# Patient Record
Sex: FEMALE | Race: BLACK OR AFRICAN AMERICAN | Employment: FULL TIME | URBAN - METROPOLITAN AREA
[De-identification: names, ages, dates, MRNs, and addresses within clinical notes are randomized per-mention and may not be internally consistent; named-entity substitution may affect disease eponyms.]

---

## 2017-04-07 ENCOUNTER — OFFICE VISIT (OUTPATIENT)
Dept: INTERNAL MEDICINE CLINIC | Age: 22
End: 2017-04-07

## 2017-04-07 VITALS
DIASTOLIC BLOOD PRESSURE: 82 MMHG | WEIGHT: 170 LBS | HEART RATE: 60 BPM | BODY MASS INDEX: 27.32 KG/M2 | TEMPERATURE: 97.4 F | HEIGHT: 66 IN | SYSTOLIC BLOOD PRESSURE: 116 MMHG | OXYGEN SATURATION: 96 % | RESPIRATION RATE: 14 BRPM

## 2017-04-07 DIAGNOSIS — J34.89 NASAL DRYNESS: Primary | ICD-10-CM

## 2017-04-07 NOTE — PROGRESS NOTES
Subjective:      Junaid Mckeon is a 20 y.o. female who presents today with irritation in her nose. Feels like there is something in her left nare. Right nare also uncomfortable. 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 30 Tab 3    OTHER For hair growth - has Biotin and Vit C + some other things      norethindrone ac-eth estradiol (LOESTRIN 1.5/30, 21,) 1.5-30 mg-mcg Tab Take as directed. 1 Package 6        Review of Systems    Pertinent items are noted in HPI. Objective:     Visit Vitals    /82 (BP 1 Location: Left arm, BP Patient Position: Sitting)    Pulse 60    Temp 97.4 °F (36.3 °C) (Oral)    Resp 14    Ht 5' 6.25\" (1.683 m)    Wt 170 lb (77.1 kg)    LMP 03/31/2017 (Exact Date)    SpO2 96%    BMI 27.23 kg/m2     General appearance: alert, cooperative, no distress, appears stated age  Head: Normocephalic, without obvious abnormality, atraumatic  Nose: Nares normal. Septum midline. Mucosa dry. No drainage or sinus tenderness. Assessment/Plan:     1. Nasal dryness  -recommended use of Ayr nasal spray with glycerine- use as needed throughout the day. Also recommended applying ayr nasal gel to also help maintain moisture      Follow-up Disposition:       Return if symptoms worsen or fail to improve. Advised patient to call back or return to office if symptoms worsen/change/persist.     Discussed expected course/resolution/complications of diagnosis in detail with patient. Medication risks/benefits/costs/interactions/alternatives discussed with patient. Patient was given an after visit summary which includes diagnoses, current medications, & vitals.    Patient expressed understanding with the diagnosis and plan.

## 2017-04-07 NOTE — MR AVS SNAPSHOT
Visit Information Date & Time Provider Department Dept. Phone Encounter #  
 4/7/2017 11:00 AM Sammy Andino MD [place] [address] [facility] [phone] [id number] Follow-up Instructions Return if symptoms worsen or fail to improve. Upcoming Health Maintenance Date Due  
 HPV AGE 9Y-34Y (1 of 3 - Female 3 Dose Series) 2/23/2005 Pneumococcal 19-64 Medium Risk (1 of 1 - PPSV23) 2/23/2013 DTaP/Tdap/Td series (1 - Tdap) 2/23/2015 INFLUENZA AGE 9 TO ADULT 8/1/2016 PAP AKA CERVICAL CYTOLOGY 11/15/2018 Allergies as of 4/7/2017  Review Complete On: 4/7/2017 By: Sammy Andino MD  
 No Known Allergies Current Immunizations  Never Reviewed No immunizations on file. Not reviewed this visit Vitals BP Pulse Temp Resp Height(growth percentile) Weight(growth percentile) 116/82 (BP 1 Location: Left arm, BP Patient Position: Sitting) 60 97.4 °F (36.3 °C) (Oral) 14 5' 6.25\" (1.683 m) 170 lb (77.1 kg) LMP SpO2 BMI OB Status Smoking Status 03/31/2017 (Exact Date) 96% 27.23 kg/m2 Having regular periods Never Smoker Vitals History BMI and BSA Data Body Mass Index Body Surface Area  
 27.23 kg/m 2 1.9 m 2 Preferred Pharmacy Pharmacy Name Phone [facility] [phone] Your Updated Medication List  
  
   
This list is accurate as of: 4/7/17 11:28 AM.  Always use your most recent med list.  
  
  
  
  
 LORazepam 0.5 mg tablet Commonly known as:  ATIVAN  
1/2 to 1 whole tab daily as needed. norethindrone ac-eth estradiol 1.5-30 mg-mcg Tab Commonly known as:  Navya Rouleau 1.5/30 (21) Take as directed. OTHER For hair growth - has Biotin and Vit C + some other things Follow-up Instructions Return if symptoms worsen or fail to improve. Introducing [facility]! Dear Aaron Europe: Thank you for requesting a Kaybus account. Our records indicate that you already have an active Kaybus account. You can access your account anytime at [email or web address] Did you know that you can access your hospital and ER discharge instructions at any time in Kaybus? You can also review all of your test results from your hospital stay or ER visit. Additional Information If you have questions, please visit the Frequently Asked Questions section of the Kaybus website at [email or web address] Remember, Kaybus is NOT to be used for urgent needs. For medical emergencies, dial 911. Now available from your iPhone and Android! Please provide this summary of care documentation to your next provider. Your primary care clinician is listed as Jasmin Ge. If you have any questions after today's visit, please call [phone].

## 2017-04-07 NOTE — PROGRESS NOTES
Chief Complaint   Patient presents with    Nasal Pain     Pt c/o \"polyp\" in right nasal passage and states that her nostril seem to be closing. She also notes a sore in her left nostril.

## 2017-12-06 RX ORDER — PROPRANOLOL HYDROCHLORIDE 20 MG/1
20 TABLET ORAL
Qty: 30 TAB | Refills: 3 | Status: SHIPPED | OUTPATIENT
Start: 2017-12-06 | End: 2018-04-20 | Stop reason: ALTCHOICE

## 2017-12-06 NOTE — TELEPHONE ENCOUNTER
Patient has trembling hands when nervous or anxious. 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     Plan: trial of use of propranolol 20mg daily on days in which she anticipates procedures that may cause her tremors./anxiety

## 2018-01-31 ENCOUNTER — OFFICE VISIT (OUTPATIENT)
Dept: INTERNAL MEDICINE CLINIC | Age: 23
End: 2018-01-31

## 2018-01-31 VITALS
WEIGHT: 176 LBS | TEMPERATURE: 98.1 F | SYSTOLIC BLOOD PRESSURE: 114 MMHG | OXYGEN SATURATION: 99 % | BODY MASS INDEX: 28.28 KG/M2 | HEIGHT: 66 IN | DIASTOLIC BLOOD PRESSURE: 70 MMHG | HEART RATE: 76 BPM

## 2018-01-31 DIAGNOSIS — H61.23 EXCESSIVE CERUMEN IN BOTH EAR CANALS: Primary | ICD-10-CM

## 2018-01-31 NOTE — MR AVS SNAPSHOT
[address], [address] [place] [address] 
[phone] Patient: Casey Spencer MRN: [id number] :1995 Visit Information Date & Time Provider Department Dept. Phone Encounter #  
 2018  9:20 AM VENANCIO Otto  [facility] [phone] [id number] Upcoming Health Maintenance Date Due  
 HPV AGE 9Y-34Y (1 of 3 - Female 3 Dose Series) 2005 Pneumococcal 19-64 Medium Risk (1 of 1 - PPSV23) 2013 DTaP/Tdap/Td series (1 - Tdap) 2015 Influenza Age 5 to Adult 2017 PAP AKA CERVICAL CYTOLOGY 11/15/2018 Allergies as of 2018  Review Complete On: 2018 By: Robbie Bush NP No Known Allergies Current Immunizations  Never Reviewed Name Date Influenza Vaccine (Quad) PF 2017 Not reviewed this visit You Were Diagnosed With   
  
 Codes Comments Excessive cerumen in both ear canals    -  Primary ICD-10-CM: J15.79 
ICD-9-CM: 380.4 Vitals BP Pulse Temp Height(growth percentile) Weight(growth percentile) LMP  
 114/70 (BP 1 Location: Left arm, BP Patient Position: Sitting) 76 98.1 °F (36.7 °C) (Oral) 5' 6.25\" (1.683 m) 176 lb (79.8 kg) 01/10/2018 SpO2 BMI OB Status Smoking Status 99% 28.19 kg/m2 Having regular periods Never Smoker Vitals History BMI and BSA Data Body Mass Index Body Surface Area  
 28.19 kg/m 2 1.93 m 2 Preferred Pharmacy Pharmacy Name Phone [facility] [phone] Your Updated Medication List  
  
   
This list is accurate as of: 18  9:45 AM.  Always use your most recent med list.  
  
  
  
  
 LORazepam 0.5 mg tablet Commonly known as:  ATIVAN  
1/2 to 1 whole tab daily as needed. norethindrone ac-eth estradiol 1.5-30 mg-mcg Tab Commonly known as:  Mario Bur 1.530 (21) Take as directed. propranolol 20 mg tablet Commonly known as:  INDERAL Take 1 Tab by mouth daily as needed. We Performed the Following REMOVE IMPACTED EAR WAX [55266 CPT(R)] Introducing [facility]! Dear Lui Mendoza: Thank you for requesting a VirtueBuild account. Our records indicate that you already have an active VirtueBuild account. You can access your account anytime at [email or web address] Did you know that you can access your hospital and ER discharge instructions at any time in VirtueBuild? You can also review all of your test results from your hospital stay or ER visit. Additional Information If you have questions, please visit the Frequently Asked Questions section of the VirtueBuild website at [email or web address] Remember, VirtueBuild is NOT to be used for urgent needs. For medical emergencies, dial 911. Now available from your iPhone and Android! Please provide this summary of care documentation to your next provider. Your primary care clinician is listed as Jasmin Ge. If you have any questions after today's visit, please call [phone].

## 2018-01-31 NOTE — PROGRESS NOTES
19 yo female here for ear cleaning. Was told by NP at school that she has excess wax in L ear. She self cleans with a kit from pharmacy about every 6 mos, but most recently not successfully. PE: WNWD BF   BP - 114/70   Ears - moderate cerumen in each ext canal    Imp: Excess Cerumen Bilat    Plan: Successful removal cerumen both ears   Asked her to bring in immunization records for update of chart   She plans to see Dr. Karyn Dawson for physical in upcoming year  ____________________________  Expected course of current diagnosed problem(s) as well as expected progression and possible complications, and desired follow up with provider are discussed with patient. Patient is encouraged to be back in touch with any questions or concerns. Patient expresses understanding of plan of care. Patient is given AVS which includes diagnoses, current medications, vitals.

## 2018-01-31 NOTE — PROGRESS NOTES
Reviewed record in preparation for visit and have obtained necessary documentation. Identified pt with two pt identifiers(name and ). Health Maintenance Due   Topic    HPV AGE 9Y-34Y (1 of 3 - Female 3 Dose Series)    Pneumococcal 19-64 Medium Risk (1 of 1 - PPSV23)    DTaP/Tdap/Td series (1 - Tdap)    Influenza Age 5 to Adult          Chief Complaint   Patient presents with    Wax in Ear     pt states that she needs an ear cleaning. NP at school told her that her L ear is occulded. Wt Readings from Last 3 Encounters:   18 176 lb (79.8 kg)   17 170 lb (77.1 kg)   16 185 lb (83.9 kg)     Temp Readings from Last 3 Encounters:   18 98.1 °F (36.7 °C) (Oral)   17 97.4 °F (36.3 °C) (Oral)   16 97.8 °F (36.6 °C) (Oral)     BP Readings from Last 3 Encounters:   18 114/70   17 116/82   16 124/60     Pulse Readings from Last 3 Encounters:   18 76   17 60   16 64           Learning Assessment:  :     Learning Assessment 2015   PRIMARY LEARNER Patient   HIGHEST LEVEL OF EDUCATION - PRIMARY LEARNER  2 YEARS OF COLLEGE   BARRIERS PRIMARY LEARNER NONE   CO-LEARNER CAREGIVER No   PRIMARY LANGUAGE ENGLISH   LEARNER PREFERENCE PRIMARY DEMONSTRATION   ANSWERED BY Patient   RELATIONSHIP SELF       Depression Screening:  :     PHQ over the last two weeks 2018   Little interest or pleasure in doing things Not at all   Feeling down, depressed or hopeless Not at all   Total Score PHQ 2 0       Fall Risk Assessment:  :     No flowsheet data found. Abuse Screening:  :     Abuse Screening Questionnaire 2018   Do you ever feel afraid of your partner? N   Are you in a relationship with someone who physically or mentally threatens you? N   Is it safe for you to go home?  Y       Coordination of Care Questionnaire:  :     1) Have you been to an emergency room, urgent care clinic since your last visit? no   Hospitalized since your last visit? no             2) Have you seen or consulted any other health care providers outside of [address] [place] [address] since your last visit? no  (Include any pap smears or colon screenings in this section.)    3) Do you have an Advance Directive on file? no    4) Are you interested in receiving information on Advance Directives?  NO

## 2018-04-20 ENCOUNTER — OFFICE VISIT (OUTPATIENT)
Dept: INTERNAL MEDICINE CLINIC | Age: 23
End: 2018-04-20

## 2018-04-20 VITALS
SYSTOLIC BLOOD PRESSURE: 116 MMHG | HEART RATE: 79 BPM | TEMPERATURE: 98.5 F | RESPIRATION RATE: 14 BRPM | OXYGEN SATURATION: 98 % | DIASTOLIC BLOOD PRESSURE: 68 MMHG | HEIGHT: 66 IN | BODY MASS INDEX: 27.8 KG/M2 | WEIGHT: 173 LBS

## 2018-04-20 DIAGNOSIS — Z00.00 ANNUAL PHYSICAL EXAM: Primary | ICD-10-CM

## 2018-04-20 DIAGNOSIS — Z12.4 PAP SMEAR FOR CERVICAL CANCER SCREENING: Chronic | ICD-10-CM

## 2018-04-20 RX ORDER — BISMUTH SUBSALICYLATE 262 MG
1 TABLET,CHEWABLE ORAL DAILY
COMMUNITY
End: 2019-04-17

## 2018-04-20 NOTE — MR AVS SNAPSHOT
[address] [place] [address], [address] [place] [address] 
[phone] Patient: Mike Ricketts MRN: [id number] :1995 Visit Information Date & Time Provider Department Dept. Phone Encounter #  
 2018 10:00 AM [place] [address] [facility] [phone] [id number] Follow-up Instructions Return in about 1 year (around 2019) for annual exam. Upcoming Health Maintenance Date Due  
 HPV Age 9Y-34Y (3 of 1 - Female 3 Dose Series) 2018* Pneumococcal 19-64 Medium Risk (1 of 1 - PPSV23) 2019* PAP AKA CERVICAL CYTOLOGY 11/15/2018 DTaP/Tdap/Td series (2 - Td) 2024 *Topic was postponed. The date shown is not the original due date. Allergies as of 2018  Review Complete On: 2018 By: Joni Combs MD  
 No Known Allergies Current Immunizations  Reviewed on 2018 Name Date DTP 2005, 1998, 1994, 1994, 1995 HPV (9-valent) 2008, 2008 Hep B Vaccine 1994, 1994, 1994 Hib 1995, 1994, 1994, 1994 Influenza Vaccine (Quad) PF 2017 MMR 1999, 1995 Meningococcal (MCV4) Vaccine 2012, 2009 Poliovirus vaccine 1998, 1995, 1994, 1994 Tdap 2014 Reviewed by Joni Combs MD on 2018 at  9:04 AM  
 Reviewed by Joni Combs MD on 2018 at  9:13 AM  
 Reviewed by Joni Combs MD on 2018 at 10:47 AM  
You Were Diagnosed With   
  
 Codes Comments Annual physical exam    -  Primary ICD-10-CM: Z00.00 ICD-9-CM: V70.0 Vitals BP Pulse Temp Resp Height(growth percentile) Weight(growth percentile) 116/68 (BP 1 Location: Left arm, BP Patient Position: Sitting) 79 98.5 °F (36.9 °C) (Oral) 14 5' 6.25\" (1.683 m) 173 lb (78.5 kg) LMP SpO2 BMI OB Status Smoking Status 2018 (Exact Date) 98% 27.71 kg/m2 Having regular periods Never Smoker Vitals History BMI and BSA Data Body Mass Index Body Surface Area  
 27.71 kg/m 2 1.92 m 2 Preferred Pharmacy Pharmacy Name Phone [facility] [phone] Your Updated Medication List  
  
   
This list is accurate as of 4/20/18 10:57 AM.  Always use your most recent med list.  
  
  
  
  
 LORazepam 0.5 mg tablet Commonly known as:  ATIVAN  
1/2 to 1 whole tab daily as needed. multivitamin tablet Commonly known as:  ONE A DAY Take 1 Tab by mouth daily. norethindrone ac-eth estradiol 1.5-30 mg-mcg Tab Commonly known as:  Selestine Salter 1.5/30 (21) Take as directed. We Performed the Following CBC WITH AUTOMATED DIFF [87084 CPT(R)] LIPID PANEL [70243 CPT(R)] METABOLIC PANEL, COMPREHENSIVE [40298 CPT(R)] URINALYSIS W/ RFLX MICROSCOPIC [66185 CPT(R)] Follow-up Instructions Return in about 1 year (around 4/20/2019) for annual exam.  
  
  
Introducing [address]! Dear Osmin Churchill: Thank you for requesting a Sunbay account. Our records indicate that you already have an active Sunbay account. You can access your account anytime at [email or web address] Did you know that you can access your hospital and ER discharge instructions at any time in Sunbay? You can also review all of your test results from your hospital stay or ER visit. Additional Information If you have questions, please visit the Frequently Asked Questions section of the Sunbay website at [email or web address] Remember, Sunbay is NOT to be used for urgent needs. For medical emergencies, dial 911. Now available from your iPhone and Android! Please provide this summary of care documentation to your next provider. Your primary care clinician is listed as Jasmin Ge. If you have any questions after today's visit, please call [phone].

## 2018-04-20 NOTE — PROGRESS NOTES
Patient's identity verified with two patient identifiers (name and date of birth). 1. Have you been to the ER, urgent care clinic since your last visit? Hospitalized since your last visit? No  2. Have you seen or consulted any other health care providers outside of the [facility] since your last visit? Include any pap smears or colon screening. No    Chief Complaint   Patient presents with    Complete Physical     Fasting. Fasting. Health Maintenance Due   Topic Date Due    HPV Age 9Y-34Y (1 of 3 - Female 3 Dose Series)  Has not had, unsure of status, will check with peds office. 11/28/2008     Reviewed record in preparation for visit and have obtained necessary documentation.     Wt Readings from Last 3 Encounters:   04/20/18 173 lb (78.5 kg)   01/31/18 176 lb (79.8 kg)   04/07/17 170 lb (77.1 kg)     Temp Readings from Last 3 Encounters:   04/20/18 98.5 °F (36.9 °C) (Oral)   01/31/18 98.1 °F (36.7 °C) (Oral)   04/07/17 97.4 °F (36.3 °C) (Oral)     BP Readings from Last 3 Encounters:   04/20/18 116/68   01/31/18 114/70   04/07/17 116/82     Pulse Readings from Last 3 Encounters:   04/20/18 79   01/31/18 76   04/07/17 60       Learning Assessment:  :     Learning Assessment 4/20/2018 1/6/2015   PRIMARY LEARNER Patient Patient   HIGHEST LEVEL OF EDUCATION - PRIMARY LEARNER  4 YEARS OF COLLEGE 2 YEARS OF COLLEGE   BARRIERS PRIMARY LEARNER NONE NONE   CO-LEARNER CAREGIVER No No   PRIMARY LANGUAGE ENGLISH ENGLISH   LEARNER PREFERENCE PRIMARY VIDEOS DEMONSTRATION     READING -   ANSWERED BY patient Patient   RELATIONSHIP SELF SELF

## 2018-04-20 NOTE — PROGRESS NOTES
Subjective:      Mima Brown is a 24 y.o. female who presents today for her annual exam.     Gyn exam - Devin Wolff NP, [facility] - 11/2017    No new concerns. 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 She denies any chest pain, shortness of breath, syncope, headaches, nausea/vomiting, or any bowel changes. 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  LORazepam (ATIVAN) 0.5 mg tablet 1/2 to 1 whole tab daily as needed. 30 Tab 3    norethindrone ac-eth estradiol (LOESTRIN 1.5/30, 21,) 1.5-30 mg-mcg Tab Take as directed. 1 Package 6        Review of Systems    A comprehensive review of systems was negative except for that written in the HPI. Objective:     Visit Vitals    /68 (BP 1 Location: Left arm, BP Patient Position: Sitting)    Pulse 79    Temp 98.5 °F (36.9 °C) (Oral)    Resp 14    Ht 5' 6.25\" (1.683 m)    Wt 173 lb (78.5 kg)    LMP 04/08/2018 (Exact Date)    SpO2 98%    BMI 27.71 kg/m2     General:  Alert, cooperative, no distress, appears stated age. Head:  Normocephalic, without obvious abnormality, atraumatic. Eyes:  Conjunctivae/corneas clear. PERRL, EOMs intact. Ears:  Normal TMs and external ear canals both ears. Nose: Nares normal. Septum midline. Mucosa normal. No drainage or sinus tenderness. Throat: Lips, mucosa, and tongue normal. Teeth and gums normal.   Neck: Supple, symmetrical, trachea midline, no adenopathy, thyroid: no enlargement/tenderness/nodules, no carotid bruit and no JVD. Back:   Symmetric, no curvature. ROM normal. No CVA tenderness. Lungs:   Clear to auscultation bilaterally.    Chest wall:  No tenderness or deformity. Heart:  Regular rate and rhythm, S1, S2 normal, no murmur, click, rub or gallop. Breast Exam:  No tenderness, masses, or nipple abnormality. Abdomen:   Soft, non-tender. Bowel sounds normal. No masses,  No organomegaly. Extremities: Extremities normal, atraumatic, no cyanosis or edema. Pulses: 2+ and symmetric all extremities. Skin: Skin color, texture, turgor normal. No rashes or lesions. Lymph nodes: Cervical, supraclavicular, and axillary nodes normal.   Neurologic: CNII-XII intact. Normal strength, sensation and reflexes throughout. Assessment/Plan:     1. Annual physical exam  -up to date with her immunizations.   -up to date with her gyn exam.     - CBC WITH AUTOMATED DIFF  - METABOLIC PANEL, COMPREHENSIVE  - LIPID PANEL  - URINALYSIS W/ RFLX MICROSCOPIC     Follow-up Disposition:     Follow up yearly     Return if symptoms worsen or fail to improve. Advised patient to call back or return to office if symptoms worsen/change/persist.     Discussed expected course/resolution/complications of diagnosis in detail with patient. Medication risks/benefits/costs/interactions/alternatives discussed with patient. Patient was given an after visit summary which includes diagnoses, current medications, & vitals. Patient expressed understanding with the diagnosis and plan.

## 2018-04-21 LAB
ALBUMIN SERPL-MCNC: 4.3 G/DL (ref 3.5–5.5)
ALBUMIN/GLOB SERPL: 1.7 {RATIO} (ref 1.2–2.2)
ALP SERPL-CCNC: 44 IU/L (ref 39–117)
ALT SERPL-CCNC: 32 IU/L (ref 0–32)
APPEARANCE UR: CLEAR
AST SERPL-CCNC: 26 IU/L (ref 0–40)
BACTERIA #/AREA URNS HPF: ABNORMAL /[HPF]
BASOPHILS # BLD AUTO: 0 X10E3/UL (ref 0–0.2)
BASOPHILS NFR BLD AUTO: 0 %
BILIRUB SERPL-MCNC: 0.3 MG/DL (ref 0–1.2)
BILIRUB UR QL STRIP: NEGATIVE
BUN SERPL-MCNC: 14 MG/DL (ref 6–20)
BUN/CREAT SERPL: 21 (ref 9–23)
CALCIUM SERPL-MCNC: 9.3 MG/DL (ref 8.7–10.2)
CASTS URNS QL MICRO: ABNORMAL /LPF
CHLORIDE SERPL-SCNC: 103 MMOL/L (ref 96–106)
CHOLEST SERPL-MCNC: 109 MG/DL (ref 100–199)
CO2 SERPL-SCNC: 23 MMOL/L (ref 18–29)
COLOR UR: YELLOW
CREAT SERPL-MCNC: 0.68 MG/DL (ref 0.57–1)
EOSINOPHIL # BLD AUTO: 0.1 X10E3/UL (ref 0–0.4)
EOSINOPHIL NFR BLD AUTO: 2 %
EPI CELLS #/AREA URNS HPF: ABNORMAL /HPF
ERYTHROCYTE [DISTWIDTH] IN BLOOD BY AUTOMATED COUNT: 13.6 % (ref 12.3–15.4)
GFR SERPLBLD CREATININE-BSD FMLA CKD-EPI: 123 ML/MIN/1.73
GFR SERPLBLD CREATININE-BSD FMLA CKD-EPI: 142 ML/MIN/1.73
GLOBULIN SER CALC-MCNC: 2.5 G/DL (ref 1.5–4.5)
GLUCOSE SERPL-MCNC: 89 MG/DL (ref 65–99)
GLUCOSE UR QL: NEGATIVE
HCT VFR BLD AUTO: 40.9 % (ref 34–46.6)
HDLC SERPL-MCNC: 49 MG/DL
HGB BLD-MCNC: 13.2 G/DL (ref 11.1–15.9)
HGB UR QL STRIP: ABNORMAL
IMM GRANULOCYTES # BLD: 0 X10E3/UL (ref 0–0.1)
IMM GRANULOCYTES NFR BLD: 0 %
INTERPRETATION, 910389: NORMAL
KETONES UR QL STRIP: NEGATIVE
LDLC SERPL CALC-MCNC: 40 MG/DL (ref 0–99)
LEUKOCYTE ESTERASE UR QL STRIP: NEGATIVE
LYMPHOCYTES # BLD AUTO: 2.7 X10E3/UL (ref 0.7–3.1)
LYMPHOCYTES NFR BLD AUTO: 45 %
MCH RBC QN AUTO: 27.2 PG (ref 26.6–33)
MCHC RBC AUTO-ENTMCNC: 32.3 G/DL (ref 31.5–35.7)
MCV RBC AUTO: 84 FL (ref 79–97)
MICRO URNS: ABNORMAL
MONOCYTES # BLD AUTO: 0.4 X10E3/UL (ref 0.1–0.9)
MONOCYTES NFR BLD AUTO: 8 %
MUCOUS THREADS URNS QL MICRO: PRESENT
NEUTROPHILS # BLD AUTO: 2.6 X10E3/UL (ref 1.4–7)
NEUTROPHILS NFR BLD AUTO: 45 %
NITRITE UR QL STRIP: NEGATIVE
PH UR STRIP: 5.5 [PH] (ref 5–7.5)
PLATELET # BLD AUTO: 308 X10E3/UL (ref 150–379)
POTASSIUM SERPL-SCNC: 3.8 MMOL/L (ref 3.5–5.2)
PROT SERPL-MCNC: 6.8 G/DL (ref 6–8.5)
PROT UR QL STRIP: ABNORMAL
RBC # BLD AUTO: 4.86 X10E6/UL (ref 3.77–5.28)
RBC #/AREA URNS HPF: ABNORMAL /HPF
SODIUM SERPL-SCNC: 140 MMOL/L (ref 134–144)
SP GR UR: >=1.03 (ref 1–1.03)
TRIGL SERPL-MCNC: 98 MG/DL (ref 0–149)
UROBILINOGEN UR STRIP-MCNC: 0.2 MG/DL (ref 0.2–1)
VLDLC SERPL CALC-MCNC: 20 MG/DL (ref 5–40)
WBC # BLD AUTO: 5.9 X10E3/UL (ref 3.4–10.8)
WBC #/AREA URNS HPF: ABNORMAL /HPF

## 2018-04-23 DIAGNOSIS — R31.29 MICROSCOPIC HEMATURIA: Primary | ICD-10-CM

## 2018-05-19 LAB
APPEARANCE UR: CLEAR
BACTERIA #/AREA URNS HPF: ABNORMAL /[HPF]
BILIRUB UR QL STRIP: NEGATIVE
CASTS URNS QL MICRO: ABNORMAL /LPF
COLOR UR: YELLOW
EPI CELLS #/AREA URNS HPF: >10 /HPF
GLUCOSE UR QL: NEGATIVE
HGB UR QL STRIP: ABNORMAL
KETONES UR QL STRIP: NEGATIVE
LEUKOCYTE ESTERASE UR QL STRIP: NEGATIVE
MICRO URNS: ABNORMAL
MUCOUS THREADS URNS QL MICRO: PRESENT
NITRITE UR QL STRIP: NEGATIVE
PH UR STRIP: 5.5 [PH] (ref 5–7.5)
PROT UR QL STRIP: NEGATIVE
RBC #/AREA URNS HPF: ABNORMAL /HPF
SP GR UR: 1.03 (ref 1–1.03)
UROBILINOGEN UR STRIP-MCNC: 0.2 MG/DL (ref 0.2–1)
WBC #/AREA URNS HPF: ABNORMAL /HPF

## 2018-08-20 ENCOUNTER — OFFICE VISIT (OUTPATIENT)
Dept: INTERNAL MEDICINE CLINIC | Age: 23
End: 2018-08-20

## 2018-08-20 VITALS
DIASTOLIC BLOOD PRESSURE: 84 MMHG | HEIGHT: 66 IN | HEART RATE: 66 BPM | TEMPERATURE: 96.6 F | OXYGEN SATURATION: 98 % | RESPIRATION RATE: 16 BRPM | SYSTOLIC BLOOD PRESSURE: 122 MMHG | WEIGHT: 182 LBS | BODY MASS INDEX: 29.25 KG/M2

## 2018-08-20 DIAGNOSIS — R21 RASH AND NONSPECIFIC SKIN ERUPTION: Primary | ICD-10-CM

## 2018-08-20 RX ORDER — TRIAMCINOLONE ACETONIDE 1 MG/G
OINTMENT TOPICAL
Qty: 30 G | Refills: 0 | Status: SHIPPED | OUTPATIENT
Start: 2018-08-20 | End: 2018-11-30 | Stop reason: ALTCHOICE

## 2018-08-20 NOTE — PROGRESS NOTES
Chief Complaint   Patient presents with    Rash     Pt c/o R shoulder rash that started about 2 weeks ago. States that she has tried Rx steroids with no improvements.

## 2018-08-20 NOTE — PROGRESS NOTES
23 yo female with rash on R shoulder which has not responded to OTC steroids and cocobutter for the skin discoloration; 3 days ago, she started using BID a topical prescription her mother gave her (Fluticasone propionate 0.05%), but no improvement. This started 2 weeks ago on top of both shoulders, w/ R always worse than L. It is pruritic, and nowhere else on the body. She has a dog, but he doesn't get up around her shoulders or face. She switched body wash over a month ago from brand-name unscented [employer] to Marvin brand of the same. She does not swim. She does not do gardening. She is an in-hospital med-surg NP. PE: WNWD BF   T - 96.6   Repeat BP - 122/84   L Shoulder - circumscribed hyperpigmented area 3 cm which is faded compared to R   R Shoulder - 2 patches which are more elongated - on proximal to neck is 5.5 cm and hyperpigmented, the other 8 cm and more inflammed in appearance    Imp: Skin rash - ? Fungal - Tinea corporis    Plan: Culture sent for fungus   Add Lamisil BID for 1 week to topical steroid   Triamcinolone topical BID  ___________________________  Expected course of current diagnosed problem(s) as well as expected progression and possible complications, and desired follow up with provider are discussed with patient. Patient is encouraged to be back in touch with any questions or concerns. Patient expresses understanding of plan of care. Patient is given AVS which includes diagnoses, current medications, vitals.

## 2018-08-20 NOTE — PATIENT INSTRUCTIONS
Lamisil topical to shoulder rash areas twice a day for ONE WEEK, then stop and use only the steroid as needed  ______________________________________________     Ringworm: Care Instructions  Your Care Instructions  Ringworm is a fungus infection of the skin. It is not caused by a worm. Ringworm causes a round, scaly rash that may crack and itch. The rash can spread over a wide area. One type of fungus that causes ringworm is often found in locker rooms and swimming pools. It grows well in warm, moist areas of the skin, such as in skin folds. You can get ringworm by sharing towels, clothing, and sports equipment. You can also get it by touching someone who has ringworm. Ringworm is treated with cream that kills the fungus. If the rash is widespread, you may need pills to get rid of it. Ringworm often comes back after treatment. If the rash becomes infected with bacteria, you may need antibiotics. Follow-up care is a key part of your treatment and safety. Be sure to make and go to all appointments, and call your doctor if you are having problems. It's also a good idea to know your test results and keep a list of the medicines you take. How can you care for yourself at home? · Take your medicines exactly as prescribed. Call your doctor if you have any problems with your medicine. · Wash the rash with soap and water, remove flaky skin, and dry thoroughly. · Try an over-the-counter cream with clotrimazole or miconazole in it. Brand names include Lotrimin, Micatin, and Tinactin. Terbinafine cream (Lamisil) is also available without a prescription. Spread the cream beyond the edge or border of the rash. Follow the directions on the package. Do not stop using the medicine just because your skin clears up. You will probably need to continue treatment for 2 to 4 weeks. · To keep from getting another infection:  ¨ Do not go barefoot in public places such as gyms or locker rooms. Avoid sharing towels and clothes.  Use flip-flops or some other type of shoe in the shower. ¨ Do not wear tight clothes or let your skin stay damp for long periods, such as by staying in a wet bathing suit or sweaty clothes. When should you call for help? Call your doctor now or seek immediate medical care if:    · You have signs of infection such as:  ¨ Pain, warmth, or swelling in your skin. ¨ Red streaks near a wound in the skin. ¨ Pus coming from the rash on your skin. ¨ A fever.    Watch closely for changes in your health, and be sure to contact your doctor if:    · Your ringworm does not improve after 2 weeks of treatment.     · You do not get better as expected. Where can you learn more? Go to http://kip-candelario.info/. Enter F293 in the search box to learn more about \"Ringworm: Care Instructions. \"  Current as of: October 5, 2017  Content Version: 11.7  © 0145-2888 [employer]. Care instructions adapted under license by [employer] (which disclaims liability or warranty for this information). If you have questions about a medical condition or this instruction, always ask your healthcare professional. [address] any warranty or liability for your use of this information.

## 2018-08-30 ENCOUNTER — DOCUMENTATION ONLY (OUTPATIENT)
Dept: INTERNAL MEDICINE CLINIC | Age: 23
End: 2018-08-30

## 2018-09-10 ENCOUNTER — PATIENT MESSAGE (OUTPATIENT)
Dept: INTERNAL MEDICINE CLINIC | Age: 23
End: 2018-09-10

## 2018-09-10 NOTE — TELEPHONE ENCOUNTER
From: Murali Cortes  To: Nando Packer NP  Sent: 9/10/2018 10:51 AM EDT  Subject: Test Results Question    Good morning,    Did the results for my shoulder culture ever come back?     [facility]

## 2018-09-10 NOTE — TELEPHONE ENCOUNTER
Checked [employer], result is preliminary. Call to [facility], states \"expected result date is 9/17/18\", no further information provided. Asked tech for name, but she hung up prior to responding. Patient informed of this information.

## 2018-09-20 LAB — FUNGUS SPEC CULT: NORMAL

## 2018-10-30 ENCOUNTER — OFFICE VISIT (OUTPATIENT)
Dept: INTERNAL MEDICINE CLINIC | Age: 23
End: 2018-10-30

## 2018-10-30 VITALS
RESPIRATION RATE: 16 BRPM | TEMPERATURE: 98.5 F | BODY MASS INDEX: 29.77 KG/M2 | HEIGHT: 66 IN | OXYGEN SATURATION: 99 % | HEART RATE: 79 BPM | SYSTOLIC BLOOD PRESSURE: 118 MMHG | DIASTOLIC BLOOD PRESSURE: 64 MMHG | WEIGHT: 185.2 LBS

## 2018-10-30 DIAGNOSIS — M70.62 TROCHANTERIC BURSITIS OF LEFT HIP: Primary | ICD-10-CM

## 2018-10-30 RX ORDER — METHYLPREDNISOLONE 4 MG/1
TABLET ORAL
Qty: 1 DOSE PACK | Refills: 0 | Status: SHIPPED | OUTPATIENT
Start: 2018-10-30 | End: 2018-11-30 | Stop reason: ALTCHOICE

## 2018-10-30 NOTE — PROGRESS NOTES
Theola Kehr is a 24 y.o. female     Chief Complaint   Patient presents with    Back Pain     Patient stated she has had the pain for the past several months the only thing that helps the pain is not putting weight on the left side. Visit Vitals  /64 (BP 1 Location: Left arm, BP Patient Position: Sitting)   Pulse 79   Temp 98.5 °F (36.9 °C) (Oral)   Resp 16   Ht 5' 6.25\" (1.683 m)   Wt 185 lb 3.2 oz (84 kg)   SpO2 99%   BMI 29.67 kg/m²       Health Maintenance Due   Topic Date Due    MEDICARE YEARLY EXAM  10/29/2018       1. Have you been to the ER, urgent care clinic since your last visit? Hospitalized since your last visit? No    2. Have you seen or consulted any other health care providers outside of the [address] [place] [address] since your last visit? Include any pap smears or colon screening.  No

## 2018-10-30 NOTE — PATIENT INSTRUCTIONS
Hip Bursitis: Care Instructions  Your Care Instructions    Bursitis is inflammation of the bursa. A bursa is a small sac of fluid that cushions a joint and helps it move easily. A bursa sits between a bone in the hip and the muscles and tendons in the thigh and buttock. Injury or overuse of the hip can cause bursitis. Activities that can lead to bursitis include twisting and rapid joint movement. Bursitis can cause hip pain. Bursitis usually gets better if you avoid the activity that caused it. If pain lasts or gets worse despite home treatment, your doctor may draw fluid from the bursa through a needle. This may relieve your pain and help your doctor know if you have an infection. If so, your doctor will prescribe antibiotics. If you have inflammation only, you may get a corticosteroid shot to reduce swelling and pain. Sometimes surgery is needed to drain or remove the bursa. Follow-up care is a key part of your treatment and safety. Be sure to make and go to all appointments, and call your doctor if you are having problems. It's also a good idea to know your test results and keep a list of the medicines you take. How can you care for yourself at home? · Put ice or a cold pack on your hip for 10 to 20 minutes at a time. Put a thin cloth between the ice and your skin. · After 3 days of using ice, you may use heat on your hip. You can use a hot water bottle, a heating pad set on low, or a warm, moist towel. · Rest your hip. Stop any activities that cause pain. Switch to activities that do not stress your hip. · Take your medicines exactly as prescribed. Call your doctor if you think you are having a problem with your medicine. · Ask your doctor if you can take an over-the-counter pain medicine, such as acetaminophen (Tylenol), ibuprofen (Advil, Motrin), or naproxen (Aleve). Be safe with medicines. Read and follow all instructions on the label.   · To prevent stiffness, gently move the hip joint as much as you can without pain every day. As the pain gets better, keep doing range-of-motion exercises. Ask your doctor for exercises that will make the muscles around the hip joint stronger. Do these as directed. · You can slowly return to the activity that caused the pain, but do it with less effort until you can do it without pain or swelling. Be sure to warm up before and stretch after you do the activity. When should you call for help? Call your doctor now or seek immediate medical care if:    · You have a fever.     · You have increased swelling or redness in your hip.     · You cannot use your hip, or the pain in your hip gets worse.    Watch closely for changes in your health, and be sure to contact your doctor if:    · You have pain for 2 weeks or longer despite home treatment. Where can you learn more? Go to http://kip-candelario.info/. Enter K172 in the search box to learn more about \"Hip Bursitis: Care Instructions. \"  Current as of: November 29, 2017  Content Version: 11.8  © 9707-2821 [employer]. Care instructions adapted under license by [employer] (which disclaims liability or warranty for this information). If you have questions about a medical condition or this instruction, always ask your healthcare professional. [address] any warranty or liability for your use of this information. _____________________________________     Trochanteric Bursitis: Exercises  Your Care Instructions  Here are some examples of typical rehabilitation exercises for your condition. Start each exercise slowly. Ease off the exercise if you start to have pain. Your doctor or physical therapist will tell you when you can start these exercises and which ones will work best for you. How to do the exercises  Hamstring wall stretch    1. Lie on your back in a doorway, with your good leg through the open door.   2. Slide your affected leg up the wall to straighten your knee. You should feel a gentle stretch down the back of your leg. 1. Do not arch your back. 2. Do not bend either knee. 3. Keep one heel touching the floor and the other heel touching the wall. Do not point your toes. 3. Hold the stretch for at least 1 minute to begin. Then try to lengthen the time you hold the stretch to as long as 6 minutes. 4. Repeat 2 to 4 times. 5. If you do not have a place to do this exercise in a doorway, there is another way to do it:  6. Lie on your back, and bend the knee of your affected leg. 7. Loop a towel under the ball and toes of that foot, and hold the ends of the towel in your hands. 8. Straighten your knee, and slowly pull back on the towel. You should feel a gentle stretch down the back of your leg. 9. Hold the stretch for 15 to 30 seconds. Or even better, hold the stretch for 1 minute if you can. 10. Repeat 2 to 4 times. Straight-leg raises to the outside    1. Lie on your side, with your affected leg on top. 2. Tighten the front thigh muscles of your top leg to keep your knee straight. 3. Keep your hip and your leg straight in line with the rest of your body, and keep your knee pointing forward. Do not drop your hip back. 4. Lift your top leg straight up toward the ceiling, about 12 inches off the floor. Hold for about 6 seconds, then slowly lower your leg. 5. Repeat 8 to 12 times. Clamshell    1. Lie on your side, with your affected leg on top and your head propped on a pillow. Keep your feet and knees together and your knees bent. 2. Raise your top knee, but keep your feet together. Do not let your hips roll back. Your legs should open up like a clamshell. 3. Hold for 6 seconds. 4. Slowly lower your knee back down. Rest for 10 seconds. 5. Repeat 8 to 12 times. Standing quadriceps stretch    1. If you are not steady on your feet, hold on to a chair, counter, or wall.  You can also lie on your stomach or your side to do this exercise. 2. Bend the knee of the leg you want to stretch, and reach behind you to grab the front of your foot or ankle with the hand on the same side. For example, if you are stretching your right leg, use your right hand. 3. Keeping your knees next to each other, pull your foot toward your buttock until you feel a gentle stretch across the front of your hip and down the front of your thigh. Your knee should be pointed directly to the ground, and not out to the side. 4. Hold the stretch for 15 to 30 seconds. 5. Repeat 2 to 4 times. Piriformis stretch    1. Lie on your back with your legs straight. 2. Lift your affected leg and bend your knee. With your opposite hand, reach across your body, and then gently pull your knee toward your opposite shoulder. 3. Hold the stretch for 15 to 30 seconds. 4. Repeat 2 to 4 times. Double knee-to-chest    1. Lie on your back with your knees bent and your feet flat on the floor. You can put a small pillow under your head and neck if it is more comfortable. 2. Bring both knees to your chest.  3. Keep your lower back pressed to the floor. Hold for 15 to 30 seconds. 4. Relax, and lower your knees to the starting position. 5. Repeat 2 to 4 times. Follow-up care is a key part of your treatment and safety. Be sure to make and go to all appointments, and call your doctor if you are having problems. It's also a good idea to know your test results and keep a list of the medicines you take. Where can you learn more? Go to http://kip-candelario.info/. Enter I872 in the search box to learn more about \"Trochanteric Bursitis: Exercises. \"  Current as of: November 29, 2017  Content Version: 11.8  © 8611-9154 [employer]. Care instructions adapted under license by [employer] (which disclaims liability or warranty for this information).  If you have questions about a medical condition or this instruction, always ask your healthcare professional. [address] any warranty or liability for your use of this information.

## 2018-10-30 NOTE — PROGRESS NOTES
23 yo female c/o back pain with worsening when weight-bearing on LLE. Several days ago, she took a 10 mile bike ride w/o problem (flat ground). She has taken 800 mg Ibuprofen BID and used heat w/o improvement. No hx injury to low back or LEs. 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 PE: WNWD BF   LLE - 2+ PT pulse             No distal edema             Tender over trochanteric bursa             SLR increases pain laterally    Imp: L Trochanteric Bursitis    Plan: Stop Advil   Medrol dospak   Ice   Exercise sheet given  ___________________________  Expected course of current diagnosed problem(s) as well as expected progression and possible complications, and desired follow up with provider are discussed with patient. Patient is encouraged to be back in touch with any questions or concerns. Patient expresses understanding of plan of care. Patient is given AVS which includes diagnoses, current medications, vitals.

## 2018-11-30 ENCOUNTER — OFFICE VISIT (OUTPATIENT)
Dept: FAMILY MEDICINE CLINIC | Age: 23
End: 2018-11-30

## 2018-11-30 VITALS
HEART RATE: 109 BPM | TEMPERATURE: 98.3 F | OXYGEN SATURATION: 97 % | HEIGHT: 66 IN | RESPIRATION RATE: 16 BRPM | DIASTOLIC BLOOD PRESSURE: 85 MMHG | BODY MASS INDEX: 29.96 KG/M2 | SYSTOLIC BLOOD PRESSURE: 128 MMHG | WEIGHT: 186.4 LBS

## 2018-11-30 DIAGNOSIS — H65.92 LEFT NON-SUPPURATIVE OTITIS MEDIA: ICD-10-CM

## 2018-11-30 DIAGNOSIS — J01.10 ACUTE NON-RECURRENT FRONTAL SINUSITIS: Primary | ICD-10-CM

## 2018-11-30 DIAGNOSIS — R06.2 WHEEZING: ICD-10-CM

## 2018-11-30 RX ORDER — ERGOCALCIFEROL 1.25 MG/1
CAPSULE ORAL
COMMUNITY
End: 2019-04-17 | Stop reason: ALTCHOICE

## 2018-11-30 RX ORDER — ALBUTEROL SULFATE 90 UG/1
2 AEROSOL, METERED RESPIRATORY (INHALATION)
Qty: 1 INHALER | Refills: 0 | Status: SHIPPED | OUTPATIENT
Start: 2018-11-30 | End: 2019-04-17

## 2018-11-30 RX ORDER — NORETHINDRONE ACETATE AND ETHINYL ESTRADIOL AND FERROUS FUMARATE 1MG-20(24)
KIT ORAL
COMMUNITY
End: 2022-03-14 | Stop reason: SDUPTHER

## 2018-11-30 RX ORDER — PSEUDOEPHEDRINE HCL 30 MG
60 TABLET ORAL 3 TIMES DAILY
Qty: 24 TAB | Refills: 1 | Status: SHIPPED | OUTPATIENT
Start: 2018-11-30 | End: 2018-12-03

## 2018-11-30 RX ORDER — AMOXICILLIN AND CLAVULANATE POTASSIUM 875; 125 MG/1; MG/1
1 TABLET, FILM COATED ORAL EVERY 12 HOURS
Qty: 20 TAB | Refills: 0 | Status: SHIPPED | OUTPATIENT
Start: 2018-11-30 | End: 2018-12-10

## 2018-11-30 NOTE — PROGRESS NOTES
Subjective:   Lerry Litten is a 24 y.o. female who complains of congestion, sore throat, nasal blockage, post nasal drip, dry cough, headache, generalized sinus pain, fever, chills for 5 days, gradually worsening since that time. She denies a history of chest pain and dizziness. She started having wheezing and mild SOB today, she has had a history of asthma 5 or more years ago in [employer] years. Evaluation to date: none. Treatment to date: OTC products. Patient does not smoke cigarettes. Relevant PMH: Asthma. 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    Pap smear for cervical cancer screening Z12.4     Patient Active Problem List    Diagnosis Date Noted    Pap smear for cervical cancer screening 04/20/2018    Limb tremor 11/13/2014    Herpes simplex of female genitalia 05/31/2013    Asthma, exercise induced 10/15/2011     Current Outpatient Medications   Medication Sig Dispense Refill    norethindrone-e.estradiol-iron (MINASTRIN 24 FE) 1 mg-20 mcg(24) /75 mg (4) chew Minastrin 24 Fe 1 mg-20 mcg (24)/75 mg (4) chewable tablet   Chew 1 pill at the same time daily  GRP# [id number]   BIN# [id number] ID# [id number]      PCN#  CN      ergocalciferol (ERGOCALCIFEROL) 50,000 unit capsule Vitamin D2 50,000 unit capsule   Take 1 capsule every week by oral route.  pseudoephedrine (SUDAFED) 30 mg tablet Take 2 Tabs by mouth three (3) times daily for 3 days. 24 Tab 1    albuterol (PROVENTIL HFA, VENTOLIN HFA, PROAIR HFA) 90 mcg/actuation inhaler Take 2 Puffs by inhalation every four (4) hours as needed for Wheezing. 1 Inhaler 0    amoxicillin-clavulanate (AUGMENTIN) 875-125 mg per tablet Take 1 Tab by mouth every twelve (12) hours for 10 days. 20 Tab 0    levonorgestrel (MIRENA) 20 mcg/24 hr (5 years) IUD 1 Device by IntraUTERine route once.  Indications: June 2018      multivitamin (ONE A DAY) tablet Take 1 Tab by mouth daily. No Known Allergies  Past Medical History:   Diagnosis Date    Anemia NEC     Herpes 3/2013    Migraine     Migraine     Other ill-defined conditions(799.89)     Left breast mass    Reactive airway disease     exercise induced     History reviewed. No pertinent surgical history. Family History   Problem Relation Age of Onset    Hypertension Mother     Other Mother         GERD    Hypertension Father     Arthritis-osteo Father     Other Sister         idiopathic allergic reaction     Social History     Tobacco Use    Smoking status: Never Smoker    Smokeless tobacco: Never Used   Substance Use Topics    Alcohol use: No        Review of Systems  Pertinent items are noted in HPI. Objective:     Visit Vitals  /85 (BP 1 Location: Right arm, BP Patient Position: Sitting)   Pulse (!) 109   Temp 98.3 °F (36.8 °C) (Oral)   Resp 16   Ht 5' 6.25\" (1.683 m)   Wt 186 lb 6.4 oz (84.6 kg)   SpO2 97%   BMI 29.86 kg/m²     General:  alert, cooperative, no distress   Eyes: negative   Ears: abnormal TM AS - erythematous, bulging, right TM grey   Sinuses: tenderness over bilateral frontal   Mouth:  Lips, mucosa, and tongue normal. Teeth and gums normal and abnormal findings: moderate oropharyngeal erythema   Neck: supple, symmetrical, trachea midline and no adenopathy. Heart: S1 and S2 normal, no murmurs noted. Lungs: clear to auscultation bilaterally   Abdomen: soft, non-tender. Bowel sounds normal. No masses,  no organomegaly        Assessment/Plan:   otitis media and sinusitis  Discussed the dx and tx of sinusitis. Suggested symptomatic OTC remedies. Suggested brief course of Afrin or similar. Nasal saline sprays for congestion. Antibiotics per orders. RTC prn. ICD-10-CM ICD-9-CM    1. Acute non-recurrent frontal sinusitis J01.10 461.1    2. Wheezing R06.2 786.07 albuterol (PROVENTIL HFA, VENTOLIN HFA, PROAIR HFA) 90 mcg/actuation inhaler   3.  Left non-suppurative otitis media H65.92 381. 4 pseudoephedrine (SUDAFED) 30 mg tablet      amoxicillin-clavulanate (AUGMENTIN) 875-125 mg per tablet   .

## 2018-11-30 NOTE — PATIENT INSTRUCTIONS
Sinusitis: Care Instructions  Your Care Instructions    Sinusitis is an infection of the lining of the sinus cavities in your head. Sinusitis often follows a cold. It causes pain and pressure in your head and face. In most cases, sinusitis gets better on its own in 1 to 2 weeks. But some mild symptoms may last for several weeks. Sometimes antibiotics are needed. Follow-up care is a key part of your treatment and safety. Be sure to make and go to all appointments, and call your doctor if you are having problems. It's also a good idea to know your test results and keep a list of the medicines you take. How can you care for yourself at home? · Take an over-the-counter pain medicine, such as acetaminophen (Tylenol), ibuprofen (Advil, Motrin), or naproxen (Aleve). Read and follow all instructions on the label. · If the doctor prescribed antibiotics, take them as directed. Do not stop taking them just because you feel better. You need to take the full course of antibiotics. · Be careful when taking over-the-counter cold or flu medicines and Tylenol at the same time. Many of these medicines have acetaminophen, which is Tylenol. Read the labels to make sure that you are not taking more than the recommended dose. Too much acetaminophen (Tylenol) can be harmful. · Breathe warm, moist air from a steamy shower, a hot bath, or a sink filled with hot water. Avoid cold, dry air. Using a humidifier in your home may help. Follow the directions for cleaning the machine. · Use saline (saltwater) nasal washes to help keep your nasal passages open and wash out mucus and bacteria. You can buy saline nose drops at a grocery store or drugstore. Or you can make your own at home by adding 1 teaspoon of salt and 1 teaspoon of baking soda to 2 cups of distilled water. If you make your own, fill a bulb syringe with the solution, insert the tip into your nostril, and squeeze gently. Jacquiline Husam your nose.   · Put a hot, wet towel or a warm gel pack on your face 3 or 4 times a day for 5 to 10 minutes each time. · Try a decongestant nasal spray like oxymetazoline (Afrin). Do not use it for more than 3 days in a row. Using it for more than 3 days can make your congestion worse. When should you call for help? Call your doctor now or seek immediate medical care if:    · You have new or worse swelling or redness in your face or around your eyes.     · You have a new or higher fever.    Watch closely for changes in your health, and be sure to contact your doctor if:    · You have new or worse facial pain.     · The mucus from your nose becomes thicker (like pus) or has new blood in it.     · You are not getting better as expected. Where can you learn more? Go to http://kip-candelario.info/. Enter L948 in the search box to learn more about \"Sinusitis: Care Instructions. \"  Current as of: March 28, 2018  Content Version: 11.8  © 6214-6691 [employer]. Care instructions adapted under license by [employer] (which disclaims liability or warranty for this information). If you have questions about a medical condition or this instruction, always ask your healthcare professional. [place] [address] any warranty or liability for your use of this information.

## 2018-11-30 NOTE — PROGRESS NOTES
Pt complains of sore throat, nasal congestion, cough. Pt started getting shrt of breath after starting alkaseltser cold and flu.

## 2019-04-17 ENCOUNTER — OFFICE VISIT (OUTPATIENT)
Dept: INTERNAL MEDICINE CLINIC | Age: 24
End: 2019-04-17

## 2019-04-17 VITALS
SYSTOLIC BLOOD PRESSURE: 90 MMHG | TEMPERATURE: 98.1 F | BODY MASS INDEX: 29.09 KG/M2 | RESPIRATION RATE: 14 BRPM | DIASTOLIC BLOOD PRESSURE: 70 MMHG | WEIGHT: 181 LBS | OXYGEN SATURATION: 98 % | HEIGHT: 66 IN | HEART RATE: 82 BPM

## 2019-04-17 DIAGNOSIS — R25.1 TREMOR, UNSPECIFIED: ICD-10-CM

## 2019-04-17 DIAGNOSIS — F41.8 SITUATIONAL ANXIETY: Primary | ICD-10-CM

## 2019-04-17 RX ORDER — GLUCOSAMINE SULFATE 1500 MG
POWDER IN PACKET (EA) ORAL DAILY
COMMUNITY
End: 2019-08-27

## 2019-04-17 RX ORDER — LORAZEPAM 0.5 MG/1
TABLET ORAL
Qty: 10 TAB | Refills: 0 | Status: SHIPPED | OUTPATIENT
Start: 2019-04-17 | End: 2020-05-22

## 2019-04-17 RX ORDER — PHENTERMINE HYDROCHLORIDE 30 MG/1
1 CAPSULE ORAL DAILY
Refills: 0 | COMMUNITY
Start: 2019-04-11 | End: 2019-08-27

## 2019-04-17 RX ORDER — PHENDIMETRAZINE TARTRATE 35 MG/1
35 TABLET ORAL DAILY
Refills: 0 | COMMUNITY
Start: 2019-03-22 | End: 2019-04-17 | Stop reason: ALTCHOICE

## 2019-04-17 NOTE — PROGRESS NOTES
20 yo female in for medication refill. 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 She has a job offer, too. She is experiencing anxiety, and would like some medication to use prn as she did several years ago. Sleep is better. Anxiety is presented as mild tremor, sweating palms and axillae and IBS; no palpitations. She is on a weight loss medication through [facility]. PE WNWD BF is alert and calm, and makes eye contact throughout our encounter   BP - 90/70 (just came from gym; no sxs)   Heart - RRR   Lungs - clear    Imp: Situational Anxiety     Plan: Lorazepam 0.5 mg 1/2 to one tab daily prn ( consulted)   She will see Dr. Handy Gonzalez later this year for physical  ___________________________  Expected course of current diagnosed problem(s) as well as expected progression and possible complications, and desired follow up with provider are discussed with patient. Patient is encouraged to be back in touch with any questions or concerns. Patient expresses understanding of plan of care. Patient is given AVS which includes diagnoses, current medications, vitals.

## 2019-04-17 NOTE — PROGRESS NOTES
Chief Complaint   Patient presents with    Medication Refill     Reviewed record in preparation for visit and have obtained necessary documentation. Identified pt with two pt identifiers(name and ). Health Maintenance Due   Topic    Pneumococcal 0-64 years (1 of 1 - PPSV23)    DTaP/Tdap/Td series (2 - Td)         Chief Complaint   Patient presents with    Medication Refill        Wt Readings from Last 3 Encounters:   19 181 lb (82.1 kg)   18 186 lb 6.4 oz (84.6 kg)   10/30/18 185 lb 3.2 oz (84 kg)     Temp Readings from Last 3 Encounters:   19 98.1 °F (36.7 °C) (Oral)   18 98.3 °F (36.8 °C) (Oral)   10/30/18 98.5 °F (36.9 °C) (Oral)     BP Readings from Last 3 Encounters:   19 90/70   18 128/85   10/30/18 118/64     Pulse Readings from Last 3 Encounters:   19 82   18 (!) 109   10/30/18 79           Learning Assessment:  :     Learning Assessment 2018   PRIMARY LEARNER Patient Patient   HIGHEST LEVEL OF EDUCATION - PRIMARY LEARNER  4 YEARS OF COLLEGE 2 YEARS OF COLLEGE   BARRIERS PRIMARY LEARNER NONE NONE   CO-LEARNER CAREGIVER No No   PRIMARY LANGUAGE ENGLISH ENGLISH   LEARNER PREFERENCE PRIMARY VIDEOS DEMONSTRATION     READING -   ANSWERED BY patient Patient   RELATIONSHIP SELF SELF       Depression Screening:  :     3 most recent PHQ Screens 2019   Little interest or pleasure in doing things Not at all   Feeling down, depressed, irritable, or hopeless Not at all   Total Score PHQ 2 0       Fall Risk Assessment:  :     No flowsheet data found. Abuse Screening:  :     Abuse Screening Questionnaire 2018   Do you ever feel afraid of your partner? N   Are you in a relationship with someone who physically or mentally threatens you? N   Is it safe for you to go home?  Y       Coordination of Care Questionnaire:  :     1) Have you been to an emergency room, urgent care clinic since your last visit? no   Hospitalized since your last visit? no             2) Have you seen or consulted any other health care providers outside of [address] [place] [address] since your last visit? no  (Include any pap smears or colon screenings in this section.)    3) Do you have an Advance Directive on file? no    4) Are you interested in receiving information on Advance Directives? NO      Patient is accompanied by self I have received verbal consent from [facility] to discuss any/all medical information while they are present in the room. Reviewed record  In preparation for visit and have obtained necessary documentation.

## 2019-04-18 LAB
ALBUMIN SERPL-MCNC: 4.4 G/DL (ref 3.5–5.5)
ALBUMIN/GLOB SERPL: 1.8 {RATIO} (ref 1.2–2.2)
ALP SERPL-CCNC: 43 IU/L (ref 39–117)
ALT SERPL-CCNC: 25 IU/L (ref 0–32)
AST SERPL-CCNC: 20 IU/L (ref 0–40)
BASOPHILS # BLD AUTO: 0 X10E3/UL (ref 0–0.2)
BASOPHILS NFR BLD AUTO: 1 %
BILIRUB SERPL-MCNC: 0.3 MG/DL (ref 0–1.2)
BUN SERPL-MCNC: 14 MG/DL (ref 6–20)
BUN/CREAT SERPL: 19 (ref 9–23)
CALCIUM SERPL-MCNC: 8.9 MG/DL (ref 8.7–10.2)
CHLORIDE SERPL-SCNC: 103 MMOL/L (ref 96–106)
CO2 SERPL-SCNC: 23 MMOL/L (ref 20–29)
CREAT SERPL-MCNC: 0.72 MG/DL (ref 0.57–1)
EOSINOPHIL # BLD AUTO: 0.3 X10E3/UL (ref 0–0.4)
EOSINOPHIL NFR BLD AUTO: 7 %
ERYTHROCYTE [DISTWIDTH] IN BLOOD BY AUTOMATED COUNT: 13.8 % (ref 12.3–15.4)
GLOBULIN SER CALC-MCNC: 2.5 G/DL (ref 1.5–4.5)
GLUCOSE SERPL-MCNC: 77 MG/DL (ref 65–99)
HCT VFR BLD AUTO: 42.8 % (ref 34–46.6)
HGB BLD-MCNC: 13.7 G/DL (ref 11.1–15.9)
IMM GRANULOCYTES # BLD AUTO: 0 X10E3/UL (ref 0–0.1)
IMM GRANULOCYTES NFR BLD AUTO: 0 %
LYMPHOCYTES # BLD AUTO: 1.4 X10E3/UL (ref 0.7–3.1)
LYMPHOCYTES NFR BLD AUTO: 36 %
MCH RBC QN AUTO: 27.8 PG (ref 26.6–33)
MCHC RBC AUTO-ENTMCNC: 32 G/DL (ref 31.5–35.7)
MCV RBC AUTO: 87 FL (ref 79–97)
MONOCYTES # BLD AUTO: 0.5 X10E3/UL (ref 0.1–0.9)
MONOCYTES NFR BLD AUTO: 12 %
NEUTROPHILS # BLD AUTO: 1.8 X10E3/UL (ref 1.4–7)
NEUTROPHILS NFR BLD AUTO: 44 %
PLATELET # BLD AUTO: 298 X10E3/UL (ref 150–379)
POTASSIUM SERPL-SCNC: 4.6 MMOL/L (ref 3.5–5.2)
PROT SERPL-MCNC: 6.9 G/DL (ref 6–8.5)
RBC # BLD AUTO: 4.93 X10E6/UL (ref 3.77–5.28)
SODIUM SERPL-SCNC: 141 MMOL/L (ref 134–144)
WBC # BLD AUTO: 4.1 X10E3/UL (ref 3.4–10.8)

## 2019-08-26 NOTE — PROGRESS NOTES
Subjective:      Gabriele Noe is a 21 y.o. female who presents today for her annual exam.     Gyn care is with [facility] Dr. Wilhelm Ranks- 4/2019    She is concerned about weight gain. She was taking phentermine and working with the [facility], but time and expense caused her to stop that program.  She has gained the weight back, especially eating more carbs than what the [facility] clinic had her doing. She is exercising some. Due to her schedule, she is usually picking up \"quick\" foods, that could be better. 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 Objective: Wt Readings from Last 3 Encounters:   08/27/19 199 lb (90.3 kg)   04/17/19 181 lb (82.1 kg)   11/30/18 186 lb 6.4 oz (84.6 kg)       Visit Vitals  /60 (BP 1 Location: Left arm, BP Patient Position: Sitting)   Pulse 76   Temp 98.4 °F (36.9 °C) (Oral)   Resp 16   Ht 5' 6\" (1.676 m)   Wt 199 lb (90.3 kg)   LMP 08/08/2019   SpO2 99%   BMI 32.12 kg/m²     General:  Alert, cooperative, no distress, appears stated age. Head:  Normocephalic, without obvious abnormality, atraumatic. Eyes:  Conjunctivae/corneas clear. PERRL, EOMs intact. Ears:  Normal TMs and external ear canals both ears.    Nose: Nares normal. Septum midline. Mucosa normal. No drainage or sinus tenderness. Throat: Lips, mucosa, and tongue normal. Teeth and gums normal.   Neck: Supple, symmetrical, trachea midline, no adenopathy, thyroid: no enlargement/tenderness/nodules, no carotid bruit and no JVD. Back:   Symmetric, no curvature. ROM normal. No CVA tenderness. Lungs:   Clear to auscultation bilaterally. Chest wall:  No tenderness or deformity. Heart:  Regular rate and rhythm, S1, S2 normal, no murmur, click, rub or gallop. Breast Exam:  No tenderness, masses, or nipple abnormality. Abdomen:   Soft, non-tender. Bowel sounds normal. No masses,  No organomegaly. Extremities: Extremities normal, atraumatic, no cyanosis or edema. Pulses: 2+ and symmetric all extremities. Skin: Skin color, texture, turgor normal. No rashes or lesions. Lymph nodes: Cervical, supraclavicular, and axillary nodes normal.       Assessment/Plan:     1. Annual physical exam  -recommended she work on establishing a good exercise routine for now. Once the exercise becomes a habit, then work on diet routine.   -up to date with gyn care. - METABOLIC PANEL, COMPREHENSIVE  - CBC WITH AUTOMATED DIFF  - LIPID PANEL  - TSH 3RD GENERATION  - T4, FREE     Follow-up Disposition:     Follow up yearly     Return if symptoms worsen or fail to improve. Advised patient to call back or return to office if symptoms worsen/change/persist.     Discussed expected course/resolution/complications of diagnosis in detail with patient. Medication risks/benefits/costs/interactions/alternatives discussed with patient. Patient was given an after visit summary which includes diagnoses, current medications, & vitals. Patient expressed understanding with the diagnosis and plan.

## 2019-08-27 ENCOUNTER — OFFICE VISIT (OUTPATIENT)
Dept: INTERNAL MEDICINE CLINIC | Age: 24
End: 2019-08-27

## 2019-08-27 VITALS
SYSTOLIC BLOOD PRESSURE: 130 MMHG | BODY MASS INDEX: 31.98 KG/M2 | HEART RATE: 76 BPM | DIASTOLIC BLOOD PRESSURE: 60 MMHG | HEIGHT: 66 IN | OXYGEN SATURATION: 99 % | WEIGHT: 199 LBS | RESPIRATION RATE: 16 BRPM | TEMPERATURE: 98.4 F

## 2019-08-27 DIAGNOSIS — Z00.00 ANNUAL PHYSICAL EXAM: Primary | ICD-10-CM

## 2019-08-29 LAB
ALBUMIN SERPL-MCNC: 4.1 G/DL (ref 3.5–5.5)
ALBUMIN/GLOB SERPL: 1.6 {RATIO} (ref 1.2–2.2)
ALP SERPL-CCNC: 44 IU/L (ref 39–117)
ALT SERPL-CCNC: 20 IU/L (ref 0–32)
AST SERPL-CCNC: 17 IU/L (ref 0–40)
BASOPHILS # BLD AUTO: 0 X10E3/UL (ref 0–0.2)
BASOPHILS NFR BLD AUTO: 1 %
BILIRUB SERPL-MCNC: <0.2 MG/DL (ref 0–1.2)
BUN SERPL-MCNC: 12 MG/DL (ref 6–20)
BUN/CREAT SERPL: 18 (ref 9–23)
CALCIUM SERPL-MCNC: 8.7 MG/DL (ref 8.7–10.2)
CHLORIDE SERPL-SCNC: 105 MMOL/L (ref 96–106)
CHOLEST SERPL-MCNC: 116 MG/DL (ref 100–199)
CO2 SERPL-SCNC: 22 MMOL/L (ref 20–29)
CREAT SERPL-MCNC: 0.67 MG/DL (ref 0.57–1)
EOSINOPHIL # BLD AUTO: 0.3 X10E3/UL (ref 0–0.4)
EOSINOPHIL NFR BLD AUTO: 5 %
ERYTHROCYTE [DISTWIDTH] IN BLOOD BY AUTOMATED COUNT: 12.1 % (ref 12.3–15.4)
GLOBULIN SER CALC-MCNC: 2.6 G/DL (ref 1.5–4.5)
GLUCOSE SERPL-MCNC: 95 MG/DL (ref 65–99)
HCT VFR BLD AUTO: 40.7 % (ref 34–46.6)
HDLC SERPL-MCNC: 58 MG/DL
HGB BLD-MCNC: 12.9 G/DL (ref 11.1–15.9)
IMM GRANULOCYTES # BLD AUTO: 0 X10E3/UL (ref 0–0.1)
IMM GRANULOCYTES NFR BLD AUTO: 0 %
INTERPRETATION, 910389: NORMAL
LDLC SERPL CALC-MCNC: 47 MG/DL (ref 0–99)
LYMPHOCYTES # BLD AUTO: 2.2 X10E3/UL (ref 0.7–3.1)
LYMPHOCYTES NFR BLD AUTO: 41 %
MCH RBC QN AUTO: 26.5 PG (ref 26.6–33)
MCHC RBC AUTO-ENTMCNC: 31.7 G/DL (ref 31.5–35.7)
MCV RBC AUTO: 84 FL (ref 79–97)
MONOCYTES # BLD AUTO: 0.6 X10E3/UL (ref 0.1–0.9)
MONOCYTES NFR BLD AUTO: 12 %
NEUTROPHILS # BLD AUTO: 2.2 X10E3/UL (ref 1.4–7)
NEUTROPHILS NFR BLD AUTO: 41 %
PLATELET # BLD AUTO: 308 X10E3/UL (ref 150–450)
POTASSIUM SERPL-SCNC: 4.2 MMOL/L (ref 3.5–5.2)
PROT SERPL-MCNC: 6.7 G/DL (ref 6–8.5)
RBC # BLD AUTO: 4.87 X10E6/UL (ref 3.77–5.28)
SODIUM SERPL-SCNC: 140 MMOL/L (ref 134–144)
T4 FREE SERPL-MCNC: 1.13 NG/DL (ref 0.82–1.77)
TRIGL SERPL-MCNC: 55 MG/DL (ref 0–149)
TSH SERPL DL<=0.005 MIU/L-ACNC: 2.72 UIU/ML (ref 0.45–4.5)
VLDLC SERPL CALC-MCNC: 11 MG/DL (ref 5–40)
WBC # BLD AUTO: 5.4 X10E3/UL (ref 3.4–10.8)

## 2019-10-17 NOTE — MR AVS SNAPSHOT
[address] [place] [address], [address] [place] [address] 
[phone] Patient: Emery Kay MRN: [id number] :1995 Visit Information Date & Time Provider Department Dept. Phone Encounter #  
 2018 10:40 AM [facility]NC[facility] [phone] Upcoming Health Maintenance Date Due Influenza Age 5 to Adult 2018 Pneumococcal 19-64 Medium Risk (1 of 1 - PPSV23) 2019* PAP AKA CERVICAL CYTOLOGY 2020 DTaP/Tdap/Td series (2 - Td) 2024 *Topic was postponed. The date shown is not the original due date. Allergies as of 2018  Review Complete On: 2018 By: Vu Cruz NP No Known Allergies Current Immunizations  Reviewed on 2018 Name Date DTP 2005, 1998, 1994, 1994, 1994 HPV 2008, 2008, 3/1/2008 HPV (9-valent) 2008, 2008 Hep B Vaccine 1994, 1994, 1994 Hib 1995, 1994, 1994, 1994 Influenza Vaccine (Quad) PF 2017 MMR 1999, 1995 Meningococcal (MCV4) Vaccine 2012, 2009 Poliovirus vaccine 1998, 1995, 1994, 1994 Tdap 2014 Not reviewed this visit You Were Diagnosed With   
  
 Codes Comments Rash and nonspecific skin eruption    -  Primary ICD-10-CM: R21 
ICD-9-CM: 782.1 Vitals BP Pulse Temp Resp Height(growth percentile) Weight(growth percentile) 122/84 66 96.6 °F (35.9 °C) (Oral) 16 5' 6.25\" (1.683 m) 182 lb (82.6 kg) LMP SpO2 BMI OB Status Smoking Status 2018 98% 29.15 kg/m2 Having regular periods Never Smoker Vitals History BMI and BSA Data Body Mass Index Body Surface Area  
 29.15 kg/m 2 1.96 m 2 Preferred Pharmacy Pharmacy Name Phone [facility] [place] [facility] [address] [phone] Addended by: RIA JUNG on: 12/10/2018 08:40 AM     Modules accepted: Orders     Your Updated Medication List  
  
   
This list is accurate as of 8/20/18 11:17 AM.  Always use your most recent med list.  
  
  
  
  
 LORazepam 0.5 mg tablet Commonly known as:  ATIVAN  
1/2 to 1 whole tab daily as needed. MIRENA 20 mcg/24 hr (5 years) Iud  
Generic drug:  levonorgestrel 1 Device by IntraUTERine route once. Indications: June 2018  
  
 multivitamin tablet Commonly known as:  ONE A DAY Take 1 Tab by mouth daily. triamcinolone acetonide 0.1 % ointment Commonly known as:  KENALOG  
use thin layer twice a day to rash on top of shoulders Prescriptions Sent to Pharmacy Refills  
 triamcinolone acetonide (KENALOG) 0.1 % ointment 0 Sig: use thin layer twice a day to rash on top of shoulders Class: Normal  
 Pharmacy: [place], [address] [facility] #: [phone] We Performed the Following CULTURE, FUNGUS [id number] CPT(R)] Patient Instructions Lamisil topical to shoulder rash areas twice a day for ONE WEEK, then stop and use only the steroid as needed 
______________________________________________ Ringworm: Care Instructions Your Care Instructions Ringworm is a fungus infection of the skin. It is not caused by a worm. Ringworm causes a round, scaly rash that may crack and itch. The rash can spread over a wide area. One type of fungus that causes ringworm is often found in locker rooms and swimming pools. It grows well in warm, moist areas of the skin, such as in skin folds. You can get ringworm by sharing towels, clothing, and sports equipment. You can also get it by touching someone who has ringworm. Ringworm is treated with cream that kills the fungus. If the rash is widespread, you may need pills to get rid of it. Ringworm often comes back after treatment. If the rash becomes infected with bacteria, you may need antibiotics. Follow-up care is a key part of your treatment and safety. Be sure to make and go to all appointments, and call your doctor if you are having problems. It's also a good idea to know your test results and keep a list of the medicines you take. How can you care for yourself at home? · Take your medicines exactly as prescribed. Call your doctor if you have any problems with your medicine. · Wash the rash with soap and water, remove flaky skin, and dry thoroughly. · Try an over-the-counter cream with clotrimazole or miconazole in it. Brand names include Lotrimin, Micatin, and Tinactin. Terbinafine cream (Lamisil) is also available without a prescription. Spread the cream beyond the edge or border of the rash. Follow the directions on the package. Do not stop using the medicine just because your skin clears up. You will probably need to continue treatment for 2 to 4 weeks. · To keep from getting another infection: ¨ Do not go barefoot in public places such as gyms or locker rooms. Avoid sharing towels and clothes. Use flip-flops or some other type of shoe in the shower. ¨ Do not wear tight clothes or let your skin stay damp for long periods, such as by staying in a wet bathing suit or sweaty clothes. When should you call for help? Call your doctor now or seek immediate medical care if: 
  · You have signs of infection such as: 
¨ Pain, warmth, or swelling in your skin. ¨ Red streaks near a wound in the skin. ¨ Pus coming from the rash on your skin. ¨ A fever.  
 Watch closely for changes in your health, and be sure to contact your doctor if: 
  · Your ringworm does not improve after 2 weeks of treatment.  
  · You do not get better as expected. Where can you learn more? Go to http://kip-candelario.info/. Enter P753 in the search box to learn more about \"Ringworm: Care Instructions. \" Current as of: October 5, 2017 Content Version: 11.7 © 8402-0647 Healthwise, Incorporated. Care instructions adapted under license by [employer] (which disclaims liability or warranty for this information). If you have questions about a medical condition or this instruction, always ask your healthcare professional. [address] any warranty or liability for your use of this information. Introducing [facility]! Dear Ra Kelsey: Thank you for requesting a [employer] account. Our records indicate that you already have an active [employer] account. You can access your account anytime at [email or web address] Did you know that you can access your hospital and ER discharge instructions at any time in [employer]? You can also review all of your test results from your hospital stay or ER visit. Additional Information If you have questions, please visit the Frequently Asked Questions section of the [employer] website at [email or web address] Remember, [employer] is NOT to be used for urgent needs. For medical emergencies, dial 911. Now available from your iPhone and Android! Please provide this summary of care documentation to your next provider. Your primary care clinician is listed as Jasmin Ge. If you have any questions after today's visit, please call [phone]. Patient is a 81y old  Female who presents with a chief complaint of cervical spine fracture (13 Oct 2019 04:38)      SUBJECTIVE / OVERNIGHT EVENTS:  No events overnight. Still some pain in cervical spine, otherwise no complaints.  Called and discussed with neurosurgery who has no objection to starting ASA on patient.   ROS:  All other review of systems negative    Allergies    No Known Allergies    Intolerances        MEDICATIONS  (STANDING):  levothyroxine 50 MICROGram(s) Oral daily  simvastatin 10 milliGRAM(s) Oral at bedtime    MEDICATIONS  (PRN):      Vital Signs Last 24 Hrs  T(C): 36.6 (17 Oct 2019 14:01), Max: 36.6 (16 Oct 2019 20:19)  T(F): 97.9 (17 Oct 2019 14:01), Max: 97.9 (16 Oct 2019 20:19)  HR: 78 (17 Oct 2019 14:01) (74 - 92)  BP: 165/76 (17 Oct 2019 14:01) (155/75 - 165/76)  BP(mean): --  RR: 16 (17 Oct 2019 14:01) (16 - 18)  SpO2: 94% (17 Oct 2019 14:01) (94% - 97%)    I&O's Summary    14 Oct 2019 07:  -  15 Oct 2019 07:00  --------------------------------------------------------  IN: 510 mL / OUT: 1550 mL / NET: -1040 mL    15 Oct 2019 07:01  -  15 Oct 2019 16:20  --------------------------------------------------------  IN: 480 mL / OUT: 200 mL / NET: 280 mL            PHYSICAL EXAM:  GENERA: NAD  HEAD:  Normocephalic, Right forehead hematoma  EYES: EOMI, conjunctiva and sclera clear  NECK: Cervical Collar in place  CHEST/LUNG: Clear to auscultation bilaterally; No wheeze  HEART: Regular rate and rhythm; No murmurs, rubs, or gallops  ABDOMEN: Soft, Nontender, Nondistended; Bowel sounds present  EXTREMITIES:  Left arm in splint with improved swelling of hand, but still discoloariotn.    NEUROLOGY: AAOx3, 5/5 UE and LE strength.   PSYCH: calm  SKIN: No rashes or lesions  Tele:off tele.   LABS:                                                       12.9   12.13 )-----------( 240      ( 16 Oct 2019 06:35 )             39.2       Color: Yellow / Appearance: Clear / S.023 / pH: x  Gluc: x / Ketone: Negative  / Bili: Negative / Urobili: Negative   Blood: x / Protein: Trace / Nitrite: Negative   Leuk Esterase: Negative / RBC: 3 /hpf / WBC 1 /HPF   Sq Epi: x / Non Sq Epi: 1 /hpf / Bacteria: Negative        RADIOLOGY & ADDITIONAL TESTS:  < from: MR Angio Head w/wo IV Cont (10.15.19 @ 21:01) >  MRA NECK:    Axial fat-saturated T1-weighted images demonstrate crescentic high signal   at the level of the left C2 foramina transversarium, however, its flow   void is not narrowed, and the vessel is well seen on the prior CTA at   this level. Arterial dissection is not strongly suspected, nor is it   entirely excluded.    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    Redemonstration of narrowing of the left internal carotid artery   beginning at its origin, and progressing to long segment moderate to   severe stenosis is coarse throughout the neck.    MRA BRAIN:    Moderate to severe long segment stenosis of the left skull base and   supraclinoid ICA. The left MCA is of normal caliber, but enhances to a   lesser degree than the right MCA.    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 This   corresponds to a region of ill-defined enhancement seen on the prior CTA   and may be within or adjacent to the aneurysm.        < end of copied text >    Imaging Personally Reviewed:    Consultant(s) Notes Reviewed:      Care Discussed with Consultants/Other Providers: Vascular cardiology attending,  neurosurgery resident, hand surgrey attending.     Case Discussed with Family:    Goals of Care:

## 2020-04-03 ENCOUNTER — PATIENT MESSAGE (OUTPATIENT)
Dept: INTERNAL MEDICINE CLINIC | Age: 25
End: 2020-04-03

## 2020-04-03 NOTE — TELEPHONE ENCOUNTER
From: Andrew Cortes  To: Carol Essex, MD  Sent: 4/3/2020 9:09 AM EDT  Subject: Non-Urgent Medical Question    Good morning,     I have been having some concerns with constipation for over a month now. My diet changed from keto to loss carb after. I go days without a bowel movement and if often strain. I drink over 2 l of water a day. I have gone as far as stool softeners, laxatives and a fleet enema with little production. I was wondering if we could try Linzess?     Kylee LOV 09/16/2019  F/U 01/17/2020

## 2020-05-18 NOTE — PERIOP NOTES
PATIENT CALLED AND MADE AWARE OF COVID-19 TESTING NEEDED TO BE DONE WITHIN 96 HOURS OF SURGERY. COVID-19 TESTING APPOINTMENT MADE FOR PATIENT. PATIENT INSTRUCTED ON SELF QUARANTINE BETWEEN TESTING AND ARRIVAL TIME DAY OF SURGERY.

## 2020-05-22 NOTE — PERIOP NOTES
PAT TELEPHONE INTERVIEW COMPLETED. PRE-OP INSTRUCTIONS REVIEWED WITH PATIENT WHICH INCLUDED INSTRUCTIONS ON MEDICATIONS AND NPO AFTER MIDNIGHT. INSTRUCTIONS GIVEN ON THE USE OF CHLORHEXIDINE SOLUTION THE EVENING BEFORE AND THE MORNING OF SURGERY PATIENT VOICED UNDERSTANDING OF SAME.   PATIENT IS AWARE OF ARRIVAL POLICY NOW IN EFFECT FOR THE DAY OF SURGERY

## 2020-05-23 ENCOUNTER — HOSPITAL ENCOUNTER (OUTPATIENT)
Dept: PREADMISSION TESTING | Age: 25
Discharge: HOME | End: 2020-05-23
Payer: COMMERCIAL

## 2020-05-23 DIAGNOSIS — U07.1 COVID-19: ICD-10-CM

## 2020-05-23 PROCEDURE — 87635 SARS-COV-2 COVID-19 AMP PRB: CPT

## 2020-05-24 LAB — SARS-COV-2, COV2NT: NOT DETECTED

## 2020-05-26 ENCOUNTER — ANESTHESIA EVENT (OUTPATIENT)
Dept: SURGERY | Age: 25
End: 2020-05-26
Payer: COMMERCIAL

## 2020-05-27 ENCOUNTER — ANESTHESIA (OUTPATIENT)
Dept: SURGERY | Age: 25
End: 2020-05-27
Payer: COMMERCIAL

## 2020-05-27 ENCOUNTER — HOSPITAL ENCOUNTER (OUTPATIENT)
Age: 25
Setting detail: OUTPATIENT SURGERY
Discharge: HOME | End: 2020-05-27
Attending: ORTHOPAEDIC SURGERY | Admitting: ORTHOPAEDIC SURGERY
Payer: COMMERCIAL

## 2020-05-27 VITALS
TEMPERATURE: 98 F | DIASTOLIC BLOOD PRESSURE: 76 MMHG | OXYGEN SATURATION: 98 % | HEIGHT: 67 IN | RESPIRATION RATE: 14 BRPM | SYSTOLIC BLOOD PRESSURE: 120 MMHG | BODY MASS INDEX: 29.82 KG/M2 | HEART RATE: 71 BPM | WEIGHT: 190 LBS

## 2020-05-27 DIAGNOSIS — G56.02 LEFT CARPAL TUNNEL SYNDROME: Primary | Chronic | ICD-10-CM

## 2020-05-27 LAB — HCG UR QL: NEGATIVE

## 2020-05-27 PROCEDURE — 74011000250 HC RX REV CODE- 250: Performed by: ORTHOPAEDIC SURGERY

## 2020-05-27 PROCEDURE — 77030040356 HC CORD BPLR FRCP COVD -A: Performed by: ORTHOPAEDIC SURGERY

## 2020-05-27 PROCEDURE — 74011000250 HC RX REV CODE- 250: Performed by: NURSE ANESTHETIST, CERTIFIED REGISTERED

## 2020-05-27 PROCEDURE — 77030020754 HC CUF TRNQT 2BLA STRY -B: Performed by: ORTHOPAEDIC SURGERY

## 2020-05-27 PROCEDURE — 76210000021 HC REC RM PH II 0.5 TO 1 HR: Performed by: ORTHOPAEDIC SURGERY

## 2020-05-27 PROCEDURE — 76210000016 HC OR PH I REC 1 TO 1.5 HR: Performed by: ORTHOPAEDIC SURGERY

## 2020-05-27 PROCEDURE — 74011250636 HC RX REV CODE- 250/636: Performed by: NURSE ANESTHETIST, CERTIFIED REGISTERED

## 2020-05-27 PROCEDURE — 76060000032 HC ANESTHESIA 0.5 TO 1 HR: Performed by: ORTHOPAEDIC SURGERY

## 2020-05-27 PROCEDURE — 77030010509 HC AIRWY LMA MSK TELE -A: Performed by: NURSE ANESTHETIST, CERTIFIED REGISTERED

## 2020-05-27 PROCEDURE — 76010000138 HC OR TIME 0.5 TO 1 HR: Performed by: ORTHOPAEDIC SURGERY

## 2020-05-27 PROCEDURE — 77030002916 HC SUT ETHLN J&J -A: Performed by: ORTHOPAEDIC SURGERY

## 2020-05-27 PROCEDURE — 81025 URINE PREGNANCY TEST: CPT

## 2020-05-27 PROCEDURE — 77030006689 HC BLD OPHTH BVR BD -A: Performed by: ORTHOPAEDIC SURGERY

## 2020-05-27 PROCEDURE — 77030018836 HC SOL IRR NACL ICUM -A: Performed by: ORTHOPAEDIC SURGERY

## 2020-05-27 PROCEDURE — 74011250637 HC RX REV CODE- 250/637: Performed by: ANESTHESIOLOGY

## 2020-05-27 PROCEDURE — 74011250636 HC RX REV CODE- 250/636: Performed by: ANESTHESIOLOGY

## 2020-05-27 RX ORDER — MIDAZOLAM HYDROCHLORIDE 1 MG/ML
INJECTION, SOLUTION INTRAMUSCULAR; INTRAVENOUS AS NEEDED
Status: DISCONTINUED | OUTPATIENT
Start: 2020-05-27 | End: 2020-05-27 | Stop reason: HOSPADM

## 2020-05-27 RX ORDER — ONDANSETRON 2 MG/ML
4 INJECTION INTRAMUSCULAR; INTRAVENOUS AS NEEDED
Status: DISCONTINUED | OUTPATIENT
Start: 2020-05-27 | End: 2020-05-27 | Stop reason: HOSPADM

## 2020-05-27 RX ORDER — SODIUM CHLORIDE 0.9 % (FLUSH) 0.9 %
5-40 SYRINGE (ML) INJECTION EVERY 8 HOURS
Status: DISCONTINUED | OUTPATIENT
Start: 2020-05-27 | End: 2020-05-27 | Stop reason: HOSPADM

## 2020-05-27 RX ORDER — HYDROCODONE BITARTRATE AND ACETAMINOPHEN 5; 325 MG/1; MG/1
1 TABLET ORAL
Qty: 15 TAB | Refills: 0 | Status: SHIPPED | OUTPATIENT
Start: 2020-05-27 | End: 2020-06-03

## 2020-05-27 RX ORDER — SODIUM CHLORIDE 0.9 % (FLUSH) 0.9 %
5-40 SYRINGE (ML) INJECTION AS NEEDED
Status: DISCONTINUED | OUTPATIENT
Start: 2020-05-27 | End: 2020-05-27 | Stop reason: HOSPADM

## 2020-05-27 RX ORDER — BUPIVACAINE HYDROCHLORIDE 5 MG/ML
INJECTION, SOLUTION EPIDURAL; INTRACAUDAL AS NEEDED
Status: DISCONTINUED | OUTPATIENT
Start: 2020-05-27 | End: 2020-05-27 | Stop reason: HOSPADM

## 2020-05-27 RX ORDER — SODIUM CHLORIDE, SODIUM LACTATE, POTASSIUM CHLORIDE, CALCIUM CHLORIDE 600; 310; 30; 20 MG/100ML; MG/100ML; MG/100ML; MG/100ML
125 INJECTION, SOLUTION INTRAVENOUS CONTINUOUS
Status: DISCONTINUED | OUTPATIENT
Start: 2020-05-27 | End: 2020-05-27 | Stop reason: HOSPADM

## 2020-05-27 RX ORDER — HYDROMORPHONE HYDROCHLORIDE 1 MG/ML
0.2 INJECTION, SOLUTION INTRAMUSCULAR; INTRAVENOUS; SUBCUTANEOUS
Status: DISCONTINUED | OUTPATIENT
Start: 2020-05-27 | End: 2020-05-27 | Stop reason: HOSPADM

## 2020-05-27 RX ORDER — ONDANSETRON 2 MG/ML
INJECTION INTRAMUSCULAR; INTRAVENOUS AS NEEDED
Status: DISCONTINUED | OUTPATIENT
Start: 2020-05-27 | End: 2020-05-27 | Stop reason: HOSPADM

## 2020-05-27 RX ORDER — SODIUM CHLORIDE 9 MG/ML
25 INJECTION, SOLUTION INTRAVENOUS CONTINUOUS
Status: DISCONTINUED | OUTPATIENT
Start: 2020-05-27 | End: 2020-05-27 | Stop reason: HOSPADM

## 2020-05-27 RX ORDER — MIDAZOLAM HYDROCHLORIDE 1 MG/ML
1 INJECTION, SOLUTION INTRAMUSCULAR; INTRAVENOUS AS NEEDED
Status: DISCONTINUED | OUTPATIENT
Start: 2020-05-27 | End: 2020-05-27 | Stop reason: HOSPADM

## 2020-05-27 RX ORDER — ROPIVACAINE HYDROCHLORIDE 5 MG/ML
30 INJECTION, SOLUTION EPIDURAL; INFILTRATION; PERINEURAL ONCE
Status: DISCONTINUED | OUTPATIENT
Start: 2020-05-27 | End: 2020-05-27 | Stop reason: HOSPADM

## 2020-05-27 RX ORDER — PROPOFOL 10 MG/ML
INJECTION, EMULSION INTRAVENOUS AS NEEDED
Status: DISCONTINUED | OUTPATIENT
Start: 2020-05-27 | End: 2020-05-27 | Stop reason: HOSPADM

## 2020-05-27 RX ORDER — FENTANYL CITRATE 50 UG/ML
INJECTION, SOLUTION INTRAMUSCULAR; INTRAVENOUS AS NEEDED
Status: DISCONTINUED | OUTPATIENT
Start: 2020-05-27 | End: 2020-05-27 | Stop reason: HOSPADM

## 2020-05-27 RX ORDER — ALBUTEROL SULFATE 90 UG/1
AEROSOL, METERED RESPIRATORY (INHALATION)
COMMUNITY
End: 2022-03-14

## 2020-05-27 RX ORDER — DIPHENHYDRAMINE HYDROCHLORIDE 50 MG/ML
12.5 INJECTION, SOLUTION INTRAMUSCULAR; INTRAVENOUS AS NEEDED
Status: DISCONTINUED | OUTPATIENT
Start: 2020-05-27 | End: 2020-05-27 | Stop reason: HOSPADM

## 2020-05-27 RX ORDER — MIDAZOLAM HYDROCHLORIDE 1 MG/ML
0.5 INJECTION, SOLUTION INTRAMUSCULAR; INTRAVENOUS
Status: DISCONTINUED | OUTPATIENT
Start: 2020-05-27 | End: 2020-05-27 | Stop reason: HOSPADM

## 2020-05-27 RX ORDER — ACETAMINOPHEN 325 MG/1
650 TABLET ORAL ONCE
Status: COMPLETED | OUTPATIENT
Start: 2020-05-27 | End: 2020-05-27

## 2020-05-27 RX ORDER — SODIUM CHLORIDE, SODIUM LACTATE, POTASSIUM CHLORIDE, CALCIUM CHLORIDE 600; 310; 30; 20 MG/100ML; MG/100ML; MG/100ML; MG/100ML
75 INJECTION, SOLUTION INTRAVENOUS CONTINUOUS
Status: DISCONTINUED | OUTPATIENT
Start: 2020-05-27 | End: 2020-05-27 | Stop reason: HOSPADM

## 2020-05-27 RX ORDER — LIDOCAINE HYDROCHLORIDE 20 MG/ML
INJECTION, SOLUTION EPIDURAL; INFILTRATION; INTRACAUDAL; PERINEURAL AS NEEDED
Status: DISCONTINUED | OUTPATIENT
Start: 2020-05-27 | End: 2020-05-27 | Stop reason: HOSPADM

## 2020-05-27 RX ORDER — DEXAMETHASONE SODIUM PHOSPHATE 4 MG/ML
INJECTION, SOLUTION INTRA-ARTICULAR; INTRALESIONAL; INTRAMUSCULAR; INTRAVENOUS; SOFT TISSUE AS NEEDED
Status: DISCONTINUED | OUTPATIENT
Start: 2020-05-27 | End: 2020-05-27 | Stop reason: HOSPADM

## 2020-05-27 RX ORDER — LIDOCAINE HYDROCHLORIDE 10 MG/ML
0.1 INJECTION, SOLUTION EPIDURAL; INFILTRATION; INTRACAUDAL; PERINEURAL AS NEEDED
Status: DISCONTINUED | OUTPATIENT
Start: 2020-05-27 | End: 2020-05-27 | Stop reason: HOSPADM

## 2020-05-27 RX ORDER — FENTANYL CITRATE 50 UG/ML
25 INJECTION, SOLUTION INTRAMUSCULAR; INTRAVENOUS
Status: DISCONTINUED | OUTPATIENT
Start: 2020-05-27 | End: 2020-05-27 | Stop reason: HOSPADM

## 2020-05-27 RX ORDER — MORPHINE SULFATE 10 MG/ML
2 INJECTION, SOLUTION INTRAMUSCULAR; INTRAVENOUS
Status: DISCONTINUED | OUTPATIENT
Start: 2020-05-27 | End: 2020-05-27 | Stop reason: HOSPADM

## 2020-05-27 RX ORDER — FENTANYL CITRATE 50 UG/ML
50 INJECTION, SOLUTION INTRAMUSCULAR; INTRAVENOUS AS NEEDED
Status: DISCONTINUED | OUTPATIENT
Start: 2020-05-27 | End: 2020-05-27 | Stop reason: HOSPADM

## 2020-05-27 RX ADMIN — MIDAZOLAM HYDROCHLORIDE 2 MG: 1 INJECTION, SOLUTION INTRAMUSCULAR; INTRAVENOUS at 12:41

## 2020-05-27 RX ADMIN — SODIUM CHLORIDE, POTASSIUM CHLORIDE, SODIUM LACTATE AND CALCIUM CHLORIDE: 600; 310; 30; 20 INJECTION, SOLUTION INTRAVENOUS at 11:46

## 2020-05-27 RX ADMIN — LIDOCAINE HYDROCHLORIDE 80 MG: 20 INJECTION, SOLUTION EPIDURAL; INFILTRATION; INTRACAUDAL; PERINEURAL at 12:50

## 2020-05-27 RX ADMIN — DEXAMETHASONE SODIUM PHOSPHATE 4 MG: 4 INJECTION, SOLUTION INTRAMUSCULAR; INTRAVENOUS at 12:56

## 2020-05-27 RX ADMIN — MIDAZOLAM HYDROCHLORIDE 1 MG: 1 INJECTION, SOLUTION INTRAMUSCULAR; INTRAVENOUS at 12:48

## 2020-05-27 RX ADMIN — MIDAZOLAM HYDROCHLORIDE 2 MG: 1 INJECTION, SOLUTION INTRAMUSCULAR; INTRAVENOUS at 12:44

## 2020-05-27 RX ADMIN — ACETAMINOPHEN 650 MG: 325 TABLET, FILM COATED ORAL at 12:02

## 2020-05-27 RX ADMIN — ONDANSETRON HYDROCHLORIDE 4 MG: 2 INJECTION, SOLUTION INTRAMUSCULAR; INTRAVENOUS at 12:56

## 2020-05-27 RX ADMIN — PROPOFOL 200 MG: 10 INJECTION, EMULSION INTRAVENOUS at 12:50

## 2020-05-27 RX ADMIN — FENTANYL CITRATE 25 MCG: 50 INJECTION, SOLUTION INTRAMUSCULAR; INTRAVENOUS at 12:50

## 2020-05-27 NOTE — OP NOTES
[address]  OPERATIVE REPORT    Name:  Hernan Shah  MR#:  [id number]  :  1995  ACCOUNT #:  [de-identified]  DATE OF SERVICE:  2020      PREOPERATIVE DIAGNOSIS:  Left wrist carpal tunnel syndrome. POSTOPERATIVE DIAGNOSIS:  Left wrist carpal tunnel syndrome. PROCEDURE PERFORMED:  Left wrist endoscopic carpal tunnel release. SURGEON:  Peter Garcia MD    ASSISTANT:  There were no assistants. ANESTHESIA:  General.    COMPLICATIONS:  No complications. SPECIMENS REMOVED:  There were no specimens. IMPLANTS:  There were no implants. ESTIMATED BLOOD LOSS:  Less than 5 mL. DRAINS:  There were no drains placed. TOURNIQUET TIME:  12 minutes at 250 mmHg in the left arm. INDICATIONS:  The patient is a 23-year-old right-hand-dominant woman with left wrist carpal tunnel syndrome and a failed conservative management and today elects to be taken to the operating room for surgical treatment. PROCEDURE:  The patient was identified, taken into the operating room, placed supine on the operating room table. She received intravenous sedation, then an LMA and general anesthesia by the Anesthesia Service. Her left upper extremity was prepped and draped sterilely. After Esmarch exsanguination, tourniquet in the left arm was inflated to 250 mmHg. A 1-cm transverse incision was made centrally in the proximal wrist flexion crease. With loupe magnification, a distal based antebrachial fascial flap was elevated gaining entrance into the carpal tunnel. The MicroAire instruments were utilized to remove tenosynovium from the undersurface of the transverse carpal ligament. Dilators were placed followed by the endoscope. There was excellent visualization from distal to proximal.  The transverse carpal ligament was released endoscopically staying ulnar in the carpal tunnel to avoid injury to the median nerve.   More proximally 2-3 cm, the distal antebrachial fascia was further released with tenotomy scissors to complete the decompression. The fascial flap was excised. The wound was irrigated with saline and injected with 10 mL of 0.5% Marcaine and 2% lidocaine in a 50/50 mixture prior to closure with 4-0 nylon suture. A soft conforming dressing was applied. The tourniquet released. The hand was pink and good refill. She was transported into the recovery room postoperatively in good condition.         Troy Maria MD      [id number]  D:  05/27/2020 13:23  T:  05/27/2020 17:18  JOB #:  [id number]

## 2020-05-27 NOTE — ANESTHESIA POSTPROCEDURE EVALUATION
Post-Anesthesia Evaluation and Assessment    Patient: Patrick Garrett MRN: [id number]  SSN: [id number]    YOB: 1995  Age: 31 y.o. Sex: female      I have evaluated the patient and they are stable and ready for discharge from the PACU. Cardiovascular Function/Vital Signs  Visit Vitals  BP (P) 117/77 (BP 1 Location: Right arm, BP Patient Position: At rest;Supine; Head of bed elevated (Comment degrees))   Pulse 65   Temp (P) 36.7 °C (98 °F)   Resp 18   Ht 5' 7\" (1.702 m)   Wt 86.2 kg (190 lb)   SpO2 99%   BMI 29.76 kg/m²       Patient is status post Other anesthesia for Procedure(s):  LEFT ENDOSCOPIC CARPAL TUNNEL RELEASE (BLOCK ANES). Nausea/Vomiting: None    Postoperative hydration reviewed and adequate. Pain:  Pain Scale 1: Visual(sleeping soundly) (05/27/20 1330)  Pain Intensity 1: 0 (05/27/20 1127)   Managed    Neurological Status:   Neuro (WDL): Exceptions to WDL(very drowsy) (05/27/20 1330)  Neuro  LUE Motor Response: Other(comment)(surgical site; some numbness, but able to wiggle fingers) (05/27/20 1330)  LLE Motor Response: Purposeful (05/27/20 1330)  RUE Motor Response: Purposeful (05/27/20 1330)  RLE Motor Response: Purposeful (05/27/20 1330)   At baseline    Mental Status, Level of Consciousness: Alert and  oriented to person, place, and time    Pulmonary Status:   O2 Device: (P) Nasal cannula (05/27/20 1400)   Adequate oxygenation and airway patent    Complications related to anesthesia: None    Post-anesthesia assessment completed. No concerns    Signed By: Socrates Castro MD     May 27, 2020              Procedure(s):  LEFT ENDOSCOPIC CARPAL TUNNEL RELEASE (BLOCK ANES). general    <BSHSIANPOST>    INITIAL Post-op Vital signs:   Vitals Value Taken Time   /77 5/27/2020  2:00 PM   Temp 36.4 °C (97.6 °F) 5/27/2020  1:30 PM   Pulse 65 5/27/2020  2:13 PM   Resp 19 5/27/2020  2:13 PM   SpO2 98 % 5/27/2020  2:13 PM   Vitals shown include unvalidated device data.

## 2020-05-27 NOTE — ANESTHESIA PREPROCEDURE EVALUATION
Relevant Problems   No relevant active problems       Anesthetic History   No history of anesthetic complications            Review of Systems / Medical History  Patient summary reviewed, nursing notes reviewed and pertinent labs reviewed    Pulmonary            Asthma : well controlled       Neuro/Psych   Within defined limits           Cardiovascular  Within defined limits                Exercise tolerance: >4 METS     GI/Hepatic/Renal  Within defined limits              Endo/Other  Within defined limits           Other Findings              Physical Exam    Airway  Mallampati: II  TM Distance: > 6 cm  Neck ROM: normal range of motion   Mouth opening: Normal     Cardiovascular  Regular rate and rhythm,  S1 and S2 normal,  no murmur, click, rub, or gallop             Dental  No notable dental hx       Pulmonary  Breath sounds clear to auscultation               Abdominal  GI exam deferred       Other Findings            Anesthetic Plan    ASA: 2  Anesthesia type: general          Induction: Intravenous  Anesthetic plan and risks discussed with: Patient

## 2020-05-27 NOTE — DISCHARGE INSTRUCTIONS
Patient Education        Carpal Tunnel Release: What to Expect at Home  Your Recovery  Your hand will hurt and may feel weak with some numbness. This usually goes away in a few days, but it may take several months. Your doctor may remove the large bandage, or he or she will tell you when and how to remove it yourself. In some cases, you may have a splint. If you have one, you will wear it for about 2 weeks. Your doctor will take out your stitches in 1 to 2 weeks. Your hand and wrist may feel worse than they had felt. But the pain should begin to go away. It usually takes 3 to 4 months to recover and up to 1 year before hand strength returns. How much hand strength returns will vary. The timing of your return to work depends on the type of surgery you had, whether the surgery was on your dominant hand (the hand you use most), and your work activities. If you had open surgery on your dominant hand and you do repeated actions at work, you may be able to return to work in 10 to 8 weeks. Repeated motions include typing or assembly-line work. If the surgery was on the other hand and you do not do repeated actions at work, you may be able to return to work in 9 to 14 days. If you had endoscopic surgery, you may be able to return to work sooner than with open surgery. This care sheet gives you a general idea about how long it will take for you to recover. But each person recovers at a different pace. Follow the steps below to get better as quickly as possible. How can you care for yourself at home? Activity    · Rest when you feel tired. Getting enough sleep will help you recover.     · Try to walk each day. Start by walking a little more than you did the day before. Bit by bit, increase the amount you walk.     · For up to 2 weeks after surgery, avoid lifting things heavier than 1 to 2 pounds and using your hand.  This includes doing repeated arm or hand movements, such as typing or using a computer mouse, washing windows, vacuuming, or chopping food. Do not use power tools, and avoid activities that cause vibration.     · You may begin heavier tasks about 4 weeks after surgery. These include vacuuming, mowing the lawn, and gardening.     · You may shower 24 to 48 hours after surgery, if your doctor okays it. Keep your bandage dry by taping a sheet of plastic to cover it. If you have a splint, keep it dry. Your doctor will tell you whether you can remove it when you shower. Be careful not to put the splint on too tight. Do not take a bath until the incision heals, or until your doctor tells you it is okay.     · You may drive when you are fully able to use your hand. Diet    · You can eat your normal diet. If your stomach is upset, try bland, low-fat foods like plain rice, broiled chicken, toast, and yogurt. Medicines    · Your doctor will tell you if and when you can restart your medicines. He or she will also give you instructions about taking any new medicines.     · If you take aspirin or some other blood thinner, ask your doctor if and when to start taking it again. Make sure that you understand exactly what your doctor wants you to do.     · Take pain medicines exactly as directed. ? If the doctor gave you a prescription medicine for pain, take it as prescribed. ? If you are not taking a prescription pain medicine, take an over-the-counter medicine such as acetaminophen (Tylenol), ibuprofen (Advil, Motrin), or naproxen (Aleve). Read and follow all instructions on the label. ? Do not take two or more pain medicines at the same time unless the doctor told you to. Many pain medicines have acetaminophen, which is Tylenol. Too much acetaminophen (Tylenol) can be harmful.     · If you think your pain medicine is making you sick to your stomach:  ? Take your medicine after meals (unless your doctor has told you not to). ?  Ask your doctor for a different pain medicine.     · If your doctor prescribed antibiotics, take them as directed. Do not stop taking them just because you feel better. You need to take the full course of antibiotics.    Incision and splint care    · Keep your bandage dry. If it gets dirty, you may change it.     · If you have a splint, talk to your doctor about when you should wear it. Exercise    · You may need wrist and hand rehabilitation. This is a series of exercises you do after your surgery. This helps you get back your wrist's and hand's range of motion, strength, and . You will work with your doctor and physical or occupational therapist to plan this exercise program. To get the best results, you need to do the exercises correctly and as often and as long as your doctor tells you. Ice and elevation    · Put ice or a cold pack on your wrist for 10 to 20 minutes at a time. Try to do this every 1 to 2 hours for the next 3 days (when you are awake) or until the swelling goes down. Put a thin cloth between the ice and your skin.     · Prop up the sore wrist on a pillow when you ice it or anytime you sit or lie down during the next 3 days. Try to keep it above the level of your heart. This will help reduce swelling. Other instructions    · Avoid letting your hand hang down. This can cause swelling. Follow-up care is a key part of your treatment and safety. Be sure to make and go to all appointments, and call your doctor if you are having problems. It's also a good idea to know your test results and keep a list of the medicines you take. When should you call for help? Call 911 anytime you think you may need emergency care.  For example, call if:    · You passed out (lost consciousness).     · You have chest pain, are short of breath, or cough up blood.    Call your doctor now or seek immediate medical care if:    · You have pain that does not get better after you take pain medicine.     · Your hand is cool or pale or changes color.     · Your cast or splint feels too tight.     · You have tingling, weakness, or numbness in your hand or fingers.     · You are sick to your stomach or cannot drink fluids.     · You have loose stitches, or your incision comes open.     · You have signs of a blood clot in your leg (called a deep vein thrombosis), such as:  ? Pain in your calf, back of the knee, thigh, or groin. ? Redness or swelling in your leg.     · You have signs of infection, such as:  ? Increased pain, swelling, warmth, or redness. ? Red streaks leading from the incision. ? Pus draining from the incision. ? A fever.     · Bright red blood has soaked through the bandage over your incision.    Watch closely for any changes in your health, and be sure to contact your doctor if:    · You have a problem with your cast or splint.     · You do not get better as expected. Where can you learn more? Go to http://kip-candelario.info/  Enter N388 in the search box to learn more about \"Carpal Tunnel Release: What to Expect at Home. \"  Current as of: June 26, 2019Content Version: 12.4  © 5016-6106 [employer]. Care instructions adapted under license by [employer] (which disclaims liability or warranty for this information). If you have questions about a medical condition or this instruction, always ask your healthcare professional. [place] [address] any warranty or liability for your use of this information. Dr. Claude Lopez Instructions for Elbow/Wrist/Hand Surgery    Medications/Diet  1. Eat only light, non-greasy foods today  2. Take pain medicine with food  3. While taking pain medicines, you may not operate a vehicle, heavy machinery, or appliances  4. While taking pain medicines, you may not drink alcoholic beverages  5. While taking pain medicines, you may not make critical decisions or sign legal papers  6. If you have any reactions to your medicines, stop taking them and call my office immediately  7.  Please keep in mind that constipation is a very common side   effect of taking narcotic pain medication. We recommend that patients take precautions to prevent constipation:   Drink plenty of water (6-8 glasses of 8 oz. a day)   Avoid alcohol, caffeine, and dairy products   Eat plenty of fiber (fruits, vegetables and whole grains)   Take an over the counter stool softener (colace or dulcolax)   Patients that have had upper extremity surgery should take frequent walks      Activity/Exercise    1. Exercises are not necessary at this stage, and you will be given exercises at your first post operative visit  2. You are in a splint and should remain in this until your first postoperative visit  3. Please keep ice applied to the wrist for the first 72 hours or as long as pain or swelling persist. Do not apply ice directly to skin, or allow water to leak on your dressing    Dressings/Shower    1. Please keep dressing dry  2. If you have had arthroscopy, you may expect some drainage  3. Please reinforce your dressing with a dry sterile dressing  4. Your dressing will be removed at your first post operative visit  5. You may not shower until after your first post operative visit    Emergency/Follow-Up    1. Please notify my office at [phone] if you develop any fever (101° or above), unexpected warmth, redness or swelling in your hand. Please call if your fingers become cold, purple, numb, or there is excessive bleeding  2. Please call the office within 24 hours at [phone] (ext. [phone]) to schedule a follow up appointment next week  Please call the office before 3pm on Friday if you do not have enough pain medicine for the weekend. Narcotic pain medication cannot be called into your pharmacy and the prescription must be picked up at our office. Patient Education   Learning About Coronavirus [phone])  Coronavirus [phone]): Overview  What is coronavirus ([id number])? The coronavirus disease (COVID-19) is caused by a virus.  It is an illness that was first found in [place], [place], in December 2019. It has since spread worldwide. The virus can cause fever, cough, and trouble breathing. In severe cases, it can cause pneumonia and make it hard to breathe without help. It can cause death. Coronaviruses are a large group of viruses. They cause the common cold. They also cause more serious illnesses like Middle East respiratory syndrome (MERS) and severe acute respiratory syndrome (SARS). COVID-19 is caused by a novel coronavirus. That means it's a new type that has not been seen in people before. This virus spreads person-to-person through droplets from coughing and sneezing. It can also spread when you are close to someone who is infected. And it can spread when you touch something that has the virus on it, such as a doorknob or a tabletop. What can you do to protect yourself from coronavirus (COVID-19)? The best way to protect yourself from getting sick is to:  · Avoid areas where there is an outbreak. · Avoid contact with people who may be infected. · Wash your hands often with soap or alcohol-based hand sanitizers. · Avoid crowds and try to stay at least 6 feet away from other people. · Wash your hands often, especially after you cough or sneeze. Use soap and water, and scrub for at least 20 seconds. If soap and water aren't available, use an alcohol-based hand . · Avoid touching your mouth, nose, and eyes. What can you do to avoid spreading the virus to others? To help avoid spreading the virus to others:  · Cover your mouth with a tissue when you cough or sneeze. Then throw the tissue in the trash. · Use a disinfectant to clean things that you touch often. · Stay home if you are sick or have been exposed to the virus. Don't go to school, work, or public areas. And don't use public transportation. · If you are sick:  ? Leave your home only if you need to get medical care.  But call the doctor's office first so they know you're coming. And wear a face mask, if you have one.  ? If you have a face mask, wear it whenever you're around other people. It can help stop the spread of the virus when you cough or sneeze. ? Clean and disinfect your home every day. Use household  and disinfectant wipes or sprays. Take special care to clean things that you grab with your hands. These include doorknobs, remote controls, phones, and handles on your refrigerator and microwave. And don't forget countertops, tabletops, bathrooms, and computer keyboards. When to call for help  Call 911 anytime you think you may need emergency care. For example, call if:  · You have severe trouble breathing. (You can't talk at all.)  · You have constant chest pain or pressure. · You are severely dizzy or lightheaded. · You are confused or can't think clearly. · Your face and lips have a blue color. · You pass out (lose consciousness) or are very hard to wake up. Call your doctor now if you develop symptoms such as:  · Shortness of breath. · Fever. · Cough. If you need to get care, call ahead to the doctor's office for instructions before you go. Make sure you wear a face mask, if you have one, to prevent exposing other people to the virus. Where can you get the latest information? The following health organizations are tracking and studying this virus. Their websites contain the most up-to-date information. [place] also learn what to do if you think you may have been exposed to the virus. · U.S. Centers for Disease Control and Prevention (CDC): The CDC provides updated news about the disease and travel advice. The website also tells you how to prevent the spread of infection. www.cdc.gov  · World Health Organization [facility]): WHO offers information about the virus outbreaks. WHO also has travel advice. www.who.int  Current as of: April 1, 2020               Content Version: 12.4  © 4670-3731 [employer].    Care instructions adapted under license by your healthcare professional. If you have questions about a medical condition or this instruction, always ask your healthcare professional. [place] [address] any warranty or liability for your use of this information.

## 2020-05-27 NOTE — H&P
History and Physical    Subjective:   CC:  LEFT CARPAL TUNNEL SYNDROME, HAND PAIN LEFT    HPI:  Sarah Desai is a 31y.o. year old female who presents with LEFT CARPAL TUNNEL SYNDROME, HAND PAIN LEFT for left wrist endoscopic carpal tunnel release today. Past Medical History:   Past Medical History:   Diagnosis Date    Anemia NEC     Asthma     Herpes 3/2013    Migraine     Migraine     Other ill-defined conditions(799.89)     Left breast mass    Reactive airway disease     exercise induced      Past Surgical History:   Past Surgical History:   Procedure Laterality Date    BREAST SURGERY PROCEDURE UNLISTED      BENIGN MASSES REMOVED-LEFT      Family History:   Family History   Problem Relation Age of Onset    Hypertension Mother     Other Mother         GERD    Hypertension Father    [address] Arthritis-osteo Father     Asthma Sister     Other Sister         idiopathic allergic reaction    Asthma Sister     Diabetes Maternal Grandfather     Heart Disease Maternal Grandfather         CHF    Diabetes Paternal Grandfather     Cancer Neg Hx     Stroke Neg Hx       Social History:   Social History     Tobacco Use    Smoking status: Never Smoker    Smokeless tobacco: Never Used   Substance Use Topics    Alcohol use: Yes     Comment: OCCASIONAL       Home Medications:   Prior to Admission medications    Medication Sig Start Date End Date Taking? Authorizing Provider   linaCLOtide (Linzess) 145 mcg cap capsule Take 1 Cap by mouth Daily (before breakfast). Take 30 minutes prior to first meal of the day. 4/3/20  Yes Lewis Hutchins MD   norethindrone-e.estradiol-iron (MINASTRIN 24 FE) 1 mg-20 mcg(24) /75 mg (4) chew Minastrin 24 Fe 1 mg-20 mcg (24)/75 mg (4) chewable tablet   Chew 1 pill at the same time daily  GRP# [id number]   BIN# [id number] ID# [id number]      PCN#  CN   Yes Provider, Historical   albuterol (PROVENTIL HFA, VENTOLIN HFA, PROAIR HFA) 90 mcg/actuation inhaler Take  by inhalation.     Provider, Historical     Allergies: No Known Allergies     Review of Systems:  A comprehensive review of systems was negative except for that written in the History of Present Illness. Objective:         Physical Exam:     Vitals: Blood pressure 121/75, pulse 72, temperature 98.9 °F (37.2 °C), resp. rate 18, height 5' 7\" (1.702 m), weight 86.2 kg (190 lb), last menstrual period 05/02/2020, SpO2 98 %. General:  Awake and alert  HEENT:  NCAT, neck supple without lymphadenopathy  Lungs:  CTA bilaterally  CV:  RRR  Abd:  Soft, non-tender, non-distended  Ext's: Tender left carpal tunnel with positive Tinel's  Neuro:  A&O x 3      Data Review:   Recent Results (from the past 24 hour(s))   HCG URINE, QL. - POC    Collection Time: 05/27/20 12:29 PM   Result Value Ref Range    Pregnancy test,urine (POC) Negative NEG         Assessment:     Principal Problem:    Left carpal tunnel syndrome (5/27/2020)        Plan: To OR for left wrist endoscopic carpal tunnel release today.     Signed By: Tonia Kim MD     May 27, 2020

## 2020-05-27 NOTE — BRIEF OP NOTE
Brief Postoperative Note    Patient: Nando West  YOB: 1995  MRN: [id number]    Date of Procedure: 5/27/2020     Pre-Op Diagnosis: LEFT CARPAL TUNNEL SYNDROME, HAND PAIN LEFT    Post-Op Diagnosis: Same as preoperative diagnosis.       Procedure(s):  LEFT ENDOSCOPIC CARPAL TUNNEL RELEASE     Surgeon(s):  Eduarda Malagon MD    Surgical Assistant: None    Anesthesia: General    Estimated Blood Loss (mL): Minimal    Complications: None    Specimens: * No specimens in log *     Implants: * No implants in log *    Drains: * No LDAs found *    Findings: above    Electronically Signed by Hue Lyman MD on 5/27/2020 at 1:19 PM

## 2020-06-02 ENCOUNTER — VIRTUAL VISIT (OUTPATIENT)
Dept: INTERNAL MEDICINE CLINIC | Age: 25
End: 2020-06-02

## 2020-06-02 DIAGNOSIS — Z09 HOSPITAL DISCHARGE FOLLOW-UP: Primary | ICD-10-CM

## 2020-06-02 DIAGNOSIS — G56.01 CARPAL TUNNEL SYNDROME OF RIGHT WRIST: ICD-10-CM

## 2020-06-02 DIAGNOSIS — J45.990 ASTHMA, EXERCISE INDUCED: ICD-10-CM

## 2020-06-02 NOTE — PROGRESS NOTES
Zaid Martinez is a 31 y.o. female who was seen by synchronous (real-time) audio-video technology on 6/2/2020. Consent: Kiran Cortes who was seen by synchronous (real-time) audio-video technology, and/or her healthcare decision maker, is aware that this patient-initiated, Telehealth encounter on 6/2/2020 is a billable service, with coverage as determined by her insurance carrier. She is aware that she may receive a bill and has provided verbal consent to proceed: Yes. Patient identification was verified prior to start of the visit. A caregiver was present when appropriate. Due to this being a TeleHealth encounter (during [address] emergency), evaluation of the following organ systems was limited: VS/Constituional/EENT/Resp/CV/GI//MS/Neuro/Skin/Heme-Lymph-Imm. Pursuant to the emergency declaration under the [address] [place], [place] [address] waiver authority and the [employer] Act, this Virtual Visit was conducted, with patient's (and/or legal guardian's) consent, to reduce the patient's risk of exposure to COVID-19 and provide necessary medical care. Services were provided through a synchronous discussion virtually to substitute for in-person clinic visit. I was in the office. The patient was at home. Assessment & Plan:     Diagnoses and all orders for this visit:    1. Asthma, exercise induced    2. Carpal tunnel syndrome of right wrist    3. Hospital discharge follow-up      Follow-up and Dispositions    · Return in about 18 weeks (around 10/6/2020) for Dr. Marilyn Clark for physical.       Subjective:   Zaid Martinez was seen for Surgical Follow-up    32 yo female seen for RANI SAMAYOA after R CTR by Dr Allyson Wills on 5/27. She is doing well, taking only Tylenol for pain, and witll have sutures removed tomorrow. 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      She reports no problems during or after surgery. PE: WNWD BF appears healthy and in NAD    Imp: [facility]ESS f/u after RCTR 6 days ago    Plan: She will see Dr. Frank Nino for Health Maintenance/Physical 10/6/2020      Prior to Admission medications    Medication Sig Start Date End Date Taking? Authorizing Provider   albuterol (PROVENTIL HFA, VENTOLIN HFA, PROAIR HFA) 90 mcg/actuation inhaler Take  by inhalation. Yes Provider, Historical   HYDROcodone-acetaminophen (NORCO) 5-325 mg per tablet Take 1 Tab by mouth every six (6) hours as needed for Pain for up to 7 days. Max Daily Amount: 4 Tabs. 5/27/20 6/3/20 Yes Drake Dailey MD   linaCLOtide (Linzess) 145 mcg cap capsule Take 1 Cap by mouth Daily (before breakfast). Take 30 minutes prior to first meal of the day. 4/3/20  Yes William Ge MD   norethindrone-e.estradiol-iron (MINASTRIN 24 FE) 1 mg-20 mcg(24) /75 mg (4) chew Minastrin 24 Fe 1 mg-20 mcg (24)/75 mg (4) chewable tablet   Chew 1 pill at the same time daily  GRP# [id number]   BIN# [id number] ID# [id number]      PCN#  CN   Yes Provider, Historical       No Known Allergies    ROS - per HPI      Objective:     General: alert, cooperative, no distress   Mental  status: normal mood, behavior, speech, dress, motor activity, and thought processes   Eyes:    Mouth:    Neck:    Resp: normal effort and no respiratory distress   Neuro:    Musculoskeletal:    Skin:    Psychiatric: normal affect         We discussed the expected course, resolution and complications of the diagnosis(es) in detail. Medication risks, benefits, costs, interactions, and alternatives were discussed as indicated. I advised her to contact the office if her condition worsens, changes or fails to improve as anticipated. She expressed understanding with the diagnosis(es) and plan.      Shanna Cardoso NP

## 2020-09-25 ENCOUNTER — EMPLOYEE WELLNESS (OUTPATIENT)
Dept: OTHER | Facility: CLINIC | Age: 25
End: 2020-09-25

## 2020-09-26 LAB
CHOLEST SERPL-MCNC: 133 MG/DL
GLUCOSE SERPL-MCNC: 84 MG/DL (ref 65–100)
HDLC SERPL-MCNC: 66 MG/DL
LDLC SERPL CALC-MCNC: 56.6 MG/DL (ref 0–100)
TRIGL SERPL-MCNC: 52 MG/DL (ref ?–150)

## 2020-11-16 ENCOUNTER — TELEPHONE (OUTPATIENT)
Dept: INTERNAL MEDICINE CLINIC | Age: 25
End: 2020-11-16

## 2020-11-16 DIAGNOSIS — Z76.89 ENCOUNTER FOR WEIGHT MANAGEMENT: Primary | ICD-10-CM

## 2020-11-16 NOTE — TELEPHONE ENCOUNTER
Patient called requesting referral/letter stating why patient Dr. Chuy Sorto is referring patient be sent to Dr. Cassia Daniel. Dr. Edy Carnes will not schedule until referral has been received. Verified no insurance referral is required. Follow up once referral has been faxed via [phone]     Fax: [phone]

## 2020-11-16 NOTE — TELEPHONE ENCOUNTER
Referral created to Dr. Pratik Cardenas for weight management.     Orders Placed This Encounter    REFERRAL TO WEIGHT LOSS     Referral Priority:   Routine     Referral Type:   Consultation     Referral Reason:   Specialty Services Required     Referred to Provider:   Eric Winter MD     Requested Specialty:   Family Medicine     Number of Visits Requested:   1

## 2020-11-23 ENCOUNTER — VIRTUAL VISIT (OUTPATIENT)
Dept: FAMILY MEDICINE CLINIC | Age: 25
End: 2020-11-23
Payer: COMMERCIAL

## 2020-11-23 DIAGNOSIS — E66.3 OVERWEIGHT (BMI 25.0-29.9): ICD-10-CM

## 2020-11-23 DIAGNOSIS — J45.990 ASTHMA, EXERCISE INDUCED: Primary | ICD-10-CM

## 2020-11-23 PROCEDURE — 99214 OFFICE O/P EST MOD 30 MIN: CPT | Performed by: FAMILY MEDICINE

## 2020-11-23 NOTE — PROGRESS NOTES
Sandra Leach is a 31 y.o. female who was seen by synchronous (real-time) audio-video technology on 11/23/2020. Consent:  She and/or her healthcare decision maker is aware that this patient-initiated Telehealth encounter is a billable service, with coverage as determined by her insurance carrier. She is aware that she may receive a bill and has provided verbal consent to proceed: Yes    I was at home while conducting this encounter. Weight Loss Progress Note: Initial Physician Visit      Sandra Leach is a 31 y.o. female with BMI   29   who is here for her Initial Evaluation for the medical bariatric care. CC:  I want to be healthier          Weight History  Current weight  190and Ht 5'7\" waist 38\", based on waist she has significant risk and adiposity  Goal weight BMI 24  Highest weight 190  (See weight gain time line scanned into media section of chart)            Weight loss History  How many weight loss attempts have you had? 1  Which program were you most successful doing? Medi weight loss  She has tried mediweightloss lost 20 lbs, when the job stopped covering she had to stop   she has tried calorie deficit and that was not very successfuls  She has lost 8 lbs by que deficit 1400 cals  She does not snack but over eats portions  She eats potatoes and pasta  Eats breads 2-3 times per week      Significant Medical History    Have you ever taken appetite suppressants? yes   If yes: Rx or OTC? phentermine   If yes;  Any negative side effects?none, for 3 months then tried by herself    Ever diagnosed with sleep apnea or put on CPAP 6-7 on a good day, refreshed by sleep, she does not wake with headaches, not snoring    Ever had bariatric surgery: none    Pregnant or planning on becoming pregnant w/in 6 months: no        Significant Psychosocial History   Any history of drug abuse or dependence: no  Current Major Lifestyle Changes: no  Any potential unsupportive people: no      History of binge eating disorder or anorexia : no   If yes, are you currently being treated no    Social History  Social History     Tobacco Use    Smoking status: Never Smoker    Smokeless tobacco: Never Used   Substance Use Topics    Alcohol use: Yes     Comment: OCCASIONAL     How many times a week do you eat out?  2-3    Do you drink any EtOH?  no   If so, how much? Only socially 2 times per week    Do you have upcoming any travel in the next 6 weeks?  no   If so, what do you have planned? Exercise  How many days a week do you currently exercise? 3days 45-60 min, combo cardio and resistance  Resistance: machines at gym  Have you ever been told by a physician not to exercise?  no            Labs:  LDL 56, trig 52    Review of Systems  Complete ROS negative except where noted above    712    ROS    PHYSICAL EXAMINATION:  [ INSTRUCTIONS:  \"[x]\" Indicates a positive item  \"[]\" Indicates a negative item  -- DELETE ALL ITEMS NOT EXAMINED]  Vital Signs: (As obtained by patient/caregiver at home)  There were no vitals taken for this visit.      Constitutional: [x] Appears well-developed and well-nourished [x] No apparent distress      [] Abnormal -     Mental status: [x] Alert and awake  [x] Oriented to person/place/time [x] Able to follow commands    [] Abnormal -     Eyes:   EOM    [x]  Normal    [] Abnormal -   Sclera  [x]  Normal    [] Abnormal -          Discharge [x]  None visible   [] Abnormal -     HENT: [x] Normocephalic, atraumatic  [] Abnormal -   [x] Mouth/Throat: Mucous membranes are moist    External Ears [x] Normal  [] Abnormal -  Skin tags - no   Acanthosis Nigricans - no      Neck: [x] No visualized mass [] Abnormal -     Pulmonary/Chest: [x] Respiratory effort normal   [x] No visualized signs of difficulty breathing or respiratory distress        [] Abnormal -      Musculoskeletal:   [x] Normal gait with no signs of ataxia         [x] Normal range of motion of neck        [] Abnormal -     Neurological: [x] No Facial Asymmetry (Cranial nerve 7 motor function) (limited exam due to video visit)          [x] No gaze palsy        [] Abnormal -          Skin:        [x] No significant exanthematous lesions or discoloration noted on facial skin         [] Abnormal -            Psychiatric:       [x] Normal Affect [] Abnormal -        [x] No Hallucinations    Assessment & Plan:   Diagnoses and all orders for this visit:    1. Asthma, exercise induced  stable  2. Overweight (BMI 25.0-29. 9)    Diet Plan:given 1200 LCD low carb plan      Activity Plan: need to increase,  aim for 60 min 5 days a week       Medication Plan  None yet      Patient Instructions   LOW JOSE DIET     Drink 64 ounces of water each day  Exercise at least 300 mins a week    Breakfast  Either a premier protein meal replacement*( 30 g of protein) or 2 boiled eggs  And 1 piece of fruit( apple, orange, banana, grapefruit or pear)      Lunch  Either a premier protein drink* (30 G ) or 3 ounces of lean meat and 2 servings of any leafy green vegetables. Can also have as 1 option for vegetables maldonado beans or green peas. AND 1 piece of fruit as listed above    Snack: premier protein drink*    Dinner  4 ounces of lean meat with 2 servings of vegetables ( as listed above)  Also may have a small-medium baked sweet potato    For tea or coffee use stevia sweetener            *if lactose intolerant use Ramirez protein powder or premixed drink as the meal replacement                  Based on his history and exam, Kylee Cortes is a good candidate for the [facility] Weight Loss Program          The primary encounter diagnosis was Asthma, exercise induced. A diagnosis of Overweight (BMI 25.0-29.9) was also pertinent to this visit. Other pertinent observable physical exam findings:-    Coding Help - Use CPT Codes 02502-71957, 15493-05168 for Established and New Patients respectively, either employing EM elements or Time rules.  Other codes (example consult codes) may also apply. We discussed the expected course, resolution and complications of the diagnosis(es) in detail. Medication risks, benefits, costs, interactions, and alternatives were discussed as indicated. I advised her to contact the office if her condition worsens, changes or fails to improve as anticipated. She expressed understanding with the diagnosis(es) and plan. Pursuant to the emergency declaration under the [address] [place] [address], [facility] waiver authority and the [employer] Act, this Virtual  Visit was conducted, with patient's consent, to reduce the patient's risk of exposure to COVID-19 and provide continuity of care for an established patient. Services were provided through a video synchronous discussion virtually to substitute for in-person clinic visit.     Burke Ty MD

## 2020-11-23 NOTE — PATIENT INSTRUCTIONS
LOW JOSE DIET Drink 64 ounces of water each day Exercise at least 300 mins a week Breakfast 
Either a premier protein meal replacement*( 30 g of protein) or 2 boiled eggs And 1 piece of fruit( apple, orange, banana, grapefruit or pear) Lunch Either a premier protein drink* (30 G ) or 3 ounces of lean meat and 2 servings of any leafy green vegetables. Can also have as 1 option for vegetables maldonado beans or green peas. AND 1 piece of fruit as listed above Snack: premier protein drink* Isabelle Rojas 4 ounces of lean meat with 2 servings of vegetables ( as listed above) Also may have a small-medium baked sweet potato For tea or coffee use stevia sweetener *if lactose intolerant use Ramirez protein powder or premixed drink as the meal replacement

## 2020-11-23 NOTE — PROGRESS NOTES
1. Have you been to the ER, urgent care clinic since your last visit? Hospitalized since your last visit? No    2. Have you seen or consulted any other health care providers outside of the [address] [place] [address] since your last visit? Include any pap smears or colon screening. No     Chief Complaint   Patient presents with    Weight Management     Patient-Reported Vitals 11/23/2020   Patient-Reported Weight 190lb      3 most recent PHQ Screens 11/23/2020   Little interest or pleasure in doing things Not at all   Feeling down, depressed, irritable, or hopeless Not at all   Total Score PHQ 2 0     Pharmacy verified.    [place] [id number]

## 2020-12-02 ENCOUNTER — OFFICE VISIT (OUTPATIENT)
Dept: INTERNAL MEDICINE CLINIC | Age: 25
End: 2020-12-02
Payer: COMMERCIAL

## 2020-12-02 VITALS
DIASTOLIC BLOOD PRESSURE: 75 MMHG | HEIGHT: 67 IN | BODY MASS INDEX: 30.73 KG/M2 | RESPIRATION RATE: 16 BRPM | TEMPERATURE: 97.3 F | HEART RATE: 68 BPM | WEIGHT: 195.8 LBS | OXYGEN SATURATION: 98 % | SYSTOLIC BLOOD PRESSURE: 117 MMHG

## 2020-12-02 DIAGNOSIS — K58.1 IRRITABLE BOWEL SYNDROME WITH CONSTIPATION: ICD-10-CM

## 2020-12-02 DIAGNOSIS — Z00.00 ANNUAL PHYSICAL EXAM: Primary | ICD-10-CM

## 2020-12-02 LAB
ALBUMIN SERPL-MCNC: 4 G/DL (ref 3.5–5)
ALBUMIN/GLOB SERPL: 1.3 {RATIO} (ref 1.1–2.2)
ALP SERPL-CCNC: 51 U/L (ref 45–117)
ALT SERPL-CCNC: 26 U/L (ref 12–78)
ANION GAP SERPL CALC-SCNC: 6 MMOL/L (ref 5–15)
AST SERPL-CCNC: 15 U/L (ref 15–37)
BASOPHILS # BLD: 0 K/UL (ref 0–0.1)
BASOPHILS NFR BLD: 0 % (ref 0–1)
BILIRUB SERPL-MCNC: 0.3 MG/DL (ref 0.2–1)
BUN SERPL-MCNC: 15 MG/DL (ref 6–20)
BUN/CREAT SERPL: 22 (ref 12–20)
CALCIUM SERPL-MCNC: 8.7 MG/DL (ref 8.5–10.1)
CHLORIDE SERPL-SCNC: 108 MMOL/L (ref 97–108)
CO2 SERPL-SCNC: 28 MMOL/L (ref 21–32)
CREAT SERPL-MCNC: 0.68 MG/DL (ref 0.55–1.02)
DIFFERENTIAL METHOD BLD: NORMAL
EOSINOPHIL # BLD: 0.1 K/UL (ref 0–0.4)
EOSINOPHIL NFR BLD: 1 % (ref 0–7)
ERYTHROCYTE [DISTWIDTH] IN BLOOD BY AUTOMATED COUNT: 13.2 % (ref 11.5–14.5)
EST. AVERAGE GLUCOSE BLD GHB EST-MCNC: 108 MG/DL
GLOBULIN SER CALC-MCNC: 3.1 G/DL (ref 2–4)
GLUCOSE SERPL-MCNC: 84 MG/DL (ref 65–100)
HBA1C MFR BLD: 5.4 % (ref 4–5.6)
HCT VFR BLD AUTO: 43.3 % (ref 35–47)
HGB BLD-MCNC: 13.6 G/DL (ref 11.5–16)
IMM GRANULOCYTES # BLD AUTO: 0 K/UL (ref 0–0.04)
IMM GRANULOCYTES NFR BLD AUTO: 0 % (ref 0–0.5)
LYMPHOCYTES # BLD: 1.9 K/UL (ref 0.8–3.5)
LYMPHOCYTES NFR BLD: 43 % (ref 12–49)
MCH RBC QN AUTO: 27.3 PG (ref 26–34)
MCHC RBC AUTO-ENTMCNC: 31.4 G/DL (ref 30–36.5)
MCV RBC AUTO: 86.9 FL (ref 80–99)
MONOCYTES # BLD: 0.4 K/UL (ref 0–1)
MONOCYTES NFR BLD: 10 % (ref 5–13)
NEUTS SEG # BLD: 2 K/UL (ref 1.8–8)
NEUTS SEG NFR BLD: 46 % (ref 32–75)
NRBC # BLD: 0 K/UL (ref 0–0.01)
NRBC BLD-RTO: 0 PER 100 WBC
PLATELET # BLD AUTO: 280 K/UL (ref 150–400)
PMV BLD AUTO: 10.2 FL (ref 8.9–12.9)
POTASSIUM SERPL-SCNC: 4.2 MMOL/L (ref 3.5–5.1)
PROT SERPL-MCNC: 7.1 G/DL (ref 6.4–8.2)
RBC # BLD AUTO: 4.98 M/UL (ref 3.8–5.2)
SODIUM SERPL-SCNC: 142 MMOL/L (ref 136–145)
WBC # BLD AUTO: 4.3 K/UL (ref 3.6–11)

## 2020-12-02 PROCEDURE — 99395 PREV VISIT EST AGE 18-39: CPT | Performed by: INTERNAL MEDICINE

## 2020-12-02 NOTE — PROGRESS NOTES
Subjective:      Fawad Chilel is a 31 y.o. female who presents today for her annual physical and follow up of her ibs. Gyn care is provided by [facility]. - last visit was 3/4/20. Dr. Nikki Suárez is working well for her ibs. She was referred to weight management in 11/2020. She has started to work with Dr. Collette Mechanic. She had carpal tunnel release in 5/2020 with Dr. Shon Lamb. 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  linaCLOtide (Linzess) 145 mcg cap capsule Take 1 Cap by mouth Daily (before breakfast). Take 30 minutes prior to first meal of the day. 90 Cap 1    norethindrone-e.estradiol-iron (MINASTRIN 24 FE) 1 mg-20 mcg(24) /75 mg (4) chew Minastrin 24 Fe 1 mg-20 mcg (24)/75 mg (4) chewable tablet   Chew 1 pill at the same time daily  [facility]# [id number]   BIN# [id number] ID# [id number]      PCN#  CN          Review of Systems    A comprehensive review of systems was negative except for that written in the HPI. Objective:      Wt Readings from Last 3 Encounters:   12/02/20 195 lb 12.8 oz (88.8 kg)   05/27/20 190 lb (86.2 kg)   08/27/19 199 lb (90.3 kg)     BP Readings from Last 3 Encounters:   12/02/20 117/75   05/27/20 120/76   08/27/19 130/60     Visit Vitals  /75   Pulse 68   Temp 97.3 °F (36.3 °C) (Temporal)   Resp 16   Ht 5' 7\" (1.702 m)   Wt 195 lb 12.8 oz (88.8 kg)   LMP 11/18/2020   SpO2 98%   BMI 30.67 kg/m²     General appearance: alert, cooperative, no distress, appears stated age  Head: Normocephalic, without obvious abnormality, atraumatic  Eyes: negative findings: anicteric sclera, lids and lashes normal, conjunctivae and sclerae normal, corneas clear and pupils equal, round, reactive to light and accomodation  Ears: normal TM's and external ear canals AU  Neck: supple, symmetrical, trachea midline, no adenopathy, no carotid bruit and no JVD  Lungs: clear to auscultation bilaterally  Breasts: normal appearance, no masses or tenderness  Heart: regular rate and rhythm, S1, S2 normal, no murmur, click, rub or gallop  Abdomen: soft, non-tender. Bowel sounds normal. No masses,  no organomegaly  Extremities: extremities normal, atraumatic, no cyanosis or edema  Pulses: 2+ and symmetric  Neurologic: Alert and oriented X 3, normal strength and tone. . Normal coordination and gait    Assessment/Plan:     1. Annual physical exam  -fasting lipid panel done in 9/2020 for wellness program.  Results reviewed. -ordered other yearly labs today. - CBC WITH AUTOMATED DIFF; Future  - METABOLIC PANEL, COMPREHENSIVE; Future  - HEMOGLOBIN A1C WITH EAG; Future    2. Irritable bowel syndrome with constipation  -continue use linzess. 3.  Weight management  -continue per Dr. Favian Breaux     Follow-up Disposition:     Follow up yearly     Return if symptoms worsen or fail to improve. Advised patient to call back or return to office if symptoms worsen/change/persist.     Discussed expected course/resolution/complications of diagnosis in detail with patient. Medication risks/benefits/costs/interactions/alternatives discussed with patient. Patient was given an after visit summary which includes diagnoses, current medications, & vitals. Patient expressed understanding with the diagnosis and plan.

## 2020-12-14 ENCOUNTER — VIRTUAL VISIT (OUTPATIENT)
Dept: FAMILY MEDICINE CLINIC | Age: 25
End: 2020-12-14
Payer: COMMERCIAL

## 2020-12-14 DIAGNOSIS — E66.9 OBESITY (BMI 30-39.9): ICD-10-CM

## 2020-12-14 DIAGNOSIS — G47.00 INSOMNIA, UNSPECIFIED TYPE: Primary | ICD-10-CM

## 2020-12-14 PROCEDURE — 99213 OFFICE O/P EST LOW 20 MIN: CPT | Performed by: FAMILY MEDICINE

## 2020-12-14 RX ORDER — BUPROPION HYDROCHLORIDE 75 MG/1
75 TABLET ORAL 2 TIMES DAILY
Qty: 60 TAB | Refills: 2 | Status: SHIPPED | OUTPATIENT
Start: 2020-12-14 | End: 2021-01-13

## 2020-12-14 NOTE — PROGRESS NOTES
Patient stated name &   Chief Complaint   Patient presents with    Follow-up     3 week followup        There are no preventive care reminders to display for this patient. Wt Readings from Last 3 Encounters:   20 195 lb 12.8 oz (88.8 kg)   20 190 lb (86.2 kg)   19 199 lb (90.3 kg)     Temp Readings from Last 3 Encounters:   20 97.3 °F (36.3 °C) (Temporal)   20 98 °F (36.7 °C)   19 98.4 °F (36.9 °C) (Oral)     BP Readings from Last 3 Encounters:   20 117/75   20 120/76   19 130/60     Pulse Readings from Last 3 Encounters:   20 68   20 71   19 76         Learning Assessment:  :     Learning Assessment 2018   PRIMARY LEARNER Patient Patient   HIGHEST LEVEL OF EDUCATION - PRIMARY LEARNER  4 YEARS OF COLLEGE 2 YEARS OF COLLEGE   BARRIERS PRIMARY LEARNER NONE NONE   CO-LEARNER CAREGIVER No No   PRIMARY LANGUAGE ENGLISH ENGLISH   LEARNER PREFERENCE PRIMARY VIDEOS DEMONSTRATION     READING -   ANSWERED BY patient Patient   RELATIONSHIP SELF SELF       Depression Screening:  :     3 most recent PHQ Screens 2020   Little interest or pleasure in doing things Not at all   Feeling down, depressed, irritable, or hopeless Not at all   Total Score PHQ 2 0       Fall Risk Assessment:  :     No flowsheet data found. Abuse Screening:  :     Abuse Screening Questionnaire 2018   Do you ever feel afraid of your partner? N   Are you in a relationship with someone who physically or mentally threatens you? N   Is it safe for you to go home? Y       Coordination of Care Questionnaire:  :     1) Have you been to an emergency room, urgent care clinic since your last visit? No    Hospitalized since your last visit? No             2) Have you seen or consulted any other health care providers outside of [address] [place] [address] since your last visit?  No  (Include any pap smears or colon screenings in this section.)    Patient is accompanied by VV I have received verbal consent from [facility] to discuss any/all medical information while they are present in the room.

## 2020-12-14 NOTE — PROGRESS NOTES
Gallito Newton is a 31 y.o. female who was seen by synchronous (real-time) audio-video technology on 12/14/2020. Consent:  She and/or her healthcare decision maker is aware that this patient-initiated Telehealth encounter is a billable service, with coverage as determined by her insurance carrier. She is aware that she may receive a bill and has provided verbal consent to proceed: Yes    I was at home while conducting this encounter. Gallito Newton is a 31 y.o. female  who is here for her follow up  Evaluation for the medical bariatric care. CC: I want to be healthier    Weight History  Current weight 192and BMI is There is no height or weight on file to calculate BMI. Goal weight BMI 24   Highest weight 190  (See weight gain time line scanned into media section of chart)        Significant Medical History    Update on sleep apnea and  CPAP 7 hrs avg  Feels exhausted but cannot go to sleep    Ever had bariatric surgery: no    Pregnant or planning on becoming pregnant w/in 6 months: no         Significant Psychosocial History     Current Major Lifestyle Changes: no  Any potential unsupportive people: no          Social History  Social History     Tobacco Use    Smoking status: Never Smoker    Smokeless tobacco: Never Used   Substance Use Topics    Alcohol use: Yes     Comment: OCCASIONAL     How many times a week do you eat out? 2    Do you drink any EtOH?  no   If so, how much? Do you have upcoming any travel in the next 6 weeks?  no   If so, what do you have planned? Exercise  How many days a week do you currently exercise?   45 min 4  days  Have you ever been told by a physician not to exercise?  no      Objective  Visit Vitals  LMP 11/18/2020         Waist Circumference: See Weight Management Doc Flowsheet  Neck Circumference: See Weight Management Doc Flowsheet  Percent Body Fat: See Weight Management Doc Flowsheet  Weight Metrics 12/2/2020 5/27/2020 8/27/2019 4/17/2019 11/30/2018 10/30/2018 8/20/2018   Weight 195 lb 12.8 oz 190 lb 199 lb 181 lb 186 lb 6.4 oz 185 lb 3.2 oz 182 lb   BMI 30.67 kg/m2 29.76 kg/m2 32.12 kg/m2 28.99 kg/m2 29.86 kg/m2 29.67 kg/m2 29.15 kg/m2         Labs: went over labs from 12/2/20      Review of Systems  Complete ROS negative except where noted above          712  Subjective:   Kylee Cortes was seen for Follow-up (3 week followup)      Prior to Admission medications    Medication Sig Start Date End Date Taking? Authorizing Provider   buPROPion [facility]) 75 mg tablet Take 1 Tab by mouth two (2) times a day. 12/14/20  Yes Elijah Bustillo MD   albuterol (PROVENTIL HFA, VENTOLIN HFA, PROAIR HFA) 90 mcg/actuation inhaler Take  by inhalation. Yes Provider, Historical   linaCLOtide (Linzess) 145 mcg cap capsule Take 1 Cap by mouth Daily (before breakfast). Take 30 minutes prior to first meal of the day. 4/3/20  Yes Cal Garay MD   norethindrone-e.estradiol-iron (MINASTRIN 24 FE) 1 mg-20 mcg(24) /75 mg (4) chew Minastrin 24 Fe 1 mg-20 mcg (24)/75 mg (4) chewable tablet   Chew 1 pill at the same time daily  [facility]# [id number]   BIN# [id number] ID# [id number]      PCN#  CN   Yes Provider, Historical     No Known Allergies    Patient Active Problem List    Diagnosis Date Noted    Irritable bowel syndrome with constipation 12/02/2020    Left carpal tunnel syndrome 05/27/2020    Pap smear for cervical cancer screening 04/20/2018    Limb tremor 11/13/2014    Herpes simplex of female genitalia 05/31/2013    Asthma, exercise induced 10/15/2011     Current Outpatient Medications   Medication Sig Dispense Refill    buPROPion (WELLBUTRIN) 75 mg tablet Take 1 Tab by mouth two (2) times a day. 60 Tab 2    albuterol (PROVENTIL HFA, VENTOLIN HFA, PROAIR HFA) 90 mcg/actuation inhaler Take  by inhalation.  linaCLOtide (Linzess) 145 mcg cap capsule Take 1 Cap by mouth Daily (before breakfast). Take 30 minutes prior to first meal of the day.  90 Cap 1    norethindrone-e.estradiol-iron (MINASTRIN 24 FE) 1 mg-20 mcg(24) /75 mg (4) chew Minastrin 24 Fe 1 mg-20 mcg (24)/75 mg (4) chewable tablet   Chew 1 pill at the same time daily  [facility]# [id number]   BIN# [id number] ID# [id number]      PCN#  CN         ROS    PHYSICAL EXAMINATION:  [ INSTRUCTIONS:  \"[x]\" Indicates a positive item  \"[]\" Indicates a negative item  -- DELETE ALL ITEMS NOT EXAMINED]  Vital Signs: (As obtained by patient/caregiver at home)  Visit Vitals  LMP 11/18/2020        Constitutional: [x] Appears well-developed and well-nourished [x] No apparent distress      [] Abnormal -     Mental status: [x] Alert and awake  [x] Oriented to person/place/time [x] Able to follow commands    [] Abnormal -     Eyes:   EOM    [x]  Normal    [] Abnormal -   Sclera  [x]  Normal    [] Abnormal -          Discharge [x]  None visible   [] Abnormal -     HENT: [x] Normocephalic, atraumatic  [] Abnormal -   [x] Mouth/Throat: Mucous membranes are moist    External Ears [x] Normal  [] Abnormal -    Neck: [x] No visualized mass [] Abnormal -     Pulmonary/Chest: [x] Respiratory effort normal   [x] No visualized signs of difficulty breathing or respiratory distress        [] Abnormal -      Musculoskeletal:   [x] Normal gait with no signs of ataxia         [x] Normal range of motion of neck        [] Abnormal -     Neurological:        [x] No Facial Asymmetry (Cranial nerve 7 motor function) (limited exam due to video visit)          [x] No gaze palsy        [] Abnormal -          Skin:        [x] No significant exanthematous lesions or discoloration noted on facial skin         [] Abnormal -            Psychiatric:       [x] Normal Affect [] Abnormal -        [x] No Hallucinations    Other pertinent observable physical exam findings:-      Assessment & Plan  Diagnoses and all orders for this visit:    1. Insomnia, unspecified type    2. Obesity (BMI 30-39.9)  -     buPROPion (WELLBUTRIN) 75 mg tablet;  Take 1 Tab by mouth two (2) times a day. Diet:the wellbutrin will help get control of cravings and help sleep better which may increase weight loss    Activity: exercise 300 mins pe week    Medication: wellbutrin    Based on his history and exam, Kylee Cortes is a good candidate for the  [facility] Weight Loss Program     There are no Patient Instructions on file for this visit. Coding Help - Use CPT Codes 21107-30105, 90334-75377 for Established and New Patients respectively, either employing EM elements or Time rules. Other codes (example consult codes) may also apply. The primary encounter diagnosis was Insomnia, unspecified type. A diagnosis of Obesity (BMI 30-39. 9) was also pertinent to this visit. The patient verbalizes understanding and agrees with the plan of care      We discussed the expected course, resolution and complications of the diagnosis(es) in detail. Medication risks, benefits, costs, interactions, and alternatives were discussed as indicated. I advised her to contact the office if her condition worsens, changes or fails to improve as anticipated. She expressed understanding with the diagnosis(es) and plan. Pursuant to the emergency declaration under the [facility], [facility] waiver authority and the [employer] General Act, this Virtual  Visit was conducted, with patient's consent, to reduce the patient's risk of exposure to COVID-19 and provide continuity of care for an established patient. Services were provided through a video synchronous discussion virtually to substitute for in-person clinic visit.     Germaine Carrillo MD

## 2021-01-13 ENCOUNTER — VIRTUAL VISIT (OUTPATIENT)
Dept: FAMILY MEDICINE CLINIC | Age: 26
End: 2021-01-13
Payer: COMMERCIAL

## 2021-01-13 DIAGNOSIS — J45.990 ASTHMA, EXERCISE INDUCED: ICD-10-CM

## 2021-01-13 DIAGNOSIS — E66.9 OBESITY (BMI 30-39.9): ICD-10-CM

## 2021-01-13 DIAGNOSIS — G47.00 INSOMNIA, UNSPECIFIED TYPE: Primary | ICD-10-CM

## 2021-01-13 PROCEDURE — 99213 OFFICE O/P EST LOW 20 MIN: CPT | Performed by: FAMILY MEDICINE

## 2021-01-13 RX ORDER — BUPROPION HYDROCHLORIDE 100 MG/1
100 TABLET ORAL 2 TIMES DAILY
Qty: 60 TAB | Refills: 2 | Status: SHIPPED | OUTPATIENT
Start: 2021-01-13 | End: 2022-03-14 | Stop reason: ALTCHOICE

## 2021-01-13 NOTE — PROGRESS NOTES
Jared Payton is a 31 y.o. female who was seen by synchronous (real-time) audio-video technology on 1/13/2021. Consent:  She and/or her healthcare decision maker is aware that this patient-initiated Telehealth encounter is a billable service, with coverage as determined by her insurance carrier. She is aware that she may receive a bill and has provided verbal consent to proceed: Yes    I was at home while conducting this encounter. Jared Payton is a 31 y.o. female  who is here for her follow up  Evaluation for the medical bariatric care. CC: I want to be healthier  She has implemented intermittent fasting    Weight History  Current weight 189 lbs and BMI is There is no height or weight on file to calculate BMI. Goal weight  BMI 24  Highest weight 190  (See weight gain time line scanned into media section of chart)        Significant Medical History    Update on sleep apnea and  CPAP 6-7 hrs    Ever had bariatric surgery: no    Pregnant or planning on becoming pregnant w/in 6 months: no         Significant Psychosocial History     Current Major Lifestyle Changes: no  Any potential unsupportive people: no          Social History  Social History     Tobacco Use    Smoking status: Never Smoker    Smokeless tobacco: Never Used   Substance Use Topics    Alcohol use: Yes     Comment: OCCASIONAL     How many times a week do you eat out? 2    Do you drink any EtOH?  no   If so, how much? Do you have upcoming any travel in the next 6 weeks?  no   If so, what do you have planned? Exercise  How many days a week do you currently exercise? 3 days 60 min  Have you ever been told by a physician not to exercise?  no      Objective  There were no vitals taken for this visit.       Waist Circumference: See Weight Management Doc Flowsheet  Neck Circumference: See Weight Management Doc Flowsheet  Percent Body Fat: See Weight Management Doc Flowsheet  Weight Metrics 12/2/2020 5/27/2020 8/27/2019 4/17/2019 11/30/2018 10/30/2018 8/20/2018   Weight 195 lb 12.8 oz 190 lb 199 lb 181 lb 186 lb 6.4 oz 185 lb 3.2 oz 182 lb   BMI 30.67 kg/m2 29.76 kg/m2 32.12 kg/m2 28.99 kg/m2 29.86 kg/m2 29.67 kg/m2 29.15 kg/m2         Labs: Dec labs were reviewed, plan for recheck in march    Review of Systems  Complete ROS negative except where noted above          712  Subjective:   Kylee Cortes was seen for Weight Management      Prior to Admission medications    Medication Sig Start Date End Date Taking? Authorizing Provider   buPROPion (WELLBUTRIN) 100 mg tablet Take 1 Tab by mouth two (2) times a day. 1/13/21  Yes Sheron Patel MD   albuterol (PROVENTIL HFA, VENTOLIN HFA, PROAIR HFA) 90 mcg/actuation inhaler Take  by inhalation. Yes Provider, Historical   linaCLOtide (Linzess) 145 mcg cap capsule Take 1 Cap by mouth Daily (before breakfast). Take 30 minutes prior to first meal of the day. 4/3/20  Yes Mariela Lemus MD   buPROPion [facility]) 75 mg tablet Take 1 Tab by mouth two (2) times a day. 12/14/20 1/13/21  Sheron Patel MD   norethindrone-e.estradiol-iron (MINASTRIN 24 FE) 1 mg-20 mcg(24) /75 mg (4) chew Minastrin 24 Fe 1 mg-20 mcg (24)/75 mg (4) chewable tablet   Chew 1 pill at the same time daily  [facility]# [id number]   BIN# [id number] ID# [id number]      PCN#  CN    Provider, Historical     No Known Allergies    Patient Active Problem List    Diagnosis Date Noted    Irritable bowel syndrome with constipation 12/02/2020    Left carpal tunnel syndrome 05/27/2020    Pap smear for cervical cancer screening 04/20/2018    Limb tremor 11/13/2014    Herpes simplex of female genitalia 05/31/2013    Asthma, exercise induced 10/15/2011     Current Outpatient Medications   Medication Sig Dispense Refill    buPROPion (WELLBUTRIN) 100 mg tablet Take 1 Tab by mouth two (2) times a day. 60 Tab 2    albuterol (PROVENTIL HFA, VENTOLIN HFA, PROAIR HFA) 90 mcg/actuation inhaler Take  by inhalation.       linaCLOtide (Linzess) 145 mcg cap capsule Take 1 Cap by mouth Daily (before breakfast). Take 30 minutes prior to first meal of the day. 90 Cap 1    norethindrone-e.estradiol-iron (MINASTRIN 24 FE) 1 mg-20 mcg(24) /75 mg (4) chew Minastrin 24 Fe 1 mg-20 mcg (24)/75 mg (4) chewable tablet   Chew 1 pill at the same time daily  [facility]# [id number]   BIN# [id number] ID# [id number]      PCN#  CN         ROS    PHYSICAL EXAMINATION:  [ INSTRUCTIONS:  \"[x]\" Indicates a positive item  \"[]\" Indicates a negative item  -- DELETE ALL ITEMS NOT EXAMINED]  Vital Signs: (As obtained by patient/caregiver at home)  There were no vitals taken for this visit.      Constitutional: [x] Appears well-developed and well-nourished [x] No apparent distress      [] Abnormal -     Mental status: [x] Alert and awake  [x] Oriented to person/place/time [x] Able to follow commands    [] Abnormal -     Eyes:   EOM    [x]  Normal    [] Abnormal -   Sclera  [x]  Normal    [] Abnormal -          Discharge [x]  None visible   [] Abnormal -     HENT: [x] Normocephalic, atraumatic  [] Abnormal -   [x] Mouth/Throat: Mucous membranes are moist    External Ears [x] Normal  [] Abnormal -    Neck: [x] No visualized mass [] Abnormal -     Pulmonary/Chest: [x] Respiratory effort normal   [x] No visualized signs of difficulty breathing or respiratory distress        [] Abnormal -      Musculoskeletal:   [x] Normal gait with no signs of ataxia         [x] Normal range of motion of neck        [] Abnormal -     Neurological:        [x] No Facial Asymmetry (Cranial nerve 7 motor function) (limited exam due to video visit)          [x] No gaze palsy        [] Abnormal -          Skin:        [x] No significant exanthematous lesions or discoloration noted on facial skin         [] Abnormal -            Psychiatric:       [x] Normal Affect [] Abnormal -        [x] No Hallucinations    Other pertinent observable physical exam findings:-      Assessment & Plan  Diagnoses and all orders for this visit:    1. Insomnia, unspecified type  Doing ok  But still needs improvement. Go to bed a little earlier  Aim for 8 hrs  2. Obesity (BMI 30-39.9)  -     buPROPion (WELLBUTRIN) 100 mg tablet; Take 1 Tab by mouth two (2) times a day. Diet:start recording  Increased to 100 mg wellbutrin    Activity:increase to 300 mins per week    Medication: see above  3. Asthma, exercise induced      Stable w current exercise plan. Based on his history and exam, Kylee Munoz is a good candidate for the  [facility]     There are no Patient Instructions on file for this visit. Coding Help - Use CPT Codes 95212-74400, 44677-62187 for Established and New Patients respectively, either employing EM elements or Time rules. Other codes (example consult codes) may also apply. The primary encounter diagnosis was Insomnia, unspecified type. Diagnoses of Obesity (BMI 30-39.9) and Asthma, exercise induced were also pertinent to this visit. The patient verbalizes understanding and agrees with the plan of care      We discussed the expected course, resolution and complications of the diagnosis(es) in detail. Medication risks, benefits, costs, interactions, and alternatives were discussed as indicated. I advised her to contact the office if her condition worsens, changes or fails to improve as anticipated. She expressed understanding with the diagnosis(es) and plan. Pursuant to the emergency declaration under the [facility], [facility] waiver authority and the [employer] General Act, this Virtual  Visit was conducted, with patient's consent, to reduce the patient's risk of exposure to COVID-19 and provide continuity of care for an established patient. Services were provided through a video synchronous discussion virtually to substitute for in-person clinic visit.     Florence Petersen MD

## 2021-01-13 NOTE — PROGRESS NOTES
Patrick Garrett is a 31 y.o. female      Chief Complaint   Patient presents with    Weight Management         1. Have you been to the ER, urgent care clinic since your last visit? Hospitalized since your last visit? No      2. Have you seen or consulted any other health care providers outside of the [address] [place] [address] since your last visit? Include any pap smears or colon screening.  NO

## 2021-02-23 ENCOUNTER — OFFICE VISIT (OUTPATIENT)
Dept: URGENT CARE | Age: 26
End: 2021-02-23
Payer: COMMERCIAL

## 2021-02-23 VITALS — OXYGEN SATURATION: 97 % | RESPIRATION RATE: 18 BRPM | HEART RATE: 78 BPM | TEMPERATURE: 97.9 F

## 2021-02-23 DIAGNOSIS — Z20.822 ENCOUNTER FOR SCREENING LABORATORY TESTING FOR COVID-19 VIRUS IN ASYMPTOMATIC PATIENT: Primary | ICD-10-CM

## 2021-02-23 LAB — SARS-COV-2 POC: NEGATIVE

## 2021-02-23 PROCEDURE — 87426 SARSCOV CORONAVIRUS AG IA: CPT | Performed by: FAMILY MEDICINE

## 2021-02-23 PROCEDURE — S9083 URGENT CARE CENTER GLOBAL: HCPCS | Performed by: FAMILY MEDICINE

## 2021-02-23 NOTE — PROGRESS NOTES
This patient was seen at [address] [place] [address] Urgent Care while in their vehicle due to COVID-19 pandemic with PPE and focused examination in order to decrease community viral transmission. The patient/guardian gave verbal consent to treat. Bonnie He is a 31 y.o. female who presents for screening COVID-19 rapid test for travel. HCW. No known direct COVID-19 contacts without PPE. Denies cough, fever, SOB. The history is provided by the patient.         Past Medical History:   Diagnosis Date    Anemia NEC     Asthma     Herpes 3/2013    Migraine     Migraine     Other ill-defined conditions(799.89)     Left breast mass    Reactive airway disease     exercise induced        Past Surgical History:   Procedure Laterality Date    HX CARPAL TUNNEL RELEASE Left 05/2020    [place] BREAST SURGERY PROCEDURE UNLISTED      BENIGN MASSES REMOVED-LEFT          Family History   Problem Relation Age of Onset    Hypertension Mother     Other Mother         GERD    Hypertension Father    Blake Burrows Arthritis-osteo Father     Asthma Sister     Other Sister         idiopathic allergic reaction    Asthma Sister     Diabetes Maternal Grandfather     Heart Disease Maternal Grandfather         CHF    Diabetes Paternal Grandfather     Cancer Neg Hx     Stroke Neg Hx         Social History     Socioeconomic History    Marital status: SINGLE     Spouse name: Not on file    Number of children: Not on file    Years of education: Not on file    Highest education level: Not on file   Occupational History    Not on file   Social Needs    Financial resource strain: Not on file    Food insecurity     Worry: Not on file     Inability: Not on file    Transportation needs     Medical: Not on file     Non-medical: Not on file   Tobacco Use    Smoking status: Never Smoker    Smokeless tobacco: Never Used   Substance and Sexual Activity    Alcohol use: Yes     Comment: OCCASIONAL    Drug use: No    Sexual activity: Yes     Partners: Male     Birth control/protection: Pill   Lifestyle    Physical activity     Days per week: Not on file     Minutes per session: Not on file    Stress: Not on file   Relationships    Social connections     Talks on phone: Not on file     Gets together: Not on file     Attends Islam service: Not on file     Active member of club or organization: Not on file     Attends meetings of clubs or organizations: Not on file     Relationship status: Not on file    Intimate partner violence     Fear of current or ex partner: Not on file     Emotionally abused: Not on file     Physically abused: Not on file     Forced sexual activity: Not on file   Other Topics Concern    Not on file   Social History Narrative    Not on file                ALLERGIES: Patient has no known allergies. Review of Systems   Constitutional: Negative for fever. Respiratory: Negative for cough and shortness of breath. Gastrointestinal: Negative for diarrhea, nausea and vomiting. Vitals:    02/23/21 0824   Pulse: 78   Resp: 18   Temp: 97.9 °F (36.6 °C)   SpO2: 97%       Physical Exam  Vitals signs and nursing note reviewed. Constitutional:       General: She is not in acute distress. Appearance: She is well-developed. She is not diaphoretic. Pulmonary:      Effort: Pulmonary effort is normal.   Neurological:      Mental Status: She is alert. Psychiatric:         Behavior: Behavior normal.         Thought Content: Thought content normal.         Judgment: Judgment normal.         MDM    ICD-10-CM ICD-9-CM   1. Encounter for screening laboratory testing for COVID-19 virus in asymptomatic patient  Z20.822 V01.79       Orders Placed This Encounter    AMB POC SARS-COV-2     Order Specific Question:   Is this test for diagnosis or screening? Answer:   Screening     Order Specific Question:   Symptomatic for COVID-19 as defined by CDC?      Answer:   No     Order Specific Question:   Hospitalized for COVID-19? Answer:   No     Order Specific Question:   Admitted to ICU for COVID-19? Answer:   No     Order Specific Question:   Employed in healthcare setting? Answer:   Yes     Order Specific Question:   Resident in a congregate (group) care setting? Answer:   No     Order Specific Question:   Pregnant? Answer:   No     Order Specific Question:   Previously tested for COVID-19?      Answer:   Yes        Mask in public  Maintain Social distancing  Results via amprice: active      Results for orders placed or performed in visit on 02/23/21   AMB POC SARS-COV-2   Result Value Ref Range    SARS-COV-2 POC Negative Negative         Procedures

## 2022-03-14 ENCOUNTER — OFFICE VISIT (OUTPATIENT)
Dept: INTERNAL MEDICINE CLINIC | Age: 27
End: 2022-03-14
Payer: COMMERCIAL

## 2022-03-14 VITALS
OXYGEN SATURATION: 100 % | BODY MASS INDEX: 30.35 KG/M2 | RESPIRATION RATE: 20 BRPM | HEART RATE: 68 BPM | TEMPERATURE: 97.5 F | DIASTOLIC BLOOD PRESSURE: 77 MMHG | HEIGHT: 67 IN | SYSTOLIC BLOOD PRESSURE: 115 MMHG | WEIGHT: 193.4 LBS

## 2022-03-14 DIAGNOSIS — Z11.59 SCREENING FOR VIRAL DISEASE: ICD-10-CM

## 2022-03-14 DIAGNOSIS — K58.1 IRRITABLE BOWEL SYNDROME WITH CONSTIPATION: Primary | ICD-10-CM

## 2022-03-14 DIAGNOSIS — Z00.00 ROUTINE ADULT HEALTH MAINTENANCE: ICD-10-CM

## 2022-03-14 LAB
ALBUMIN SERPL-MCNC: 4.1 G/DL (ref 3.5–5)
ALBUMIN/GLOB SERPL: 1.3 {RATIO} (ref 1.1–2.2)
ALP SERPL-CCNC: 46 U/L (ref 45–117)
ALT SERPL-CCNC: 35 U/L (ref 12–78)
ANION GAP SERPL CALC-SCNC: 5 MMOL/L (ref 5–15)
AST SERPL-CCNC: 17 U/L (ref 15–37)
BASOPHILS # BLD: 0 K/UL (ref 0–0.1)
BASOPHILS NFR BLD: 1 % (ref 0–1)
BILIRUB SERPL-MCNC: 0.3 MG/DL (ref 0.2–1)
BUN SERPL-MCNC: 15 MG/DL (ref 6–20)
BUN/CREAT SERPL: 23 (ref 12–20)
CALCIUM SERPL-MCNC: 9.4 MG/DL (ref 8.5–10.1)
CHLORIDE SERPL-SCNC: 109 MMOL/L (ref 97–108)
CHOLEST SERPL-MCNC: 126 MG/DL
CO2 SERPL-SCNC: 25 MMOL/L (ref 21–32)
CREAT SERPL-MCNC: 0.64 MG/DL (ref 0.55–1.02)
DIFFERENTIAL METHOD BLD: NORMAL
EOSINOPHIL # BLD: 0.1 K/UL (ref 0–0.4)
EOSINOPHIL NFR BLD: 2 % (ref 0–7)
ERYTHROCYTE [DISTWIDTH] IN BLOOD BY AUTOMATED COUNT: 13.8 % (ref 11.5–14.5)
EST. AVERAGE GLUCOSE BLD GHB EST-MCNC: 103 MG/DL
GLOBULIN SER CALC-MCNC: 3.1 G/DL (ref 2–4)
GLUCOSE SERPL-MCNC: 96 MG/DL (ref 65–100)
HBA1C MFR BLD: 5.2 % (ref 4–5.6)
HCT VFR BLD AUTO: 43.3 % (ref 35–47)
HCV AB SERPL QL IA: NONREACTIVE
HDLC SERPL-MCNC: 70 MG/DL
HDLC SERPL: 1.8 {RATIO} (ref 0–5)
HGB BLD-MCNC: 13.6 G/DL (ref 11.5–16)
IMM GRANULOCYTES # BLD AUTO: 0 K/UL (ref 0–0.04)
IMM GRANULOCYTES NFR BLD AUTO: 0 % (ref 0–0.5)
LDLC SERPL CALC-MCNC: 49.4 MG/DL (ref 0–100)
LYMPHOCYTES # BLD: 2.3 K/UL (ref 0.8–3.5)
LYMPHOCYTES NFR BLD: 42 % (ref 12–49)
MCH RBC QN AUTO: 27.6 PG (ref 26–34)
MCHC RBC AUTO-ENTMCNC: 31.4 G/DL (ref 30–36.5)
MCV RBC AUTO: 87.8 FL (ref 80–99)
MONOCYTES # BLD: 0.5 K/UL (ref 0–1)
MONOCYTES NFR BLD: 9 % (ref 5–13)
NEUTS SEG # BLD: 2.5 K/UL (ref 1.8–8)
NEUTS SEG NFR BLD: 46 % (ref 32–75)
NRBC # BLD: 0 K/UL (ref 0–0.01)
NRBC BLD-RTO: 0 PER 100 WBC
PLATELET # BLD AUTO: 304 K/UL (ref 150–400)
PMV BLD AUTO: 10.4 FL (ref 8.9–12.9)
POTASSIUM SERPL-SCNC: 4.6 MMOL/L (ref 3.5–5.1)
PROT SERPL-MCNC: 7.2 G/DL (ref 6.4–8.2)
RBC # BLD AUTO: 4.93 M/UL (ref 3.8–5.2)
SODIUM SERPL-SCNC: 139 MMOL/L (ref 136–145)
TRIGL SERPL-MCNC: 33 MG/DL (ref ?–150)
VLDLC SERPL CALC-MCNC: 6.6 MG/DL
WBC # BLD AUTO: 5.5 K/UL (ref 3.6–11)

## 2022-03-14 PROCEDURE — 99214 OFFICE O/P EST MOD 30 MIN: CPT | Performed by: INTERNAL MEDICINE

## 2022-03-14 RX ORDER — NORETHINDRONE ACETATE AND ETHINYL ESTRADIOL 1; .02 MG/1; MG/1
TABLET ORAL
COMMUNITY

## 2022-03-14 NOTE — PROGRESS NOTES
Assessment/Plan:     1. Irritable bowel syndrome with constipation  -will continue use of linzess    2. BMI 30.0-30.9,adult  -has plans to work on weight reduction.     - HEMOGLOBIN A1C WITH EAG; Future  - HEMOGLOBIN A1C WITH EAG    3. Screening for viral disease    - HEPATITIS C AB; Future  - HEPATITIS C AB    4. Routine adult health maintenance  -due for fasting labs. - CBC WITH AUTOMATED DIFF; Future  - METABOLIC PANEL, COMPREHENSIVE; Future  - LIPID PANEL; Future  - LIPID PANEL  - METABOLIC PANEL, COMPREHENSIVE  - CBC WITH AUTOMATED DIFF         Follow-up Disposition:       Follow up yearly             Subjective:      Kylee Sapp is a 28 y.o. female who presents today for follow up of her ibs. Since last visit :  -working on weight reduction. Has done mediclinic in the past and is thinking about trying again.     -linzess is working well for her . Has recurrent constipation if doses are missed. Patient Care Team:  -Dr. Mcmullen Draft- gyn       Objective: Wt Readings from Last 3 Encounters:   03/14/22 193 lb 6.4 oz (87.7 kg)   12/02/20 195 lb 12.8 oz (88.8 kg)   05/27/20 190 lb (86.2 kg)     BP Readings from Last 3 Encounters:   03/14/22 115/77   12/02/20 117/75   05/27/20 120/76     Visit Vitals  /77   Pulse 68   Temp 97.5 °F (36.4 °C) (Temporal)   Resp 20   Ht 5' 7\" (1.702 m)   Wt 193 lb 6.4 oz (87.7 kg)   LMP 02/14/2022 (Approximate)   SpO2 100%   BMI 30.29 kg/m²     General appearance: alert, cooperative, no distress, appears stated age  Head: Normocephalic, without obvious abnormality, atraumatic  Neck: supple, symmetrical, trachea midline, no adenopathy, no carotid bruit and no JVD  Lungs: clear to auscultation bilaterally  Heart: regular rate and rhythm, S1, S2 normal, no murmur, click, rub or gallop  Abdomen: soft, non-tender.  Bowel sounds normal. No masses,  no organomegaly  Extremities: extremities normal, atraumatic, no cyanosis or edema              Disclaimer:  Return if symptoms worsen or fail to improve. Advised patient to call back or return to office if symptoms worsen/change/persist.     Discussed expected course/resolution/complications of diagnosis in detail with patient. Medication risks/benefits/costs/interactions/alternatives discussed with patient. Patient was given an after visit summary which includes diagnoses, current medications, & vitals. Patient expressed understanding with the diagnosis and plan.

## 2022-03-19 PROBLEM — Z12.4 PAP SMEAR FOR CERVICAL CANCER SCREENING: Status: ACTIVE | Noted: 2018-04-20

## 2022-03-20 PROBLEM — G56.02 LEFT CARPAL TUNNEL SYNDROME: Status: ACTIVE | Noted: 2020-05-27

## 2022-03-20 PROBLEM — K58.1 IRRITABLE BOWEL SYNDROME WITH CONSTIPATION: Status: ACTIVE | Noted: 2020-12-02

## 2022-04-01 DIAGNOSIS — Z11.1 SCREENING-PULMONARY TB: Primary | ICD-10-CM

## 2022-04-06 LAB
GAMMA INTERFERON BACKGROUND BLD IA-ACNC: 0.05 IU/ML
M TB IFN-G BLD-IMP: NEGATIVE
M TB IFN-G CD4+ BCKGRND COR BLD-ACNC: 0.03 IU/ML
MITOGEN IGNF BLD-ACNC: >10 IU/ML
QUANTIFERON INCUBATION, QF1T: NORMAL
QUANTIFERON TB2 AG: 0.02 IU/ML
SERVICE CMNT-IMP: NORMAL

## 2022-05-31 ENCOUNTER — VIRTUAL VISIT (OUTPATIENT)
Dept: INTERNAL MEDICINE CLINIC | Age: 27
End: 2022-05-31
Payer: COMMERCIAL

## 2022-05-31 DIAGNOSIS — R25.1 LIMB TREMOR: ICD-10-CM

## 2022-05-31 PROCEDURE — 99212 OFFICE O/P EST SF 10 MIN: CPT | Performed by: INTERNAL MEDICINE

## 2022-05-31 RX ORDER — PROPRANOLOL HYDROCHLORIDE 10 MG/1
10 TABLET ORAL DAILY
Qty: 90 TABLET | Refills: 1 | Status: SHIPPED | OUTPATIENT
Start: 2022-05-31 | End: 2022-10-11

## 2022-05-31 NOTE — PROGRESS NOTES
Verified name and birth date for privacy precautions. Chart reviewed in preparation for today's visit. Chief Complaint   Patient presents with    Medication Evaluation          Health Maintenance Due   Topic    Pap Smear          Wt Readings from Last 3 Encounters:   03/14/22 193 lb 6.4 oz (87.7 kg)   12/02/20 195 lb 12.8 oz (88.8 kg)   05/27/20 190 lb (86.2 kg)     Temp Readings from Last 3 Encounters:   03/14/22 97.5 °F (36.4 °C) (Temporal)   02/23/21 97.9 °F (36.6 °C)   12/02/20 97.3 °F (36.3 °C) (Temporal)     BP Readings from Last 3 Encounters:   03/14/22 115/77   12/02/20 117/75   05/27/20 120/76     Pulse Readings from Last 3 Encounters:   03/14/22 68   02/23/21 78   12/02/20 68         Learning Assessment:  :     Learning Assessment 4/20/2018 1/6/2015   PRIMARY LEARNER Patient Patient   HIGHEST LEVEL OF EDUCATION - PRIMARY LEARNER  4 YEARS OF COLLEGE 2 YEARS OF COLLEGE   BARRIERS PRIMARY LEARNER NONE NONE   CO-LEARNER CAREGIVER No No   PRIMARY LANGUAGE ENGLISH ENGLISH   LEARNER PREFERENCE PRIMARY VIDEOS DEMONSTRATION     READING -   ANSWERED BY patient Patient   RELATIONSHIP SELF SELF       Depression Screening:  :     3 most recent PHQ Screens 5/31/2022   Little interest or pleasure in doing things Not at all   Feeling down, depressed, irritable, or hopeless Not at all   Total Score PHQ 2 0       Fall Risk Assessment:  :     No flowsheet data found. Abuse Screening:  :     Abuse Screening Questionnaire 5/31/2022 1/31/2018   Do you ever feel afraid of your partner? N N   Are you in a relationship with someone who physically or mentally threatens you? N N   Is it safe for you to go home?  Dilia Jones

## 2022-05-31 NOTE — PROGRESS NOTES
Laury Mars is a 28 y.o. female who was seen by synchronous (real-time) audio-video technology on 5/31/2022. She confirmed that, for purposes of billing, this is a virtual visit with her provider for which we will submit a claim for reimbursement to her insurance company. She is aware that she will be responsible for any copays, coinsurance amounts or other amounts not covered by her insurance company. Do you accept - YES    This visit was completed was completed virtually using Dot      Subjective:   Kylee Cortes was seen for tremor    -patient with history of tremor with fine motor use. Has used propanolol in the past.  She stopped use of the medication a few years ago. She is finding that with increased procedures, she needs use of the propranolol again. -no resting tremor. No weakness. Prior to Admission medications    Medication Sig Start Date End Date Taking? Authorizing Provider   linaCLOtide (Linzess) 145 mcg cap capsule Take 1 Capsule by mouth daily. 4/1/22   Whitney Paget, MD   norethindrone-ethinyl estradiol (Junel 1/20, 21,) 1-20 mg-mcg tablet Junel 1/20 (21) 1 mg-20 mcg tablet   Take by oral route. Provider, Historical       No Known Allergies      ROS - per HPI      Objective:     General: alert, cooperative, no distress   Mental  status: pe mental status_general use: normal mood, behavior, speech, dress, motor activity, and thought processes, able to follow commands   Eyes: EOM intact, normal sclera   Mouth: not examined   Neck: no visualized mass   Resp: PULM - obs findings: normal effort and no respiratory distress   Neuro: neuro - obs: no gross deficits   Musculoskeletal: normal ROM of neck   Skin: skin exam: no discoloration or lesions of concern on visible areas   Psychiatric: normal affect, no hallucinations       Assessment & Plan:   1. Limb tremor  -     propranoloL (INDERAL) 10 mg tablet; Take 1 Tablet by mouth daily. , Normal, Disp-90 Tablet, R-1    -will restart use of propranolol 10mg daily. CPT Codes 74526-34384 for Established Patients may apply to this Telehealth Visit  Time-based coding, delete if not needed: I spent at least 15 minutes with this established patient, and >50% of the time was spent counseling and/or coordinating care regarding tremor    Due to this being a TeleHealth evaluation, many elements of the physical examination are unable to be assessed. Pursuant to the emergency declaration under the [address] [place] [address] authority and the [employer] Act, this Virtual  Visit was conducted, with patient's consent, to reduce the patient's risk of exposure to COVID-19 and provide continuity of care for an established patient. Services were provided through a video synchronous discussion virtually to substitute for in-person clinic visit. We discussed the expected course, resolution and complications of the diagnosis(es) in detail. Medication risks, benefits, costs, interactions, and alternatives were discussed as indicated. I advised her to contact the office if her condition worsens, changes or fails to improve as anticipated. She expressed understanding with the diagnosis(es) and plan.      Doc Hassan MD

## 2022-09-22 ENCOUNTER — OFFICE VISIT (OUTPATIENT)
Dept: GYNECOLOGY | Age: 27
End: 2022-09-22
Payer: COMMERCIAL

## 2022-09-22 VITALS
SYSTOLIC BLOOD PRESSURE: 115 MMHG | HEIGHT: 67 IN | BODY MASS INDEX: 29.03 KG/M2 | HEART RATE: 84 BPM | WEIGHT: 185 LBS | DIASTOLIC BLOOD PRESSURE: 79 MMHG

## 2022-09-22 DIAGNOSIS — D25.1 INTRAMURAL LEIOMYOMA OF UTERUS: Primary | ICD-10-CM

## 2022-09-22 DIAGNOSIS — R10.2 PELVIC PAIN: ICD-10-CM

## 2022-09-22 DIAGNOSIS — N94.6 DYSMENORRHEA: ICD-10-CM

## 2022-09-22 DIAGNOSIS — N92.0 MENORRHAGIA WITH REGULAR CYCLE: ICD-10-CM

## 2022-09-22 PROCEDURE — 99204 OFFICE O/P NEW MOD 45 MIN: CPT | Performed by: OBSTETRICS & GYNECOLOGY

## 2022-09-22 NOTE — PROGRESS NOTES
27 [facility] [address], [address]  P [phone]  F [phone]    Office Note  Patient ID:  Name:  Kathi Aguillon  MRN:  [id number]  :   y.o. Date:  2022      HISTORY OF PRESENT ILLNESS:  Kathi Aguillon is a 28 y.o.  premenopausal female who is a new patient being seen for consultation for fibroids. 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 Her mother works as a nurse in the pre-op area at [facility] [employer]. Her PCP is Dr. Deven Hare and she is followed by Dr. Jeffrey Krause at [facility] for gynecologic care. She has a dominant, enlarging fibroid noted on ultrasound, within the uterine fundus, measuring 5-6 cm in greatest dimension. It does appear to have increased in size over the last few years. Her symptoms have significantly increased as well, specifically bleeding and pain. She states her most recent menstrual period lasted 30 days. A recent SIS suggests that the fibroid does not involve the endometrial cavity. The endometrium appears normal.  There is a smaller fibroid posteriorly measuring about 1.2 cm. The ovaries appear normal.  Dr. Marilee Alvarez was going to refer her to one of her partners for a myomectomy, but they would not be able to get her onto the schedule until the end of the year. Due to her increasing symptoms, she does not want to wait that long. She was also seeking someone who could perform the procedure laparoscopically or robotically. ROS:   and GI review:  Negative  Cardiopulmonary review:  Negative   Musculoskeletal:  Negative    A comprehensive review of systems was negative except for that written in the History of Present Illness. , 10 point ROS      OB/GYN ROS/HX:        Problem List:  Patient Active Problem List    Diagnosis Date Noted    Irritable bowel syndrome with constipation 2020    Left carpal tunnel syndrome 05/27/2020    Pap smear for cervical cancer screening 04/20/2018    Limb tremor 11/13/2014    Herpes simplex of female genitalia 05/31/2013    Asthma, exercise induced 10/15/2011     PMH:  Past Medical History:   Diagnosis Date    Anemia NEC     Asthma     Herpes 3/2013    Migraine     Migraine     Other ill-defined conditions(799.89)     Left breast mass    Reactive airway disease     exercise induced      PSH:  Past Surgical History:   Procedure Laterality Date    HX CARPAL TUNNEL RELEASE Left 05/2020    MS BREAST SURGERY PROCEDURE UNLISTED      BENIGN MASSES REMOVED-LEFT       Social History:  Social History     Tobacco Use    Smoking status: Never    Smokeless tobacco: Never   Substance Use Topics    Alcohol use: Yes     Comment: OCCASIONAL      Family History:  Family History   Problem Relation Age of Onset    Hypertension Mother     Other Mother         GERD    Hypertension Father     OSTEOARTHRITIS Father     Asthma Sister     Other Sister         idiopathic allergic reaction    Asthma Sister     Diabetes Maternal Grandfather     Heart Disease Maternal Grandfather         CHF    Diabetes Paternal Grandfather     Cancer Neg Hx     Stroke Neg Hx       Medications: (reviewed)  Current Outpatient Medications   Medication Sig    propranoloL (INDERAL) 10 mg tablet Take 1 Tablet by mouth daily. linaCLOtide (Linzess) 145 mcg cap capsule Take 1 Capsule by mouth daily. norethindrone-ethinyl estradiol (MICROGESTIN 1/20) 1-20 mg-mcg tablet Junel 1/20 (21) 1 mg-20 mcg tablet   Take by oral route. No current facility-administered medications for this visit. Allergies: (reviewed)  No Known Allergies       OBJECTIVE:    Physical Exam:  VITAL SIGNS: Vitals:    09/22/22 0815   BP: 115/79   Pulse: 84   Weight: 185 lb (83.9 kg)   Height: 5' 7.01\" (1.702 m)     Body mass index is 28.97 kg/m². GENERAL ABBY: Conversant, alert, oriented. No acute distress. HEENT: HEENT. No thyroid enlargement. No JVD.    Neck: Supple without restrictions. RESPIRATORY: Clear to auscultation and percussion to the bases. No CVAT. CARDIOVASC: RRR without murmur/rub. GASTROINT: soft, non-tender, without masses or organomegaly   MUSCULOSKEL: no joint tenderness, deformity or swelling   EXTREMITIES: extremities normal, atraumatic, no cyanosis or edema   PELVIC: Normal external genitalia. Normal vagina and cervix. Uterus mildly enlarged but mobile. Adnexa not palpable. RECTAL: Deferred   JUAN SURVEY: No suspicious lymphadenopathy or edema noted. NEURO: Grossly intact. No acute deficit. Lab Data:    Lab Results   Component Value Date/Time    WBC 5.5 03/14/2022 08:46 AM    HGB 13.6 03/14/2022 08:46 AM    HCT 43.3 03/14/2022 08:46 AM    PLATELET 342 43/46/3590 08:46 AM    MCV 87.8 03/14/2022 08:46 AM     Lab Results   Component Value Date/Time    Sodium 139 03/14/2022 08:46 AM    Potassium 4.6 03/14/2022 08:46 AM    Chloride 109 (H) 03/14/2022 08:46 AM    CO2 25 03/14/2022 08:46 AM    Anion gap 5 03/14/2022 08:46 AM    Glucose 96 03/14/2022 08:46 AM    BUN 15 03/14/2022 08:46 AM    Creatinine 0.64 03/14/2022 08:46 AM    BUN/Creatinine ratio 23 (H) 03/14/2022 08:46 AM    GFR est AA >60 03/14/2022 08:46 AM    GFR est non-AA >60 03/14/2022 08:46 AM    Calcium 9.4 03/14/2022 08:46 AM         Imaging: Outside ultrasound results from [address] reviewed. Pertinent findings noted in HPI      IMPRESSION/PLAN:  Julian Farris is a 28 y.o. female with a diagnosis of uterine fibroids, along with pelvic pain, dysmenorrhea, and menorrhagia. I reviewed with Oseas Cortes her medical records, physical exam, and review of symptoms. She desires fertility-sparing myomectomy to manage her symptoms, and specifically would like to have a minimally invasive approach. Based upon her presentation and ultrasound imaging, it appears to be clear that her symptoms are secondary to the enlarging fibroid. I think it is very reasonable to proceed with myomectomy. I suggested a robotic approach and I feel very confident that we would be able to complete the procedure using that technique and without requiring a laparotomy. We will plan on removing the large anterior fibroid, and if possible, will also remove the smaller posterior fibroid. I explained to her that there is a small risk of requiring an emergent hysterectomy for bleeding, thought that risk is extremely small. I also explained that she will most likely require a  for delivery in the future, if and when she becomes pregnant, depending upon the depth of involvement of the fibroid into the myometrium. She was counseled on the risks, benefits, indications and alternatives of the planned procedure. We discussed the possible surgical risks of bleeding, infection, potential injury to other organs/structures, and the risks of anesthesia. Her questions were answered to her satisfaction and she wishes to proceed as planned. Prior to surgery I am going to send her for a pelvic MRI to better map out a surgical plan and ensure there won't be any surprises. An electronic signature was used to sign this note.     Yann Ricks MD  22

## 2022-09-22 NOTE — LETTER
2022 9:15 AM    Patient:  Jagdeep Gifford   YOB: 1995  Date of Visit: 2022      Dear Jerson Mccoy MD  [address]   [address] [address]  Via Fax: [phone] MD Joo  [address] [facility]  [address]  [place] [id number]  Via In Basket: Thank you for referring Ms. Kylee Cortes to me for evaluation/treatment. Below are the relevant portions of my assessment and plan of care. New patient self referred for fibroids. 27 [facility], [address], [address]  P [phone]  F [phone]    Office Note  Patient ID:  Name:  Jagdeep Gifford  MRN:  [id number]  :  1995/27 y.o. Date:  2022      HISTORY OF PRESENT ILLNESS:  Jagdeep Gifford is a 28 y.o.  premenopausal female who is a new patient being seen for consultation for fibroids. 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 Her mother works as a nurse in the pre-op area at [employer]. Her PCP is Dr. Ok Arora and she is followed by Dr. Dennie Purl at [facility] for gynecologic care. She has a dominant, enlarging fibroid noted on ultrasound, within the uterine fundus, measuring 5-6 cm in greatest dimension. It does appear to have increased in size over the last few years. Her symptoms have significantly increased as well, specifically bleeding and pain. She states her most recent menstrual period lasted 30 days. A recent SIS suggests that the fibroid does not involve the endometrial cavity. The endometrium appears normal.  There is a smaller fibroid posteriorly measuring about 1.2 cm. The ovaries appear normal.  Dr. Barbara Austin was going to refer her to one of her partners for a myomectomy, but they would not be able to get her onto the schedule until the end of the year. Due to her increasing symptoms, she does not want to wait that long.   She was also seeking someone who could perform the procedure laparoscopically or robotically. ROS:   and GI review:  Negative  Cardiopulmonary review:  Negative   Musculoskeletal:  Negative    A comprehensive review of systems was negative except for that written in the History of Present Illness. , 10 point ROS      OB/GYN ROS/HX:        Problem List:  Patient Active Problem List    Diagnosis Date Noted    Irritable bowel syndrome with constipation 2020    Left carpal tunnel syndrome 2020    Pap smear for cervical cancer screening 2018    Limb tremor 2014    Herpes simplex of female genitalia 2013    Asthma, exercise induced 10/15/2011     PMH:  Past Medical History:   Diagnosis Date    Anemia NEC     Asthma     Herpes 3/2013    Migraine     Migraine     Other ill-defined conditions(799.89)     Left breast mass    Reactive airway disease     exercise induced      PSH:  Past Surgical History:   Procedure Laterality Date    HX CARPAL TUNNEL RELEASE Left 2020    RI BREAST SURGERY PROCEDURE UNLISTED      BENIGN MASSES REMOVED-LEFT       Social History:  Social History     Tobacco Use    Smoking status: Never    Smokeless tobacco: Never   Substance Use Topics    Alcohol use: Yes     Comment: OCCASIONAL      Family History:  Family History   Problem Relation Age of Onset    Hypertension Mother     Other Mother         GERD    Hypertension Father     OSTEOARTHRITIS Father     Asthma Sister     Other Sister         idiopathic allergic reaction    Asthma Sister     Diabetes Maternal Grandfather     Heart Disease Maternal Grandfather         CHF    Diabetes Paternal Grandfather     Cancer Neg Hx     Stroke Neg Hx       Medications: (reviewed)  Current Outpatient Medications   Medication Sig    propranoloL (INDERAL) 10 mg tablet Take 1 Tablet by mouth daily.  linaCLOtide (Linzess) 145 mcg cap capsule Take 1 Capsule by mouth daily.     norethindrone-ethinyl estradiol (MICROGESTIN 1/20) 1-20 mg-mcg tablet Junel 1/20 (21) 1 mg-20 mcg tablet   Take by oral route. No current facility-administered medications for this visit. Allergies: (reviewed)  No Known Allergies       OBJECTIVE:    Physical Exam:  VITAL SIGNS: Vitals:    09/22/22 0815   BP: 115/79   Pulse: 84   Weight: 185 lb (83.9 kg)   Height: 5' 7.01\" (1.702 m)     Body mass index is 28.97 kg/m². GENERAL ABBY: Conversant, alert, oriented. No acute distress. HEENT: HEENT. No thyroid enlargement. No JVD. Neck: Supple without restrictions. RESPIRATORY: Clear to auscultation and percussion to the bases. No CVAT. CARDIOVASC: RRR without murmur/rub. GASTROINT: soft, non-tender, without masses or organomegaly   MUSCULOSKEL: no joint tenderness, deformity or swelling   EXTREMITIES: extremities normal, atraumatic, no cyanosis or edema   PELVIC: Normal external genitalia. Normal vagina and cervix. Uterus mildly enlarged but mobile. Adnexa not palpable. RECTAL: Deferred   JUAN SURVEY: No suspicious lymphadenopathy or edema noted. NEURO: Grossly intact. No acute deficit. Lab Data:    Lab Results   Component Value Date/Time    WBC 5.5 03/14/2022 08:46 AM    HGB 13.6 03/14/2022 08:46 AM    HCT 43.3 03/14/2022 08:46 AM    PLATELET 711 24/89/1459 08:46 AM    MCV 87.8 03/14/2022 08:46 AM     Lab Results   Component Value Date/Time    Sodium 139 03/14/2022 08:46 AM    Potassium 4.6 03/14/2022 08:46 AM    Chloride 109 (H) 03/14/2022 08:46 AM    CO2 25 03/14/2022 08:46 AM    Anion gap 5 03/14/2022 08:46 AM    Glucose 96 03/14/2022 08:46 AM    BUN 15 03/14/2022 08:46 AM    Creatinine 0.64 03/14/2022 08:46 AM    BUN/Creatinine ratio 23 (H) 03/14/2022 08:46 AM    GFR est AA >60 03/14/2022 08:46 AM    GFR est non-AA >60 03/14/2022 08:46 AM    Calcium 9.4 03/14/2022 08:46 AM         Imaging: Outside ultrasound results from [address] reviewed.   Pertinent findings noted in HPI      IMPRESSION/PLAN:  Rajesh temple a 28 y.o. female with a diagnosis of uterine fibroids, along with pelvic pain, dysmenorrhea, and menorrhagia. I reviewed with Sundar Cortes her medical records, physical exam, and review of symptoms. She desires fertility-sparing myomectomy to manage her symptoms, and specifically would like to have a minimally invasive approach. Based upon her presentation and ultrasound imaging, it appears to be clear that her symptoms are secondary to the enlarging fibroid. I think it is very reasonable to proceed with myomectomy. I suggested a robotic approach and I feel very confident that we would be able to complete the procedure using that technique and without requiring a laparotomy. We will plan on removing the large anterior fibroid, and if possible, will also remove the smaller posterior fibroid. I explained to her that there is a small risk of requiring an emergent hysterectomy for bleeding, thought that risk is extremely small. I also explained that she will most likely require a  for delivery in the future, if and when she becomes pregnant, depending upon the depth of involvement of the fibroid into the myometrium. She was counseled on the risks, benefits, indications and alternatives of the planned procedure. We discussed the possible surgical risks of bleeding, infection, potential injury to other organs/structures, and the risks of anesthesia. Her questions were answered to her satisfaction and she wishes to proceed as planned. Prior to surgery I am going to send her for a pelvic MRI to better map out a surgical plan and ensure there won't be any surprises. An electronic signature was used to sign this note. Roxana Rowell MD  22              If you have questions, please do not hesitate to call me. I look forward to following Ms. Cortes along with you.         Sincerely,      Roxana Rowell MD

## 2022-10-07 ENCOUNTER — HOSPITAL ENCOUNTER (OUTPATIENT)
Dept: MRI IMAGING | Age: 27
Discharge: HOME | End: 2022-10-07
Attending: OBSTETRICS & GYNECOLOGY
Payer: COMMERCIAL

## 2022-10-07 DIAGNOSIS — N94.6 DYSMENORRHEA: ICD-10-CM

## 2022-10-07 DIAGNOSIS — R10.2 PELVIC PAIN: ICD-10-CM

## 2022-10-07 DIAGNOSIS — D25.1 INTRAMURAL LEIOMYOMA OF UTERUS: ICD-10-CM

## 2022-10-07 DIAGNOSIS — N92.0 MENORRHAGIA WITH REGULAR CYCLE: ICD-10-CM

## 2022-10-07 PROCEDURE — 74011000258 HC RX REV CODE- 258: Performed by: OBSTETRICS & GYNECOLOGY

## 2022-10-07 PROCEDURE — 74011250636 HC RX REV CODE- 250/636

## 2022-10-07 PROCEDURE — A9576 INJ PROHANCE MULTIPACK: HCPCS

## 2022-10-07 PROCEDURE — 74011000250 HC RX REV CODE- 250: Performed by: OBSTETRICS & GYNECOLOGY

## 2022-10-07 PROCEDURE — 72197 MRI PELVIS W/O & W/DYE: CPT

## 2022-10-07 RX ORDER — SODIUM CHLORIDE 0.9 % (FLUSH) 0.9 %
10 SYRINGE (ML) INJECTION
Status: COMPLETED | OUTPATIENT
Start: 2022-10-07 | End: 2022-10-07

## 2022-10-07 RX ADMIN — SODIUM CHLORIDE, PRESERVATIVE FREE 10 ML: 5 INJECTION INTRAVENOUS at 09:31

## 2022-10-07 RX ADMIN — GADOTERIDOL 17 ML: 279.3 INJECTION, SOLUTION INTRAVENOUS at 09:30

## 2022-10-07 RX ADMIN — SODIUM CHLORIDE 100 ML: 9 INJECTION, SOLUTION INTRAVENOUS at 09:31

## 2022-10-10 NOTE — PROGRESS NOTES
27 [facility] [address], [address]  P [phone]  F [phone]    Office Note  Patient ID:  Name:  Stephanie Cruz  MRN:  [id number]  :  27 y.o. Date:  10/11/2022      HISTORY OF PRESENT ILLNESS:  Stephanie Cruz is a 28 y.o.  premenopausal female who is a newly established patient being seen for consultation for fibroids. 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 Her mother works as a nurse in the pre-op area at [employer]. Her PCP is Dr. Angela Knox and she is followed by Dr. Salvador Lacy at [facility] for gynecologic care. She has a dominant, enlarging fibroid noted on ultrasound, within the uterine fundus, measuring 5-6 cm in greatest dimension. It does appear to have increased in size over the last few years. Her symptoms have significantly increased as well, specifically bleeding and pain. She states her most recent menstrual period lasted 30 days. A recent SIS suggests that the fibroid does not involve the endometrial cavity. The endometrium appears normal.  There is a smaller fibroid posteriorly measuring about 1.2 cm. The ovaries appear normal.  Dr. Radha Flores was going to refer her to one of her partners for a myomectomy, but they would not be able to get her onto the schedule until the end of the year. Due to her increasing symptoms, she does not want to wait that long. She was also seeking someone who could perform the procedure laparoscopically or robotically. Based upon her presentation and ultrasound imaging, it appears to be clear that her symptoms are secondary to the enlarging fibroid. I think it is very reasonable to proceed with myomectomy. I suggested a robotic approach and I feel very confident that we would be able to complete the procedure using that technique and without requiring a laparotomy.   We will plan on removing the large anterior fibroid, and if possible, will also remove the smaller posterior fibroid. I explained to her that there is a small risk of requiring an emergent hysterectomy for bleeding, thought that risk is extremely small. I also explained that she will most likely require a  for delivery in the future, if and when she becomes pregnant, depending upon the depth of involvement of the fibroid into the myometrium. Prior to surgery I decided to send her for a pelvic MRI to better map out a surgical plan and ensure there won't be any surprises. She presents today to review those results. ROS:   and GI review:  Negative  Cardiopulmonary review:  Negative   Musculoskeletal:  Negative    A comprehensive review of systems was negative except for that written in the History of Present Illness. , 10 point ROS      OB/GYN ROS/HX:        Problem List:  Patient Active Problem List    Diagnosis Date Noted    Irritable bowel syndrome with constipation 2020    Left carpal tunnel syndrome 2020    Pap smear for cervical cancer screening 2018    Limb tremor 2014    Herpes simplex of female genitalia 2013    Asthma, exercise induced 10/15/2011     PMH:  Past Medical History:   Diagnosis Date    Anemia NEC     Asthma     Herpes 3/2013    Migraine     Migraine     Other ill-defined conditions(799.89)     Left breast mass    Reactive airway disease     exercise induced      PSH:  Past Surgical History:   Procedure Laterality Date    HX CARPAL TUNNEL RELEASE Left 2020    ND BREAST SURGERY PROCEDURE UNLISTED      BENIGN MASSES REMOVED-LEFT       Social History:  Social History     Tobacco Use    Smoking status: Never    Smokeless tobacco: Never   Substance Use Topics    Alcohol use: Yes     Comment: OCCASIONAL      Family History:  Family History   Problem Relation Age of Onset    Hypertension Mother     Other Mother         GERD    Hypertension Father     OSTEOARTHRITIS Father     Asthma Sister Other Sister         idiopathic allergic reaction    Asthma Sister     Diabetes Maternal Grandfather     Heart Disease Maternal Grandfather         CHF    Diabetes Paternal Grandfather     Cancer Neg Hx     Stroke Neg Hx       Medications: (reviewed)  Current Outpatient Medications   Medication Sig    linaCLOtide (Linzess) 145 mcg cap capsule Take 1 Capsule by mouth daily. norethindrone-ethinyl estradiol (MICROGESTIN 1/20) 1-20 mg-mcg tablet Junel 1/20 (21) 1 mg-20 mcg tablet   Take by oral route. propranoloL (INDERAL) 10 mg tablet Take 1 Tablet by mouth daily. No current facility-administered medications for this visit. Allergies: (reviewed)  No Known Allergies       OBJECTIVE:    Physical Exam:  VITAL SIGNS: Vitals:    10/11/22 0844   BP: 118/79   Pulse: 84   Weight: 185 lb (83.9 kg)   Height: 5' 7.01\" (1.702 m)       Body mass index is 28.97 kg/m². GENERAL ABBY: Conversant, alert, oriented. No acute distress. HEENT: HEENT. No thyroid enlargement. No JVD. Neck: Supple without restrictions. RESPIRATORY: Clear to auscultation and percussion to the bases. No CVAT. CARDIOVASC: RRR without murmur/rub. GASTROINT: soft, non-tender, without masses or organomegaly   MUSCULOSKEL: no joint tenderness, deformity or swelling   EXTREMITIES: extremities normal, atraumatic, no cyanosis or edema   PELVIC: Normal external genitalia. Normal vagina and cervix. Uterus mildly enlarged but mobile. Adnexa not palpable. RECTAL: Deferred   JUAN SURVEY: No suspicious lymphadenopathy or edema noted. NEURO: Grossly intact. No acute deficit.        Lab Data:    Lab Results   Component Value Date/Time    WBC 5.5 03/14/2022 08:46 AM    HGB 13.6 03/14/2022 08:46 AM    HCT 43.3 03/14/2022 08:46 AM    PLATELET 342 42/07/9727 08:46 AM    MCV 87.8 03/14/2022 08:46 AM     Lab Results   Component Value Date/Time    Sodium 139 03/14/2022 08:46 AM    Potassium 4.6 03/14/2022 08:46 AM    Chloride 109 (H) 03/14/2022 08:46 AM CO2 25 03/14/2022 08:46 AM    Anion gap 5 03/14/2022 08:46 AM    Glucose 96 03/14/2022 08:46 AM    BUN 15 03/14/2022 08:46 AM    Creatinine 0.64 03/14/2022 08:46 AM    BUN/Creatinine ratio 23 (H) 03/14/2022 08:46 AM    GFR est AA >60 03/14/2022 08:46 AM    GFR est non-AA >60 03/14/2022 08:46 AM    Calcium 9.4 03/14/2022 08:46 AM         Imaging: Outside ultrasound results from [address] reviewed. Pertinent findings noted in HPI      Pelvic MRI (10/7/22)  The uterus measures 6.6 x 6.2 x 8.8 cm . There is a large intramural  uterine leiomyoma in the uterine fundus measuring 4.2 x 5.5 x 4.1 cm. There is a  smaller uterine leiomyoma in the lower uterine segment posteriorly measuring  approximately 1.4 cm in maximum diameter. Endometrial stripe thickness is 3 mm. The uterus is anteverted. Bilateral ovaries are unremarkable with small follicles. The cervix is  unremarkable. Minimal free fluid is present in the pelvis likely physiologic. No  adenopathy is identified in the pelvis. Visualized osseous structures are  unremarkable. IMPRESSION  Uterine leiomyoma. Single large intramural uterine leiomyoma in the  uterine fundus as described. IMPRESSION/PLAN:  Tomás Key is a 28 y.o. female with a diagnosis of uterine fibroids, along with pelvic pain, dysmenorrhea, and menorrhagia. I reviewed with Edgar Cortes her medical records, physical exam, and review of symptoms. She desires fertility-sparing myomectomy to manage her symptoms, and specifically would like to have a minimally invasive approach. Based upon her presentation and ultrasound imaging, it appears to be clear that her symptoms are secondary to the enlarging fibroid. I think it is very reasonable to proceed with myomectomy. I suggested a robotic approach and I feel very confident that we would be able to complete the procedure using that technique and without requiring a laparotomy.   We will plan on removing the large anterior fibroid, and if possible, will also remove the smaller posterior fibroid. I explained to her that there is a small risk of requiring an emergent hysterectomy for bleeding, thought that risk is extremely small. I also explained that she will most likely require a  for delivery in the future, if and when she becomes pregnant, depending upon the depth of involvement of the fibroid into the myometrium. She was counseled on the risks, benefits, indications and alternatives of the planned procedure. We discussed the possible surgical risks of bleeding, infection, potential injury to other organs/structures, and the risks of anesthesia. Her questions were answered to her satisfaction and she wishes to proceed as planned. Prior to surgery I decided to send her for a pelvic MRI to better map out a surgical plan and ensure there won't be any surprises. I reviewed the CT images with her. There is a dominant fibroid arising from the uterine fundus. It appears to be centered in the midline and does not involve the endometrial cavity. She is scheduled for mid-November. An electronic signature was used to sign this note.     Marvin Laws MD  10/11/22

## 2022-10-11 ENCOUNTER — OFFICE VISIT (OUTPATIENT)
Dept: GYNECOLOGY | Age: 27
End: 2022-10-11
Payer: COMMERCIAL

## 2022-10-11 VITALS
BODY MASS INDEX: 29.03 KG/M2 | DIASTOLIC BLOOD PRESSURE: 79 MMHG | WEIGHT: 185 LBS | HEART RATE: 84 BPM | HEIGHT: 67 IN | SYSTOLIC BLOOD PRESSURE: 118 MMHG

## 2022-10-11 DIAGNOSIS — D25.1 INTRAMURAL LEIOMYOMA OF UTERUS: Primary | ICD-10-CM

## 2022-10-11 DIAGNOSIS — R10.2 PELVIC PAIN: ICD-10-CM

## 2022-10-11 DIAGNOSIS — N92.0 MENORRHAGIA WITH REGULAR CYCLE: ICD-10-CM

## 2022-10-11 DIAGNOSIS — N94.6 DYSMENORRHEA: ICD-10-CM

## 2022-10-11 PROCEDURE — 99213 OFFICE O/P EST LOW 20 MIN: CPT | Performed by: OBSTETRICS & GYNECOLOGY

## 2022-11-07 ENCOUNTER — TELEPHONE (OUTPATIENT)
Dept: INTERNAL MEDICINE CLINIC | Age: 27
End: 2022-11-07

## 2022-11-09 ENCOUNTER — HOSPITAL ENCOUNTER (OUTPATIENT)
Dept: PREADMISSION TESTING | Age: 27
Discharge: HOME | End: 2022-11-09
Payer: COMMERCIAL

## 2022-11-09 VITALS
WEIGHT: 169.75 LBS | DIASTOLIC BLOOD PRESSURE: 79 MMHG | TEMPERATURE: 98.2 F | HEIGHT: 67 IN | BODY MASS INDEX: 26.64 KG/M2 | SYSTOLIC BLOOD PRESSURE: 117 MMHG | HEART RATE: 90 BPM

## 2022-11-09 LAB
ALBUMIN SERPL-MCNC: 3.8 G/DL (ref 3.5–5)
ALBUMIN/GLOB SERPL: 1.3 {RATIO} (ref 1.1–2.2)
ALP SERPL-CCNC: 44 U/L (ref 45–117)
ALT SERPL-CCNC: 35 U/L (ref 12–78)
ANION GAP SERPL CALC-SCNC: 9 MMOL/L (ref 5–15)
AST SERPL-CCNC: 17 U/L (ref 15–37)
BASOPHILS # BLD: 0 K/UL (ref 0–0.1)
BASOPHILS NFR BLD: 1 % (ref 0–1)
BILIRUB SERPL-MCNC: 0.5 MG/DL (ref 0.2–1)
BUN SERPL-MCNC: 8 MG/DL (ref 6–20)
BUN/CREAT SERPL: 11 (ref 12–20)
CALCIUM SERPL-MCNC: 9.1 MG/DL (ref 8.5–10.1)
CHLORIDE SERPL-SCNC: 106 MMOL/L (ref 97–108)
CO2 SERPL-SCNC: 25 MMOL/L (ref 21–32)
CREAT SERPL-MCNC: 0.71 MG/DL (ref 0.55–1.02)
DIFFERENTIAL METHOD BLD: ABNORMAL
EOSINOPHIL # BLD: 0.1 K/UL (ref 0–0.4)
EOSINOPHIL NFR BLD: 2 % (ref 0–7)
ERYTHROCYTE [DISTWIDTH] IN BLOOD BY AUTOMATED COUNT: 13.2 % (ref 11.5–14.5)
GLOBULIN SER CALC-MCNC: 3 G/DL (ref 2–4)
GLUCOSE SERPL-MCNC: 80 MG/DL (ref 65–100)
HCT VFR BLD AUTO: 39.5 % (ref 35–47)
HGB BLD-MCNC: 12.9 G/DL (ref 11.5–16)
IMM GRANULOCYTES # BLD AUTO: 0 K/UL (ref 0–0.04)
IMM GRANULOCYTES NFR BLD AUTO: 0 % (ref 0–0.5)
LYMPHOCYTES # BLD: 2.3 K/UL (ref 0.8–3.5)
LYMPHOCYTES NFR BLD: 54 % (ref 12–49)
MCH RBC QN AUTO: 27.6 PG (ref 26–34)
MCHC RBC AUTO-ENTMCNC: 32.7 G/DL (ref 30–36.5)
MCV RBC AUTO: 84.6 FL (ref 80–99)
MONOCYTES # BLD: 0.4 K/UL (ref 0–1)
MONOCYTES NFR BLD: 9 % (ref 5–13)
NEUTS SEG # BLD: 1.4 K/UL (ref 1.8–8)
NEUTS SEG NFR BLD: 34 % (ref 32–75)
NRBC # BLD: 0 K/UL (ref 0–0.01)
NRBC BLD-RTO: 0 PER 100 WBC
PLATELET # BLD AUTO: 258 K/UL (ref 150–400)
PMV BLD AUTO: 10.3 FL (ref 8.9–12.9)
POTASSIUM SERPL-SCNC: 3.4 MMOL/L (ref 3.5–5.1)
PROT SERPL-MCNC: 6.8 G/DL (ref 6.4–8.2)
RBC # BLD AUTO: 4.67 M/UL (ref 3.8–5.2)
SODIUM SERPL-SCNC: 140 MMOL/L (ref 136–145)
WBC # BLD AUTO: 4.2 K/UL (ref 3.6–11)

## 2022-11-09 PROCEDURE — 80053 COMPREHEN METABOLIC PANEL: CPT

## 2022-11-09 PROCEDURE — 85025 COMPLETE CBC W/AUTO DIFF WBC: CPT

## 2022-11-09 PROCEDURE — 36415 COLL VENOUS BLD VENIPUNCTURE: CPT

## 2022-11-09 NOTE — PERIOP NOTES
[address] INSTRUCTIONS    Surgery Date:   11/16    Your surgeon's office or [facility] staff will call you between 4 PM- 8 PM the day before surgery with your arrival time. If your surgery is on a Monday, you will receive a call the preceding Friday. Please report to [facility] Patient Access/Admitting on the 1st floor. Bring your insurance card, photo identification, and any copayment ( if applicable). If you are going home the same day of your surgery, you must have a responsible adult to drive you home. You need to have a responsible adult to stay with you the first 24 hours after surgery and you should not drive a car for 24 hours following your surgery. Nothing to eat or drink after midnight the night before surgery. This includes no water, gum, mints, coffee, juice, etc.  Please note special instructions, if applicable, below for medications. Do NOT drink alcohol or smoke 24 hours before surgery. STOP smoking for 14 days prior as it helps with breathing and healing after surgery. If you are being admitted to the hospital, please leave personal belongings/luggage in your car until you have an assigned hospital room number. Please wear comfortable clothes. Wear your glasses instead of contacts. We ask that all money, jewelry and valuables be left at home. Wear no make up, particularly mascara, the day of surgery. All body piercings, rings, and jewelry need to be removed and left at home. Please remove any nail polish or artificial nails from your fingernails. Please wear your hair loose or down. Please no pony-tails, buns, or any metal hair accessories. If you shower the morning of surgery, please do not apply any lotions or powders afterwards. You may wear deodorant, unless having breast surgery. Do not shave any body area within 24 hours of your surgery. Please follow all instructions to avoid any potential surgical cancellation.   Should your physical condition change, (i.e. fever, cold, flu, etc.) please notify your surgeon as soon as possible. It is important to be on time. If a situation occurs where you may be delayed, please call:  [phone] / [phone] on the day of surgery. The Preadmission Testing staff can be reached at [phone]. Special instructions: NONE      Current Outpatient Medications   Medication Sig    linaCLOtide (Linzess) 145 mcg cap capsule Take 1 Capsule by mouth daily. norethindrone-ethinyl estradiol (MICROGESTIN 1/20) 1-20 mg-mcg tablet Junel 1/20 (21) 1 mg-20 mcg tablet   Take by oral route. No current facility-administered medications for this encounter. YOU MUST ONLY TAKE THESE MEDICATIONS THE MORNING OF SURGERY WITH A SIP OF WATER: NONE  MEDICATIONS TO TAKE THE MORNING OF SURGERY ONLY IF NEEDED: NONE  HOLD these prescription medications BEFORE Surgery: NONE  Ask your surgeon/prescribing physician about when/if to STOP taking these medications: NONE  Stop all vitamins, herbal medicines and Aspirin containing products 7 days prior to surgery. Stop any non-steroidal anti-inflammatory drugs (i.e. Ibuprofen, Naproxen, Advil, Aleve) 3 days before surgery. You may take Tylenol. If you are currently taking Plavix, Coumadin, or any other blood-thinning/anticoagulant medication contact your prescribing physician for instructions. Preventing Infections Before and After - Your Surgery    IMPORTANT INSTRUCTIONS      You play an important role in your health and preparation for surgery. To reduce the germs on your skin you will need to shower with CHG soap (Chorhexidine gluconate 4%) two times before surgery. CHG soap (Hibiclens, Hex-A-Clens or store brand)  CHG soap will be provided at your Preadmission Testing (PAT) appointment. If you do not have a PAT appointment before surgery, you may arrange to  CHG soap from our office or purchase CHG soap at a pharmacy, grocery or department store.   You need to purchase TWO 4 ounce bottles to use for your 2 showers. Steps to follow:  Jazzy Ravel your hair with your normal shampoo and your body with regular soap and rinse well to remove shampoo and soap from your skin. Wet a clean washcloth and turn off the shower. Put CHG soap on washcloth and apply to your entire body from the neck down. Do not use on your head, face or private parts(genitals). Do not use CHG soap on open sores, wounds or areas of skin irritation. Wash you body gently for 5 minutes. Do not wash your skin too hard. This soap does not create lather. Pay special attention to your underarms and from your belly button to your feet. Turn the shower back on and rinse well to get CHG soap off your body. Pat your skin dry with a clean, dry towel. Do not apply lotions or moisturizer. Put on clean clothes and sleep on fresh bed sheets and do not allow pets to sleep with you. Shower with CHG soap 2 times before your surgery  The evening before your surgery  The morning of your surgery      Tips to help prevent infections after your surgery:  Protect your surgical wound from germs:  Hand washing is the most important thing you and your caregivers can do to prevent infections. Keep your bandage clean and dry! Do not touch your surgical wound. Use clean, freshly washed towels and washcloths every time you shower; do not share bath linens with others. Until your surgical wound is healed, wear clothing and sleep on bed linens each day that are clean and freshly washed. Do not allow pets to sleep in your bed with you or touch your surgical wound. Do not smoke - smoking delays wound healing. This may be a good time to stop smoking. If you have diabetes, it is important for you to manage your blood sugar levels properly before your surgery as well as after your surgery. Poorly managed blood sugar levels slow down wound healing and prevent you from healing completely.           Patient Information Regarding COVID Restrictions    Day of Procedure    Please park in the parking deck or any designated visitor parking lot. Enter the facility through the Main Entrance of the hospital.  On the day of surgery, please provide the cell phone number of the person who will be waiting for you to the Patient Access representative at the time of registration. Masks are highly recommended in the hospital, but not required. Once your procedure and the immediate recovery period is completed, a nurse in the recovery area will contact your designated visitor to inform them of your room number or to otherwise review other pertinent information regarding your care. Social distancing practices are strongly encouraged in waiting areas and the cafeteria. The patient was contacted  in person. She verbalized understanding of all instructions does not  need reinforcement.

## 2022-11-15 ENCOUNTER — ANESTHESIA EVENT (OUTPATIENT)
Dept: SURGERY | Age: 27
End: 2022-11-15
Payer: COMMERCIAL

## 2022-11-15 NOTE — H&P
[facility] GYNECOLOGIC ONCOLOGY  [address] [facility], [address], [address] [place]  P [phone]  F [phone]        Patient ID:  Name:  Carrie Vegas  MRN:  [id number]  :  1995/27 y.o. Date:  11/15/2022      HISTORY OF PRESENT ILLNESS:  Carrie Vegas is a 28 y.o.  premenopausal female who is a new patient being seen for consultation for fibroids. 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 Her mother works as a nurse in the pre-op area at [employer]. Her PCP is Dr. Ramesh Capps and she is followed by Dr. Doreen Beal at [facility] [place] for gynecologic care. She has a dominant, enlarging fibroid noted on ultrasound, within the uterine fundus, measuring 5-6 cm in greatest dimension. It does appear to have increased in size over the last few years. Her symptoms have significantly increased as well, specifically bleeding and pain. She states her most recent menstrual period lasted 30 days. A recent SIS suggests that the fibroid does not involve the endometrial cavity. The endometrium appears normal.  There is a smaller fibroid posteriorly measuring about 1.2 cm. The ovaries appear normal.  Dr. Rodman Holstein was going to refer her to one of her partners for a myomectomy, but they would not be able to get her onto the schedule until the end of the year. Due to her increasing symptoms, she does not want to wait that long. She was also seeking someone who could perform the procedure laparoscopically or robotically. ROS:   and GI review:  Negative  Cardiopulmonary review:  Negative   Musculoskeletal:  Negative    A comprehensive review of systems was negative except for that written in the History of Present Illness. 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    Herpes simplex of female genitalia 05/31/2013    Asthma, exercise induced 10/15/2011     PMH:  Past Medical History:   Diagnosis Date    Anemia NEC     Asthma     EXERCISE AND WEATHER    Herpes 03/2013    Migraine     Migraine     Other ill-defined conditions(799.89)     Left breast mass    Reactive airway disease     exercise induced      PSH:  Past Surgical History:   Procedure Laterality Date    HX CARPAL TUNNEL RELEASE Left 05/2020    HX ORTHOPAEDIC Left     CARPAL TUNNEL RELEASE    MN BREAST SURGERY PROCEDURE UNLISTED      BENIGN MASSES REMOVED-LEFT       Social History:  Social History     Tobacco Use    Smoking status: Never    Smokeless tobacco: Never   Substance Use Topics    Alcohol use: Not Currently     Comment: 5 DRINKS WEEKLY      Family History:  Family History   Problem Relation Age of Onset    Hypertension Mother     GERD Mother     Hypertension Father     OSTEOARTHRITIS Father     Asthma Sister     Other Sister         idiopathic allergic reaction    Asthma Sister     Diabetes Maternal Grandfather     Heart Disease Maternal Grandfather         CHF    Diabetes Paternal Grandmother     Diabetes Paternal Grandfather     Cancer Neg Hx     Stroke Neg Hx       Medications: (reviewed)  No current facility-administered medications for this encounter. Current Outpatient Medications   Medication Sig    linaCLOtide (Linzess) 145 mcg cap capsule Take 1 Capsule by mouth daily. norethindrone-ethinyl estradiol (MICROGESTIN 1/20) 1-20 mg-mcg tablet Junel 1/20 (21) 1 mg-20 mcg tablet   Take by oral route. Allergies: (reviewed)  No Known Allergies       OBJECTIVE:    Physical Exam:  VITAL SIGNS: There were no vitals filed for this visit. There is no height or weight on file to calculate BMI. GENERAL ABBY: Conversant, alert, oriented. No acute distress. HEENT: HEENT. No thyroid enlargement. No JVD.    Neck: Supple without restrictions. RESPIRATORY: Clear to auscultation and percussion to the bases. No CVAT. CARDIOVASC: RRR without murmur/rub. GASTROINT: soft, non-tender, without masses or organomegaly   MUSCULOSKEL: no joint tenderness, deformity or swelling   EXTREMITIES: extremities normal, atraumatic, no cyanosis or edema   PELVIC: Normal external genitalia. Normal vagina and cervix. Uterus mildly enlarged but mobile. Adnexa not palpable. RECTAL: Deferred   JUAN SURVEY: No suspicious lymphadenopathy or edema noted. NEURO: Grossly intact. No acute deficit. Lab Data:    Lab Results   Component Value Date/Time    WBC 4.2 11/09/2022 03:30 PM    HGB 12.9 11/09/2022 03:30 PM    HCT 39.5 11/09/2022 03:30 PM    PLATELET 907 49/60/4309 03:30 PM    MCV 84.6 11/09/2022 03:30 PM     Lab Results   Component Value Date/Time    Sodium 140 11/09/2022 03:30 PM    Potassium 3.4 (L) 11/09/2022 03:30 PM    Chloride 106 11/09/2022 03:30 PM    CO2 25 11/09/2022 03:30 PM    Anion gap 9 11/09/2022 03:30 PM    Glucose 80 11/09/2022 03:30 PM    BUN 8 11/09/2022 03:30 PM    Creatinine 0.71 11/09/2022 03:30 PM    BUN/Creatinine ratio 11 (L) 11/09/2022 03:30 PM    GFR est AA >60 03/14/2022 08:46 AM    GFR est non-AA >60 03/14/2022 08:46 AM    Calcium 9.1 11/09/2022 03:30 PM         Imaging: Outside ultrasound results from [facility] reviewed. Pertinent findings noted in HPI      MRI of pelvis (10/7/22)  The uterus measures 6.6 x 6.2 x 8.8 cm . There is a large intramural  uterine leiomyoma in the uterine fundus measuring 4.2 x 5.5 x 4.1 cm. There is a  smaller uterine leiomyoma in the lower uterine segment posteriorly measuring  approximately 1.4 cm in maximum diameter. Endometrial stripe thickness is 3 mm. The uterus is anteverted. Bilateral ovaries are unremarkable with small follicles. The cervix is  unremarkable. Minimal free fluid is present in the pelvis likely physiologic. No  adenopathy is identified in the pelvis.  Visualized osseous structures are  unremarkable. IMPRESSION  Uterine leiomyoma. Single large intramural uterine leiomyoma in the  uterine fundus as described. IMPRESSION/PLAN:  Dora Morrell is a 28 y.o. female with a diagnosis of uterine fibroids, along with pelvic pain, dysmenorrhea, and menorrhagia. I reviewed with Landen Cortes her medical records, physical exam, and review of symptoms. She desires fertility-sparing myomectomy to manage her symptoms, and specifically would like to have a minimally invasive approach. Based upon her presentation and ultrasound imaging, it appears to be clear that her symptoms are secondary to the enlarging fibroid. I think it is very reasonable to proceed with myomectomy. I suggested a robotic approach and I feel very confident that we would be able to complete the procedure using that technique and without requiring a laparotomy. We will plan on removing the large anterior fibroid, and if possible, will also remove the smaller posterior fibroid. I explained to her that there is a small risk of requiring an emergent hysterectomy for bleeding, thought that risk is extremely small. I also explained that she will most likely require a  for delivery in the future, if and when she becomes pregnant, depending upon the depth of involvement of the fibroid into the myometrium. She was counseled on the risks, benefits, indications and alternatives of the planned procedure. We discussed the possible surgical risks of bleeding, infection, potential injury to other organs/structures, and the risks of anesthesia. Her questions were answered to her satisfaction and she wishes to proceed as planned. Prior to surgery I opted to send her for a pelvic MRI to better map out a surgical plan and ensure there wouldn't be any surprises. An electronic signature was used to sign this note.     Nata Montejo MD  11/15/22        Date of Surgery Update  Dora Morrell was seen and examined. History and physical has been reviewed. The patient has been examined. There have been no significant clinical changes since the completion of the originally dated History and Physical.  Patient identified by surgeon; surgical site was confirmed by patient and surgeon. Planned procedure again discussed. Questions invited and answered. Surgical consent signed by patient. Patient wishes to proceed with planned procedure.       Alida Fletcher MD  November 16, 2022  7:21 AM

## 2022-11-16 ENCOUNTER — HOSPITAL ENCOUNTER (OUTPATIENT)
Age: 27
Discharge: HOME | End: 2022-11-18
Attending: OBSTETRICS & GYNECOLOGY | Admitting: OBSTETRICS & GYNECOLOGY
Payer: COMMERCIAL

## 2022-11-16 ENCOUNTER — ANESTHESIA (OUTPATIENT)
Dept: SURGERY | Age: 27
End: 2022-11-16
Payer: COMMERCIAL

## 2022-11-16 DIAGNOSIS — G89.18 POST-OP PAIN: Primary | ICD-10-CM

## 2022-11-16 PROBLEM — D25.9 LEIOMYOMA OF UTERUS: Status: ACTIVE | Noted: 2022-11-16

## 2022-11-16 LAB — HCG UR QL: NEGATIVE

## 2022-11-16 PROCEDURE — 77030040922 HC BLNKT HYPOTHRM STRY -A

## 2022-11-16 PROCEDURE — 77030020703 HC SEAL CANN DISP INTU -B: Performed by: OBSTETRICS & GYNECOLOGY

## 2022-11-16 PROCEDURE — 74011000250 HC RX REV CODE- 250

## 2022-11-16 PROCEDURE — 77030008684 HC TU ET CUF COVD -B: Performed by: ANESTHESIOLOGY

## 2022-11-16 PROCEDURE — 76210000002 HC OR PH I REC 3 TO 3.5 HR: Performed by: OBSTETRICS & GYNECOLOGY

## 2022-11-16 PROCEDURE — 74011250636 HC RX REV CODE- 250/636: Performed by: OBSTETRICS & GYNECOLOGY

## 2022-11-16 PROCEDURE — 74011250637 HC RX REV CODE- 250/637: Performed by: ANESTHESIOLOGY

## 2022-11-16 PROCEDURE — 81025 URINE PREGNANCY TEST: CPT

## 2022-11-16 PROCEDURE — 77030031492 HC PRT ACC BLNT AIRSEAL CNMD -B: Performed by: OBSTETRICS & GYNECOLOGY

## 2022-11-16 PROCEDURE — 77030013079 HC BLNKT BAIR HGGR 3M -A: Performed by: ANESTHESIOLOGY

## 2022-11-16 PROCEDURE — 88305 TISSUE EXAM BY PATHOLOGIST: CPT

## 2022-11-16 PROCEDURE — 74011250637 HC RX REV CODE- 250/637: Performed by: OBSTETRICS & GYNECOLOGY

## 2022-11-16 PROCEDURE — 74011000250 HC RX REV CODE- 250: Performed by: ANESTHESIOLOGY

## 2022-11-16 PROCEDURE — 76060000037 HC ANESTHESIA 3 TO 3.5 HR: Performed by: OBSTETRICS & GYNECOLOGY

## 2022-11-16 PROCEDURE — 76010000877 HC OR TIME 2.5 TO 3HR INTENSV - TIER 2: Performed by: OBSTETRICS & GYNECOLOGY

## 2022-11-16 PROCEDURE — 77030026438 HC STYL ET INTUB CARD -A: Performed by: ANESTHESIOLOGY

## 2022-11-16 PROCEDURE — 74011250636 HC RX REV CODE- 250/636: Performed by: ANESTHESIOLOGY

## 2022-11-16 PROCEDURE — 77030002934 HC SUT MCRYL J&J -B: Performed by: OBSTETRICS & GYNECOLOGY

## 2022-11-16 PROCEDURE — 74011000250 HC RX REV CODE- 250: Performed by: OBSTETRICS & GYNECOLOGY

## 2022-11-16 PROCEDURE — 51798 US URINE CAPACITY MEASURE: CPT

## 2022-11-16 PROCEDURE — 74011000258 HC RX REV CODE- 258

## 2022-11-16 PROCEDURE — 77030002933 HC SUT MCRYL J&J -A: Performed by: OBSTETRICS & GYNECOLOGY

## 2022-11-16 PROCEDURE — 77030035029 HC NDL INSUF VERES DISP COVD -B: Performed by: OBSTETRICS & GYNECOLOGY

## 2022-11-16 PROCEDURE — 74011250636 HC RX REV CODE- 250/636

## 2022-11-16 PROCEDURE — 77030007955 HC PCH ENDOSC SPEC J&J -B: Performed by: OBSTETRICS & GYNECOLOGY

## 2022-11-16 PROCEDURE — 77030039895 HC SYST SMK EVAC LAP COVD -B: Performed by: OBSTETRICS & GYNECOLOGY

## 2022-11-16 PROCEDURE — 77030003666 HC NDL SPINAL BD -A: Performed by: OBSTETRICS & GYNECOLOGY

## 2022-11-16 PROCEDURE — 2709999900 HC NON-CHARGEABLE SUPPLY: Performed by: OBSTETRICS & GYNECOLOGY

## 2022-11-16 PROCEDURE — 77030035277 HC OBTRTR BLDELSS DISP INTU -B: Performed by: OBSTETRICS & GYNECOLOGY

## 2022-11-16 RX ORDER — ACETAMINOPHEN 325 MG/1
650 TABLET ORAL ONCE
Status: COMPLETED | OUTPATIENT
Start: 2022-11-16 | End: 2022-11-16

## 2022-11-16 RX ORDER — PHENYLEPHRINE HCL IN 0.9% NACL 0.4MG/10ML
SYRINGE (ML) INTRAVENOUS AS NEEDED
Status: DISCONTINUED | OUTPATIENT
Start: 2022-11-16 | End: 2022-11-16 | Stop reason: HOSPADM

## 2022-11-16 RX ORDER — BUPIVACAINE HYDROCHLORIDE 5 MG/ML
INJECTION, SOLUTION EPIDURAL; INTRACAUDAL AS NEEDED
Status: DISCONTINUED | OUTPATIENT
Start: 2022-11-16 | End: 2022-11-16 | Stop reason: HOSPADM

## 2022-11-16 RX ORDER — OXYCODONE HYDROCHLORIDE 5 MG/1
5 TABLET ORAL
Status: DISCONTINUED | OUTPATIENT
Start: 2022-11-16 | End: 2022-11-17

## 2022-11-16 RX ORDER — NEOSTIGMINE METHYLSULFATE 1 MG/ML
INJECTION, SOLUTION INTRAVENOUS AS NEEDED
Status: DISCONTINUED | OUTPATIENT
Start: 2022-11-16 | End: 2022-11-16 | Stop reason: HOSPADM

## 2022-11-16 RX ORDER — PROPOFOL 10 MG/ML
INJECTION, EMULSION INTRAVENOUS AS NEEDED
Status: DISCONTINUED | OUTPATIENT
Start: 2022-11-16 | End: 2022-11-16 | Stop reason: HOSPADM

## 2022-11-16 RX ORDER — ROCURONIUM BROMIDE 10 MG/ML
INJECTION, SOLUTION INTRAVENOUS AS NEEDED
Status: DISCONTINUED | OUTPATIENT
Start: 2022-11-16 | End: 2022-11-16 | Stop reason: HOSPADM

## 2022-11-16 RX ORDER — MIDAZOLAM HYDROCHLORIDE 1 MG/ML
INJECTION, SOLUTION INTRAMUSCULAR; INTRAVENOUS AS NEEDED
Status: DISCONTINUED | OUTPATIENT
Start: 2022-11-16 | End: 2022-11-16 | Stop reason: HOSPADM

## 2022-11-16 RX ORDER — LIDOCAINE HYDROCHLORIDE 20 MG/ML
INJECTION, SOLUTION EPIDURAL; INFILTRATION; INTRACAUDAL; PERINEURAL AS NEEDED
Status: DISCONTINUED | OUTPATIENT
Start: 2022-11-16 | End: 2022-11-16 | Stop reason: HOSPADM

## 2022-11-16 RX ORDER — SODIUM CHLORIDE 0.9 % (FLUSH) 0.9 %
5-40 SYRINGE (ML) INJECTION EVERY 8 HOURS
Status: DISCONTINUED | OUTPATIENT
Start: 2022-11-16 | End: 2022-11-18 | Stop reason: HOSPADM

## 2022-11-16 RX ORDER — FENTANYL CITRATE 50 UG/ML
INJECTION, SOLUTION INTRAMUSCULAR; INTRAVENOUS AS NEEDED
Status: DISCONTINUED | OUTPATIENT
Start: 2022-11-16 | End: 2022-11-16 | Stop reason: HOSPADM

## 2022-11-16 RX ORDER — DIPHENHYDRAMINE HYDROCHLORIDE 50 MG/ML
12.5 INJECTION, SOLUTION INTRAMUSCULAR; INTRAVENOUS
Status: DISCONTINUED | OUTPATIENT
Start: 2022-11-16 | End: 2022-11-18 | Stop reason: HOSPADM

## 2022-11-16 RX ORDER — ACETAMINOPHEN 325 MG/1
650 TABLET ORAL EVERY 6 HOURS
Status: DISCONTINUED | OUTPATIENT
Start: 2022-11-16 | End: 2022-11-18 | Stop reason: HOSPADM

## 2022-11-16 RX ORDER — HYDROMORPHONE HYDROCHLORIDE 1 MG/ML
0.2 INJECTION, SOLUTION INTRAMUSCULAR; INTRAVENOUS; SUBCUTANEOUS
Status: DISCONTINUED | OUTPATIENT
Start: 2022-11-16 | End: 2022-11-16 | Stop reason: HOSPADM

## 2022-11-16 RX ORDER — DEXAMETHASONE SODIUM PHOSPHATE 4 MG/ML
INJECTION, SOLUTION INTRA-ARTICULAR; INTRALESIONAL; INTRAMUSCULAR; INTRAVENOUS; SOFT TISSUE AS NEEDED
Status: DISCONTINUED | OUTPATIENT
Start: 2022-11-16 | End: 2022-11-16 | Stop reason: HOSPADM

## 2022-11-16 RX ORDER — SODIUM CHLORIDE 0.9 % (FLUSH) 0.9 %
5-40 SYRINGE (ML) INJECTION AS NEEDED
Status: DISCONTINUED | OUTPATIENT
Start: 2022-11-16 | End: 2022-11-16 | Stop reason: HOSPADM

## 2022-11-16 RX ORDER — SODIUM CHLORIDE 9 MG/ML
75 INJECTION, SOLUTION INTRAVENOUS CONTINUOUS
Status: DISCONTINUED | OUTPATIENT
Start: 2022-11-16 | End: 2022-11-18 | Stop reason: HOSPADM

## 2022-11-16 RX ORDER — PROCHLORPERAZINE EDISYLATE 5 MG/ML
10 INJECTION INTRAMUSCULAR; INTRAVENOUS
Status: DISCONTINUED | OUTPATIENT
Start: 2022-11-16 | End: 2022-11-18 | Stop reason: HOSPADM

## 2022-11-16 RX ORDER — ONDANSETRON 2 MG/ML
4 INJECTION INTRAMUSCULAR; INTRAVENOUS AS NEEDED
Status: DISCONTINUED | OUTPATIENT
Start: 2022-11-16 | End: 2022-11-16 | Stop reason: HOSPADM

## 2022-11-16 RX ORDER — ONDANSETRON 2 MG/ML
4 INJECTION INTRAMUSCULAR; INTRAVENOUS
Status: DISCONTINUED | OUTPATIENT
Start: 2022-11-16 | End: 2022-11-18 | Stop reason: HOSPADM

## 2022-11-16 RX ORDER — HYDROMORPHONE HYDROCHLORIDE 1 MG/ML
INJECTION, SOLUTION INTRAMUSCULAR; INTRAVENOUS; SUBCUTANEOUS AS NEEDED
Status: DISCONTINUED | OUTPATIENT
Start: 2022-11-16 | End: 2022-11-16 | Stop reason: HOSPADM

## 2022-11-16 RX ORDER — GLYCOPYRROLATE 0.2 MG/ML
INJECTION INTRAMUSCULAR; INTRAVENOUS AS NEEDED
Status: DISCONTINUED | OUTPATIENT
Start: 2022-11-16 | End: 2022-11-16 | Stop reason: HOSPADM

## 2022-11-16 RX ORDER — FENTANYL CITRATE 50 UG/ML
50 INJECTION, SOLUTION INTRAMUSCULAR; INTRAVENOUS AS NEEDED
Status: DISCONTINUED | OUTPATIENT
Start: 2022-11-16 | End: 2022-11-16 | Stop reason: HOSPADM

## 2022-11-16 RX ORDER — ONDANSETRON 2 MG/ML
INJECTION INTRAMUSCULAR; INTRAVENOUS AS NEEDED
Status: DISCONTINUED | OUTPATIENT
Start: 2022-11-16 | End: 2022-11-16 | Stop reason: HOSPADM

## 2022-11-16 RX ORDER — SODIUM CHLORIDE, SODIUM LACTATE, POTASSIUM CHLORIDE, CALCIUM CHLORIDE 600; 310; 30; 20 MG/100ML; MG/100ML; MG/100ML; MG/100ML
50 INJECTION, SOLUTION INTRAVENOUS CONTINUOUS
Status: DISCONTINUED | OUTPATIENT
Start: 2022-11-16 | End: 2022-11-16 | Stop reason: HOSPADM

## 2022-11-16 RX ORDER — LIDOCAINE HYDROCHLORIDE 10 MG/ML
0.1 INJECTION, SOLUTION EPIDURAL; INFILTRATION; INTRACAUDAL; PERINEURAL AS NEEDED
Status: DISCONTINUED | OUTPATIENT
Start: 2022-11-16 | End: 2022-11-16 | Stop reason: HOSPADM

## 2022-11-16 RX ORDER — TRAMADOL HYDROCHLORIDE 50 MG/1
50 TABLET ORAL
Status: DISCONTINUED | OUTPATIENT
Start: 2022-11-16 | End: 2022-11-17

## 2022-11-16 RX ORDER — SODIUM CHLORIDE 0.9 % (FLUSH) 0.9 %
5-40 SYRINGE (ML) INJECTION EVERY 8 HOURS
Status: DISCONTINUED | OUTPATIENT
Start: 2022-11-16 | End: 2022-11-16 | Stop reason: HOSPADM

## 2022-11-16 RX ORDER — HYDROMORPHONE HYDROCHLORIDE 1 MG/ML
1 INJECTION, SOLUTION INTRAMUSCULAR; INTRAVENOUS; SUBCUTANEOUS
Status: DISCONTINUED | OUTPATIENT
Start: 2022-11-16 | End: 2022-11-18 | Stop reason: HOSPADM

## 2022-11-16 RX ORDER — FENTANYL CITRATE 50 UG/ML
25 INJECTION, SOLUTION INTRAMUSCULAR; INTRAVENOUS
Status: DISCONTINUED | OUTPATIENT
Start: 2022-11-16 | End: 2022-11-16 | Stop reason: HOSPADM

## 2022-11-16 RX ORDER — MIDAZOLAM HYDROCHLORIDE 1 MG/ML
1 INJECTION, SOLUTION INTRAMUSCULAR; INTRAVENOUS AS NEEDED
Status: DISCONTINUED | OUTPATIENT
Start: 2022-11-16 | End: 2022-11-16 | Stop reason: HOSPADM

## 2022-11-16 RX ORDER — KETOROLAC TROMETHAMINE 30 MG/ML
INJECTION, SOLUTION INTRAMUSCULAR; INTRAVENOUS AS NEEDED
Status: DISCONTINUED | OUTPATIENT
Start: 2022-11-16 | End: 2022-11-16 | Stop reason: HOSPADM

## 2022-11-16 RX ORDER — KETOROLAC TROMETHAMINE 30 MG/ML
30 INJECTION, SOLUTION INTRAMUSCULAR; INTRAVENOUS EVERY 6 HOURS
Status: COMPLETED | OUTPATIENT
Start: 2022-11-16 | End: 2022-11-17

## 2022-11-16 RX ORDER — SODIUM CHLORIDE 0.9 % (FLUSH) 0.9 %
5-40 SYRINGE (ML) INJECTION AS NEEDED
Status: DISCONTINUED | OUTPATIENT
Start: 2022-11-16 | End: 2022-11-18 | Stop reason: HOSPADM

## 2022-11-16 RX ADMIN — PROPOFOL 200 MG: 10 INJECTION, EMULSION INTRAVENOUS at 07:28

## 2022-11-16 RX ADMIN — SODIUM CHLORIDE 125 ML/HR: 9 INJECTION, SOLUTION INTRAVENOUS at 19:30

## 2022-11-16 RX ADMIN — ONDANSETRON 4 MG: 2 INJECTION INTRAMUSCULAR; INTRAVENOUS at 14:15

## 2022-11-16 RX ADMIN — DEXMEDETOMIDINE HYDROCHLORIDE 4 MCG: 100 INJECTION, SOLUTION, CONCENTRATE INTRAVENOUS at 08:01

## 2022-11-16 RX ADMIN — MIDAZOLAM 2 MG: 1 INJECTION INTRAMUSCULAR; INTRAVENOUS at 07:23

## 2022-11-16 RX ADMIN — SODIUM CHLORIDE, PRESERVATIVE FREE 10 ML: 5 INJECTION INTRAVENOUS at 14:19

## 2022-11-16 RX ADMIN — HYDROMORPHONE HYDROCHLORIDE 0.2 MG: 1 INJECTION, SOLUTION INTRAMUSCULAR; INTRAVENOUS; SUBCUTANEOUS at 11:06

## 2022-11-16 RX ADMIN — Medication 3 MG: at 09:40

## 2022-11-16 RX ADMIN — Medication 80 MCG: at 09:56

## 2022-11-16 RX ADMIN — FENTANYL CITRATE 25 MCG: 0.05 INJECTION, SOLUTION INTRAMUSCULAR; INTRAVENOUS at 10:55

## 2022-11-16 RX ADMIN — HYDROMORPHONE HYDROCHLORIDE 0.4 MG: 1 INJECTION, SOLUTION INTRAMUSCULAR; INTRAVENOUS; SUBCUTANEOUS at 11:31

## 2022-11-16 RX ADMIN — HYDROMORPHONE HYDROCHLORIDE 0.5 MG: 1 INJECTION, SOLUTION INTRAMUSCULAR; INTRAVENOUS; SUBCUTANEOUS at 09:40

## 2022-11-16 RX ADMIN — KETOROLAC TROMETHAMINE 30 MG: 30 INJECTION, SOLUTION INTRAMUSCULAR; INTRAVENOUS at 15:31

## 2022-11-16 RX ADMIN — DEXMEDETOMIDINE HYDROCHLORIDE 10 MCG: 100 INJECTION, SOLUTION, CONCENTRATE INTRAVENOUS at 07:32

## 2022-11-16 RX ADMIN — SODIUM CHLORIDE, POTASSIUM CHLORIDE, SODIUM LACTATE AND CALCIUM CHLORIDE: 600; 310; 30; 20 INJECTION, SOLUTION INTRAVENOUS at 10:24

## 2022-11-16 RX ADMIN — HYDROMORPHONE HYDROCHLORIDE 0.2 MG: 1 INJECTION, SOLUTION INTRAMUSCULAR; INTRAVENOUS; SUBCUTANEOUS at 11:21

## 2022-11-16 RX ADMIN — HYDROMORPHONE HYDROCHLORIDE 0.5 MG: 1 INJECTION, SOLUTION INTRAMUSCULAR; INTRAVENOUS; SUBCUTANEOUS at 07:40

## 2022-11-16 RX ADMIN — HYDROMORPHONE HYDROCHLORIDE 1 MG: 1 INJECTION, SOLUTION INTRAMUSCULAR; INTRAVENOUS; SUBCUTANEOUS at 14:55

## 2022-11-16 RX ADMIN — SODIUM CHLORIDE, POTASSIUM CHLORIDE, SODIUM LACTATE AND CALCIUM CHLORIDE 50 ML/HR: 600; 310; 30; 20 INJECTION, SOLUTION INTRAVENOUS at 07:12

## 2022-11-16 RX ADMIN — ROCURONIUM BROMIDE 40 MG: 10 SOLUTION INTRAVENOUS at 07:28

## 2022-11-16 RX ADMIN — CEFOXITIN SODIUM 2 G: 2 POWDER, FOR SOLUTION INTRAVENOUS at 09:40

## 2022-11-16 RX ADMIN — HYDROMORPHONE HYDROCHLORIDE 0.5 MG: 1 INJECTION, SOLUTION INTRAMUSCULAR; INTRAVENOUS; SUBCUTANEOUS at 07:56

## 2022-11-16 RX ADMIN — ONDANSETRON HYDROCHLORIDE 4 MG: 2 INJECTION, SOLUTION INTRAMUSCULAR; INTRAVENOUS at 09:40

## 2022-11-16 RX ADMIN — HYDROMORPHONE HYDROCHLORIDE 0.2 MG: 1 INJECTION, SOLUTION INTRAMUSCULAR; INTRAVENOUS; SUBCUTANEOUS at 11:49

## 2022-11-16 RX ADMIN — KETOROLAC TROMETHAMINE 30 MG: 30 INJECTION, SOLUTION INTRAMUSCULAR; INTRAVENOUS at 22:44

## 2022-11-16 RX ADMIN — DEXMEDETOMIDINE HYDROCHLORIDE 8 MCG: 100 INJECTION, SOLUTION, CONCENTRATE INTRAVENOUS at 07:53

## 2022-11-16 RX ADMIN — Medication 80 MCG: at 09:59

## 2022-11-16 RX ADMIN — DEXMEDETOMIDINE HYDROCHLORIDE 8 MCG: 100 INJECTION, SOLUTION, CONCENTRATE INTRAVENOUS at 07:58

## 2022-11-16 RX ADMIN — OXYCODONE 5 MG: 5 TABLET ORAL at 20:40

## 2022-11-16 RX ADMIN — ACETAMINOPHEN 650 MG: 325 TABLET ORAL at 07:04

## 2022-11-16 RX ADMIN — FENTANYL CITRATE 100 MCG: 50 INJECTION INTRAMUSCULAR; INTRAVENOUS at 07:28

## 2022-11-16 RX ADMIN — CEFOXITIN SODIUM 2 G: 2 POWDER, FOR SOLUTION INTRAVENOUS at 07:40

## 2022-11-16 RX ADMIN — FENTANYL CITRATE 25 MCG: 0.05 INJECTION, SOLUTION INTRAMUSCULAR; INTRAVENOUS at 11:12

## 2022-11-16 RX ADMIN — DEXMEDETOMIDINE HYDROCHLORIDE 10 MCG: 100 INJECTION, SOLUTION, CONCENTRATE INTRAVENOUS at 07:37

## 2022-11-16 RX ADMIN — LIDOCAINE HYDROCHLORIDE 60 MG: 20 INJECTION, SOLUTION EPIDURAL; INFILTRATION; INTRACAUDAL; PERINEURAL at 07:28

## 2022-11-16 RX ADMIN — ACETAMINOPHEN 650 MG: 325 TABLET ORAL at 20:40

## 2022-11-16 RX ADMIN — Medication 80 MCG: at 10:05

## 2022-11-16 RX ADMIN — HYDROMORPHONE HYDROCHLORIDE 0.5 MG: 1 INJECTION, SOLUTION INTRAMUSCULAR; INTRAVENOUS; SUBCUTANEOUS at 07:49

## 2022-11-16 RX ADMIN — SODIUM CHLORIDE 125 ML/HR: 9 INJECTION, SOLUTION INTRAVENOUS at 12:02

## 2022-11-16 RX ADMIN — DEXAMETHASONE SODIUM PHOSPHATE 8 MG: 4 INJECTION, SOLUTION INTRAMUSCULAR; INTRAVENOUS at 08:09

## 2022-11-16 RX ADMIN — KETOROLAC TROMETHAMINE 30 MG: 30 INJECTION, SOLUTION INTRAMUSCULAR; INTRAVENOUS at 09:45

## 2022-11-16 RX ADMIN — ACETAMINOPHEN 650 MG: 325 TABLET ORAL at 15:32

## 2022-11-16 RX ADMIN — HYDROMORPHONE HYDROCHLORIDE 1 MG: 1 INJECTION, SOLUTION INTRAMUSCULAR; INTRAVENOUS; SUBCUTANEOUS at 22:44

## 2022-11-16 RX ADMIN — ROCURONIUM BROMIDE 10 MG: 10 SOLUTION INTRAVENOUS at 08:58

## 2022-11-16 RX ADMIN — GLYCOPYRROLATE 0.4 MG: 0.2 INJECTION INTRAMUSCULAR; INTRAVENOUS at 09:40

## 2022-11-16 NOTE — PROGRESS NOTES
Attempted to deliver and verbally explain the Vergie Gram with patient. Patient was with clinical staff.  Josleyn Foreman, Care Management Assistant

## 2022-11-16 NOTE — PERIOP NOTES
Interceed opened onto sterile field to be used by surgeon.     REF [place] [id number]  EXP 2025-07-31

## 2022-11-16 NOTE — PERIOP NOTES
Surgicel powder was given to sterile field to be used by surgeon     REF [id number]  LOT [id number]  EXP 01/31/2024

## 2022-11-16 NOTE — PROGRESS NOTES
TRANSFER - OUT REPORT:    Verbal report given to [facility], RN(name) on Dora Morrell  being transferred to (unit) for routine post - op       Report consisted of patients Situation, Background, Assessment and   Recommendations(SBAR). Information from the following report(s) SBAR, Kardex, OR Summary, Procedure Summary, Intake/Output, MAR, and Cardiac Rhythm Sinus  was reviewed with the receiving nurse. Lines:   Peripheral IV 11/16/22 Distal;Left;Posterior Forearm (Active)   Site Assessment Clean, dry, & intact 11/16/22 1030   Phlebitis Assessment 0 11/16/22 1030   Infiltration Assessment 0 11/16/22 1030   Dressing Status Clean, dry, & intact 11/16/22 1030   Dressing Type Transparent 11/16/22 1030   Hub Color/Line Status Pink 11/16/22 1030   Action Taken Open ports on tubing capped 11/16/22 1030   Alcohol Cap Used Yes 11/16/22 1030        Opportunity for questions and clarification was provided.       Patient transported with:   [employer]

## 2022-11-16 NOTE — ANESTHESIA PREPROCEDURE EVALUATION
Relevant Problems   RESPIRATORY SYSTEM   (+) Asthma, exercise induced       Anesthetic History   No history of anesthetic complications            Review of Systems / Medical History  Patient summary reviewed, nursing notes reviewed and pertinent labs reviewed    Pulmonary            Asthma : well controlled       Neuro/Psych   Within defined limits           Cardiovascular  Within defined limits                Exercise tolerance: >4 METS     GI/Hepatic/Renal  Within defined limits              Endo/Other  Within defined limits           Other Findings              Physical Exam    Airway  Mallampati: II  TM Distance: 4 - 6 cm  Neck ROM: normal range of motion   Mouth opening: Normal     Cardiovascular  Regular rate and rhythm,  S1 and S2 normal,  no murmur, click, rub, or gallop             Dental  No notable dental hx       Pulmonary  Breath sounds clear to auscultation               Abdominal  GI exam deferred       Other Findings            Anesthetic Plan    ASA: 2  Anesthesia type: general          Induction: Intravenous  Anesthetic plan and risks discussed with: Patient

## 2022-11-16 NOTE — OP NOTES
Gynecologic Oncology Operative Report    Joey Garcia  11/16/2022    Pre-operative dx:  Leiomyoma of the uterus     Post-operative dx:  Leiomyoma of the uterus     Procedure:  Robotic-assisted laparoscopic myomectomy     Surgeon:  Magda Zavala MD     Assistant:  Brandon Zambrano PA-C (Assistance was required due to the difficulty of the procedure. They actively assisted with the necessary dissection and proper identification of relevant anatomy throughout the course of the procedure.)     Anesthesia:  GETA     EBL:  25 cc     Complications:  None    Implants:  None     Specimens:    ID Type Source Tests Collected by Time Destination   1 : FUNDAL FIBROID Fresh Uterus  Kal Ortiz MD 11/16/2022 9045 Pathology   2 : posterior fibroid Fresh Uterus  Kal Ortiz MD 11/16/2022 9964 Pathology     Operative indications:  29 yo BF with symptomatic uterine fibroids. She has a dominant uterine fibroid in the uterine fundus which has grown in size over the last few years. Her periods are heavy and painful. She came to me for myomectomy. I suggested a robotic approach. Operative findings:  5 cm fundal fibroid, slightly more anterior than posterior, and essentially midline. The fallopian tube appeared to travel below and slightly posterior to the fibroid. The fibroid involved the myometrium, but did not reach the endometrium. Smaller fibroid on the posterior aspect of the uterus in the lower fundus, measuring about 2 cm. This fibroid was very superficial and did not appear to involve the myometrium, or at least very minimally. Normal tubes and ovaries bilaterally. Chromotubation unsuccessful due to rupture of the manipulator balloon, which did not occlude the lower uterine segment allowed the methylene blue to drain through the cervix instead of through the tubes.      Procedure in detail: After the risks, benefits, indications, and alternatives of the procedure were discussed with the patient and informed consent was obtained, the patient was taken to the operating room. She was positively identified, administered general anesthesia, and then placed in the dorsal lithotomy position in [address] [place] [address]. An exam under anesthesia was performed. She was then prepped and draped in the usual fashion. A dior catheter was inserted. An open-sided speculum was used to visualize the cervix. The cervix was grasped with a single-tooth tenaculum and then progressively dilated using cervical dilators. Indocyanine green was then injected on either side of the cervix in preparation for sentinel lymph node mapping. I then placed a diagnostic V-Care uterine manipulator. We then proceeded with laparoscopy. A horizontal incision was made in the midline above the level of the umbilicus. A Veres needle was inserted into the peritoneal cavity and the placement was confirmed. The abdomen was then insufflated to a pressure of 15 mm Hg. An 8 mm da Madelyn trocar was then inserted at this site. The camera was then inserted to confirm proper placement. Three additional 8 mm da Madelyn trocars were then placed under direct visualization, two on the right, and one on the left. These were placed approximately 8 cm apart across the upper abdomen, except on the left, where it was place about 10-12 cm lateral to the midline trocar. An 8 mm Airseal assistant port was then placed in the left upper abdomen, a few centimeters cephalad and centered between the midline and  lateral da Madelyn trocars. Positioning of the trocars in the abdominal wall were then adjusted to locate the point of articulation at the level of the fascia. The patient was then placed in steep Trendelenberg position. The [employer] robot was then moved to the bedside and targeted over the patient. The arms were moved into position. The #2 port was then docked and the camera was inserted and advanced. The remaining trocars were then docked with the [employer].   A fenestrated bipolar grasper was placed in arm #1, monopolar scissors placed in arm #3, and a Prograsp placed in arm #4. I then removed my gown and made my way to the console. The abdomen and pelvis were then examined, with the above mentioned findings noted. We then proceeded with the dissection. Using the Pat [employer] Alannah with monopolar cautery, an incision was made overlying the fundal fibroid from anterior to posterior. We then grasped the overlying serosa and myometrium on either side and began to dissect it away from the fibroid, using the endoshears and monopolar cautery as needed for hemostasis. Once it was mobilized enough, we then grasped the fibroid and began to place upward traction, while we continued to free it from the myometrium using both sharp and blunt dissection. Once freed, we placed it in an Endocatch bag for later removal.  The bed of dissection was then irrigated and made hemostatic with cautery. The defect was then closed in two layers. The myometrium was first closed with a running 2-0 Vicryl suture. The serosa was then closed over that with a 2-0 Stratafix suture. The uterus was inspected and the fallopian tubes appeared to be unharmed and normal in appearance. We then approached the much smaller posterior fibroid. This appeared to be subserosal.  Monopolar cautery was used to incise the serosa adjacent to the fibroid. It was the grasped and undermined with a combination of sharp and blunt dissection. Once freed, it was also placed in an Endocatch bag for later removal.  A 2-0 Vicryl was then used to approximate the serosa at this site. The pelvis was then irrigated and the uterus was inspected for hemostasis. No appreciable bleeding was noted from either uterine incision. Surgicel powder was then applied along the uterine incisions for additional hemostasis. We then covered the uterine incision with a piece of Interceed to act as an adhesion barrier.   Saline irrigation was applied to the Interceed in order to help it \"stick\" to the uterus in the desired position. The uterine manipulator was removed. The robot was then undocked from the trocars and pulled away from the table. She was then taken out of Trendelenburg tilt. In order to remove the specimens, we performed a mini-Pfannenstiel incision, about 5 cm wide. Upon entering the peritoneal cavity the Endocatch bags were grasped and delivered through the incision without difficulty. The peritoneum was then closed with a running 2-0 Vicryl suture. The rectus muscles were also approximated with interrupted 2-0 Vicryl sutures. The fascia was then closed with a running 0 Vicryl suture. The subcutaneous tissues was approximated with a 2-0 Vicryl suture. The skin was then closed with a 4-0 Monocryl in a running, subcuticular fashion. The trocars were then removed and the incision sites were closed with a stitch of 4-0 Monocryl, followed by a layer of Dermabond. Dermabond was also applied along the mini-Pfannenstiel incision. The patient was taken out of stirrups, awakened from anesthesia, and taken to the recovery room in stable condition. All instrument, sponge, and needle counts were correct.      Dante Cho MD  11/16/2022  9:53 AM

## 2022-11-16 NOTE — BRIEF OP NOTE
Brief Postoperative Note    Patient: Linda Foster  YOB: 1995  MRN: [id number]    Date of Procedure: 11/16/2022     Pre-Op Diagnosis: UTERINE FIBROIDS    Post-Op Diagnosis: Same as preoperative diagnosis. Procedure(s):  ROBOTIC ASSISTED LAPAROSCOPIC MYOMECTOMY    Surgeon(s):  Oriana Avelar MD    Surgical Assistant: Physician Assistant: Radha Medrano PA-C    Anesthesia: General     Estimated Blood Loss (mL): less than 50     Complications: None    Specimens:   ID Type Source Tests Collected by Time Destination   1 : FUNDAL FIBROID Fresh Uterus  Oriana Avelar MD 11/16/2022 2927 Pathology   2 : posterior fibroid Fresh Uterus  Oriana Avelar MD 11/16/2022 4702 Pathology        Implants: * No implants in log *    Drains: * No LDAs found *    Findings: 5 cm fundal fibroid, slightly more anterior than posterior, and essentially midline. The fallopian tube appeared to travel below and slightly posterior to the fibroid. The fibroid involved the myometrium, but did not reach the endometrium. Smaller fibroid on the posterior aspect of the uterus in the lower fundus, measuring about 2 cm. This fibroid was very superficial and did not appear to involve the myometrium, or at least very minimally. Normal tubes and ovaries bilaterally. Chromotubation unsuccessful due to rupture of the manipulator balloon, which did not occlude the lower uterine segment allowed the methylene blue to drain through the cervix instead of through the tubes.       Electronically Signed by Marvin Laws MD on 11/16/2022 at 9:48 AM

## 2022-11-16 NOTE — ANESTHESIA POSTPROCEDURE EVALUATION
Post-Anesthesia Evaluation and Assessment    Patient: Alisha Andersen MRN: [id number]  SSN: [id number]    YOB: 1995  Age: 28 y.o. Sex: female      I have evaluated the patient and they are stable and ready for discharge from the PACU. Cardiovascular Function/Vital Signs  Visit Vitals  /82   Pulse 79   Temp 36.4 °C (97.5 °F)   Resp 14   Ht 5' 7\" (1.702 m)   Wt 77 kg (169 lb 12.1 oz)   SpO2 100%   BMI 26.59 kg/m²       Patient is status post General anesthesia for Procedure(s):  ROBOTIC ASSISTED LAPAROSCOPIC MYOMECTOMY. Nausea/Vomiting: None    Postoperative hydration reviewed and adequate. Pain:  Pain Scale 1: Numeric (0 - 10) (11/16/22 1246)  Pain Intensity 1: 4 (11/16/22 1246)   Managed    Neurological Status:   Neuro (WDL): Within Defined Limits (11/16/22 1246)  Neuro  Neurologic State: Alert (11/16/22 1246)  Orientation Level: Oriented X4 (11/16/22 1246)  Cognition: Follows commands (11/16/22 1246)  Speech: Clear (11/16/22 1246)  LUE Motor Response: Purposeful (11/16/22 1246)  LLE Motor Response: Purposeful (11/16/22 1246)  RUE Motor Response: Purposeful (11/16/22 1246)  RLE Motor Response: Purposeful (11/16/22 1246)   At baseline    Mental Status, Level of Consciousness: Alert and  oriented to person, place, and time    Pulmonary Status:   O2 Device: None (Room air) (11/16/22 1246)   Adequate oxygenation and airway patent    Complications related to anesthesia: None    Post-anesthesia assessment completed.  No concerns    Signed By: Ian John MD     November 16, 2022

## 2022-11-17 LAB
ANION GAP SERPL CALC-SCNC: 9 MMOL/L (ref 5–15)
BUN SERPL-MCNC: 4 MG/DL (ref 6–20)
BUN/CREAT SERPL: 7 (ref 12–20)
CALCIUM SERPL-MCNC: 7.9 MG/DL (ref 8.5–10.1)
CHLORIDE SERPL-SCNC: 111 MMOL/L (ref 97–108)
CO2 SERPL-SCNC: 20 MMOL/L (ref 21–32)
CREAT SERPL-MCNC: 0.56 MG/DL (ref 0.55–1.02)
ERYTHROCYTE [DISTWIDTH] IN BLOOD BY AUTOMATED COUNT: 13.3 % (ref 11.5–14.5)
GLUCOSE SERPL-MCNC: 94 MG/DL (ref 65–100)
HCT VFR BLD AUTO: 33.4 % (ref 35–47)
HGB BLD-MCNC: 11.2 G/DL (ref 11.5–16)
MCH RBC QN AUTO: 28.4 PG (ref 26–34)
MCHC RBC AUTO-ENTMCNC: 33.5 G/DL (ref 30–36.5)
MCV RBC AUTO: 84.8 FL (ref 80–99)
NRBC # BLD: 0 K/UL (ref 0–0.01)
NRBC BLD-RTO: 0 PER 100 WBC
PLATELET # BLD AUTO: 209 K/UL (ref 150–400)
PMV BLD AUTO: 10 FL (ref 8.9–12.9)
POTASSIUM SERPL-SCNC: 3.8 MMOL/L (ref 3.5–5.1)
RBC # BLD AUTO: 3.94 M/UL (ref 3.8–5.2)
SODIUM SERPL-SCNC: 140 MMOL/L (ref 136–145)
WBC # BLD AUTO: 9 K/UL (ref 3.6–11)

## 2022-11-17 PROCEDURE — 36415 COLL VENOUS BLD VENIPUNCTURE: CPT

## 2022-11-17 PROCEDURE — 80048 BASIC METABOLIC PNL TOTAL CA: CPT

## 2022-11-17 PROCEDURE — 85027 COMPLETE CBC AUTOMATED: CPT

## 2022-11-17 PROCEDURE — 74011250636 HC RX REV CODE- 250/636: Performed by: OBSTETRICS & GYNECOLOGY

## 2022-11-17 PROCEDURE — 74011250637 HC RX REV CODE- 250/637: Performed by: PHYSICIAN ASSISTANT

## 2022-11-17 PROCEDURE — 74011250637 HC RX REV CODE- 250/637: Performed by: OBSTETRICS & GYNECOLOGY

## 2022-11-17 RX ORDER — FAMOTIDINE 20 MG/1
20 TABLET, FILM COATED ORAL 2 TIMES DAILY
Status: DISCONTINUED | OUTPATIENT
Start: 2022-11-17 | End: 2022-11-18 | Stop reason: HOSPADM

## 2022-11-17 RX ORDER — TRAMADOL HYDROCHLORIDE 50 MG/1
50 TABLET ORAL
Status: DISCONTINUED | OUTPATIENT
Start: 2022-11-17 | End: 2022-11-18 | Stop reason: HOSPADM

## 2022-11-17 RX ORDER — SIMETHICONE 80 MG
80 TABLET,CHEWABLE ORAL
Status: DISCONTINUED | OUTPATIENT
Start: 2022-11-17 | End: 2022-11-18 | Stop reason: HOSPADM

## 2022-11-17 RX ORDER — IBUPROFEN 400 MG/1
800 TABLET ORAL
Status: DISCONTINUED | OUTPATIENT
Start: 2022-11-17 | End: 2022-11-18 | Stop reason: HOSPADM

## 2022-11-17 RX ORDER — OXYCODONE HYDROCHLORIDE 5 MG/1
5-10 TABLET ORAL
Status: DISCONTINUED | OUTPATIENT
Start: 2022-11-17 | End: 2022-11-18 | Stop reason: HOSPADM

## 2022-11-17 RX ADMIN — ACETAMINOPHEN 650 MG: 325 TABLET ORAL at 08:28

## 2022-11-17 RX ADMIN — FAMOTIDINE 20 MG: 20 TABLET, FILM COATED ORAL at 20:00

## 2022-11-17 RX ADMIN — Medication 80 MG: at 13:54

## 2022-11-17 RX ADMIN — KETOROLAC TROMETHAMINE 30 MG: 30 INJECTION, SOLUTION INTRAMUSCULAR; INTRAVENOUS at 10:02

## 2022-11-17 RX ADMIN — TRAMADOL HYDROCHLORIDE 50 MG: 50 TABLET ORAL at 18:47

## 2022-11-17 RX ADMIN — SODIUM CHLORIDE 125 ML/HR: 9 INJECTION, SOLUTION INTRAVENOUS at 09:32

## 2022-11-17 RX ADMIN — OXYCODONE 10 MG: 5 TABLET ORAL at 16:07

## 2022-11-17 RX ADMIN — ACETAMINOPHEN 650 MG: 325 TABLET ORAL at 16:06

## 2022-11-17 RX ADMIN — OXYCODONE 10 MG: 5 TABLET ORAL at 12:11

## 2022-11-17 RX ADMIN — OXYCODONE 5 MG: 5 TABLET ORAL at 08:28

## 2022-11-17 RX ADMIN — ACETAMINOPHEN 650 MG: 325 TABLET ORAL at 02:58

## 2022-11-17 RX ADMIN — ACETAMINOPHEN 650 MG: 325 TABLET ORAL at 20:46

## 2022-11-17 RX ADMIN — OXYCODONE 5 MG: 5 TABLET ORAL at 02:58

## 2022-11-17 RX ADMIN — OXYCODONE 10 MG: 5 TABLET ORAL at 20:46

## 2022-11-17 RX ADMIN — KETOROLAC TROMETHAMINE 30 MG: 30 INJECTION, SOLUTION INTRAMUSCULAR; INTRAVENOUS at 04:42

## 2022-11-17 RX ADMIN — IBUPROFEN 800 MG: 400 TABLET, FILM COATED ORAL at 20:01

## 2022-11-17 RX ADMIN — TRAMADOL HYDROCHLORIDE 50 MG: 50 TABLET ORAL at 12:51

## 2022-11-17 RX ADMIN — OXYCODONE 5 MG: 5 TABLET ORAL at 07:11

## 2022-11-17 NOTE — PROGRESS NOTES
TRANSFER - IN REPORT:    Verbal report received from Ernie Jacome RN(name) on Ria Romero  being received from [employer]) for routine post - op      Report consisted of patients Situation, Background, Assessment and   Recommendations(SBAR). Information from the following report(s) SBAR, Kardex, OR Summary, Procedure Summary, Intake/Output, and MAR was reviewed with the receiving nurse. Opportunity for questions and clarification was provided. Assessment completed upon patients arrival to unit and care assumed. At approx 1830, this RN along with FUNMI Scott RN assisted pt to bathroom. Pt voided approx 50cc, reports urge to void. Additional attempt to void post walk unsuccessful. Bladder scan showed residual volume of 112cc. Dr. Keshia Gordillo contacted - orders received to give pt additional time to void or to place dior if pt wishes to have one.

## 2022-11-17 NOTE — PROGRESS NOTES
Xander Fulton provided to patient/representative with verbal explanation of the notice. Time allotted for questions regarding the notice. Patient /representative provided a completed copy of the VOON notice. Copy placed on bedside chart.   Sneha Abreu, Care Management Assistant

## 2022-11-17 NOTE — PROGRESS NOTES
A Spiritual Care Partner Volunteer visited patient in Room 305 on 11/17/2022.   Documented by:  Chaplain Antoine MDiv, MS, [facility]

## 2022-11-18 VITALS
BODY MASS INDEX: 26.64 KG/M2 | TEMPERATURE: 97.8 F | OXYGEN SATURATION: 97 % | RESPIRATION RATE: 16 BRPM | DIASTOLIC BLOOD PRESSURE: 81 MMHG | HEART RATE: 75 BPM | SYSTOLIC BLOOD PRESSURE: 123 MMHG | WEIGHT: 169.75 LBS | HEIGHT: 67 IN

## 2022-11-18 PROCEDURE — 74011250637 HC RX REV CODE- 250/637: Performed by: OBSTETRICS & GYNECOLOGY

## 2022-11-18 PROCEDURE — 74011250637 HC RX REV CODE- 250/637: Performed by: PHYSICIAN ASSISTANT

## 2022-11-18 RX ORDER — IBUPROFEN 800 MG/1
800 TABLET ORAL
Qty: 40 TABLET | Refills: 0 | Status: SHIPPED | OUTPATIENT
Start: 2022-11-18

## 2022-11-18 RX ORDER — TRAMADOL HYDROCHLORIDE 50 MG/1
50 TABLET ORAL
Qty: 20 TABLET | Refills: 0 | Status: SHIPPED | OUTPATIENT
Start: 2022-11-18 | End: 2022-11-23

## 2022-11-18 RX ORDER — OXYCODONE HYDROCHLORIDE 10 MG/1
5-10 TABLET ORAL
Qty: 30 TABLET | Refills: 0 | Status: SHIPPED | OUTPATIENT
Start: 2022-11-18 | End: 2022-11-23

## 2022-11-18 RX ADMIN — ACETAMINOPHEN 650 MG: 325 TABLET ORAL at 03:10

## 2022-11-18 RX ADMIN — TRAMADOL HYDROCHLORIDE 50 MG: 50 TABLET ORAL at 09:53

## 2022-11-18 RX ADMIN — ACETAMINOPHEN 650 MG: 325 TABLET ORAL at 09:53

## 2022-11-18 RX ADMIN — FAMOTIDINE 20 MG: 20 TABLET, FILM COATED ORAL at 09:53

## 2022-11-18 RX ADMIN — Medication 80 MG: at 00:04

## 2022-11-18 RX ADMIN — TRAMADOL HYDROCHLORIDE 50 MG: 50 TABLET ORAL at 00:04

## 2022-11-18 RX ADMIN — OXYCODONE 5 MG: 5 TABLET ORAL at 06:24

## 2022-11-18 RX ADMIN — IBUPROFEN 800 MG: 400 TABLET, FILM COATED ORAL at 06:24

## 2022-11-18 NOTE — PROGRESS NOTES
Bedside and Verbal shift change report given to [address]  (oncoming nurse) by Darien Kovacs RN (offgoing nurse). Report included the following information SBAR, Kardex, and MAR.          6868 - pt said she saturated a whole pad since the last pad change which was last night. Passed a nickel-sized clot. Will notify MD upon rounds.

## 2022-11-18 NOTE — DISCHARGE SUMMARY
[facility]YNECOLOG[facility]COL[facility]  A department of DOCTORS [address], [address], [address]  [phone] PDA [phone]        Discharge Summary  Patient ID:  Lobito An  [id number]  28 y.o.  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 She has a dominant uterine fibroid in the uterine fundus which has grown in size over the last few years. Her periods are heavy and painful. She presented to the clinic for myomectomy. She was admitted and underwent the above procedures. See op note for details, but briefly     5 cm fundal fibroid, slightly more anterior than posterior, and essentially midline. The fallopian tube appeared to travel below and slightly posterior to the fibroid. The fibroid involved the myometrium, but did not reach the endometrium. Smaller fibroid on the posterior aspect of the uterus in the lower fundus, measuring about 2 cm. This fibroid was very superficial and did not appear to involve the myometrium, or at least very minimally. Normal tubes and ovaries bilaterally.   Chromotubation unsuccessful due to rupture of the manipulator balloon, which did not occlude the lower uterine segment allowed the methylene blue to drain through the cervix instead of through the tubes.     She convalesced postop without complication with diet advanced as GI function returned, voiding, ambulating and pain controlled. She was deemed ready for release on POD 2 afebrile with a benign abd and wound. Followup, meds and education provided as below. Pathology: pending    Consults: None    Significant Diagnostic Studies:  Lab Results   Component Value Date/Time    WBC 9.0 11/17/2022 06:20 AM    ABS. NEUTROPHILS 1.4 (L) 11/09/2022 03:30 PM    HGB 11.2 (L) 11/17/2022 06:20 AM    HCT 33.4 (L) 11/17/2022 06:20 AM    MCV 84.8 11/17/2022 06:20 AM    MCH 28.4 11/17/2022 06:20 AM    PLATELET 798 49/98/8606 06:20 AM     Lab Results   Component Value Date/Time    Sodium 140 11/17/2022 06:20 AM    Potassium 3.8 11/17/2022 06:20 AM    Chloride 111 (H) 11/17/2022 06:20 AM    CO2 20 (L) 11/17/2022 06:20 AM    Glucose 94 11/17/2022 06:20 AM    BUN 4 (L) 11/17/2022 06:20 AM    Creatinine 0.56 11/17/2022 06:20 AM    Calcium 7.9 (L) 11/17/2022 06:20 AM    Albumin 3.8 11/09/2022 03:30 PM    Bilirubin, total 0.5 11/09/2022 03:30 PM    ALT (SGPT) 35 11/09/2022 03:30 PM    Alk. phosphatase 44 (L) 11/09/2022 03:30 PM       Disposition: home    Patient Instructions:   Discharge Medication List as of 11/18/2022 11:07 AM        START taking these medications    Details   oxyCODONE IR (ROXICODONE) 10 mg tab immediate release tablet Take 0.5-1 Tablets by mouth every four (4) hours as needed for Pain for up to 5 days. Max Daily Amount: 60 mg., Normal, Disp-30 Tablet, R-0      traMADoL (ULTRAM) 50 mg tablet Take 1 Tablet by mouth every six (6) hours as needed for Pain for up to 5 days. Max Daily Amount: 200 mg., Normal, Disp-20 Tablet, R-0      ibuprofen (MOTRIN) 800 mg tablet Take 1 Tablet by mouth every six (6) hours as needed for Pain., Normal, Disp-40 Tablet, R-0           CONTINUE these medications which have NOT CHANGED    Details   linaCLOtide (Linzess) 145 mcg cap capsule Take 1 Capsule by mouth daily. , Normal, Disp-90 Capsule, R-2      norethindrone-ethinyl estradiol (MICROGESTIN 1/20) 1-20 mg-mcg tablet Junel 1/20 (21) 1 mg-20 mcg tablet   Take by oral route., Historical Med           Activity: no driving for 2 weeks and/or while on analgesics, no heavy lifting for 6 weeks. Nothing per vagina for 8 weeks  Walk four or more times daily  Showers okay, no tub bathing for 2 weeks if surgery  Stool softner for constipation  Diet: Regular Diet and Encourage fluids  Wound Care: Keep wound clean and dry, Reinforce dressing PRN, and Ice to area for comfort    Follow-up with Dr. Donnie Montoya in 4 weeks.     Signed:  Dora Choi PA-C  11/18/2022/2:45 PM

## 2022-11-18 NOTE — PROGRESS NOTES
[address] department of DOCTORS [address], [address]  P [phone]  F [phone]       Patient Name: Doyle Read Date: 11/16/2022   OR Date: 11/16/2022   Visit Date: 11/18/2022        SUBJECTIVE:  Feeling better this morning. Pain under better control. Has been up ambulating.    + flatus which has also helped her pain. Tolerating diet. OBJECTIVE:    Patient Vitals for the past 24 hrs:   Temp Pulse Resp BP SpO2   11/18/22 0747 97.8 °F (36.6 °C) 75 16 123/81 97 %   11/18/22 0400 97.8 °F (36.6 °C) 75 16 130/87 99 %   11/17/22 2018 98 °F (36.7 °C) 76 16 129/81 98 %   11/17/22 1651 97.8 °F (36.6 °C) 80 16 120/86 99 %   11/17/22 1322 98.8 °F (37.1 °C) 84 16 123/79 98 %         Date 11/17/22 0700 - 11/18/22 0659 11/18/22 0700 - 11/19/22 0659   Shift 0763-3253 9021-3354 24 Hour Total 9450-2905 6478-9206 24 Hour Total   INTAKE   Shift Total(mL/kg)         OUTPUT   Urine(mL/kg/hr) 500(0.5) 150(0.2) 650(0.4) 200  200     Urine Voided 300 150 450 200  200     Urine Output (mL) ([REMOVED] Urinary Catheter 11/16/22 2- way) 200  200      Shift Total(mL/kg) 500(6.5) 150(1.9) 650(8.4) 200(2.6)  200(2.6)   NET -500 -150 -650 -200  -200   Weight (kg) 77 77 77 77 77 77         Physical Exam     General:  alert, cooperative, no distress     Cardiac:  Regular rate and rhythm        Lungs:  clear to auscultation bilaterally  Abdomen:  soft, non-tender, without masses or organomegaly       Wound:  clean, dry   Extremity: extremities normal, atraumatic, no cyanosis or edema    Data Review  Lab Results   Component Value Date/Time    WBC 9.0 11/17/2022 06:20 AM    ABS.  NEUTROPHILS 1.4 (L) 11/09/2022 03:30 PM    HGB 11.2 (L) 11/17/2022 06:20 AM    HCT 33.4 (L) 11/17/2022 06:20 AM    MCV 84.8 11/17/2022 06:20 AM    MCH 28.4 11/17/2022 06:20 AM    PLATELET 373 40/98/9859 06:20 AM     Lab Results   Component Value Date/Time    Sodium 140 11/17/2022 06:20 AM    Potassium 3.8 11/17/2022 06:20 AM    Chloride 111 (H) 11/17/2022 06:20 AM    CO2 20 (L) 11/17/2022 06:20 AM    Glucose 94 11/17/2022 06:20 AM    BUN 4 (L) 11/17/2022 06:20 AM    Creatinine 0.56 11/17/2022 06:20 AM    Calcium 7.9 (L) 11/17/2022 06:20 AM    Albumin 3.8 11/09/2022 03:30 PM    Bilirubin, total 0.5 11/09/2022 03:30 PM    ALT (SGPT) 35 11/09/2022 03:30 PM    Alk. phosphatase 44 (L) 11/09/2022 03:30 PM     IMPRESSION/PLAN:    2 Day Post-Op Procedure(s):  ROBOTIC ASSISTED LAPAROSCOPIC MYOMECTOMY for UTERINE FIBROIDS    Oncologic:  29 yo BF with symptomatic uterine fibroids. She has a dominant uterine fibroid in the uterine fundus which has grown in size over the last few years. Her periods are heavy and painful. She came to me for myomectomy. Heme/CV:  VSS. Hemodynamically stable. Renal:  Good urine output. Has been able to void. FEN/GI:  No nausea. Tolerating regular diet. ID/Wound:  Afebrile. Incisions and abdomen benign   PPX:  SCDs. Incentive spirometer. Encourage OOB   Dispostion:  Doing well postoperatively. Continue routine post op care. Pain much better this morning. Managed with oxycodone, tramadol and motrin. [id number] Sabiha Magdaleno for discharge home. Follow up with Dr. Agapito Rodriguez in 4 weeks.         Loura Apley, PA-C  11/18/2022  9:02 AM

## 2022-11-18 NOTE — PROGRESS NOTES
0745: Bedside and Verbal shift change report given to JAMILA (oncoming nurse) by NAKUL Baron (offgoing nurse). Report included the following information SBAR, Kardex, Intake/Output, MAR, Accordion, Recent Results, and Med Rec Status. 1204: I have reviewed discharge instructions with the patient. The patient verbalized understanding. Discharge medications reviewed with patient and appropriate educational materials and side effects teaching were provided.

## 2022-11-18 NOTE — DISCHARGE INSTRUCTIONS
[address] [facility] [address], [address], [address]  P [phone]  F [phone]       Anamika Jones      Dear Ms. Braden Cortes,      Please review your instructions with your nurse and ask any questions so you have all the information you need to recover well at home. If you do not feel you have everything you need to succeed and be safe after you leave the hospital, please discuss these concerns with your nurse. As always, call for any questions at home. Your doctor: Yen Gaytan  Diagnosis: Leiomyoma of uterus [D25.9]  Procedure: Myomectomy  Date of Discharge: 11/18/22       Take Home Medications     See Discharge Medication Review provided to you by your nurse. If you did not receive one, request this prior to your discharge. It is important that you take your medications as they are prescribed. Your prescriptions are sent to your pharmacy on record. Keep your medications in the bottles provided by the pharmacist and keep a list of the medication names, dosages, times to be taken and what they are for in your wallet. Do not take other medications without consulting your doctor. You may take Tylenol and Ibuprofen or Aleve for pain. If this is not sufficient to control your pain, Tramadol or another pain medication will be prescribed for you. You should take a daily gentle stool softener such as a colace pill or dulcolax suppository for constipation as this is not uncommon after surgery and/or while on pain medication. If not prescribed, this can be found over the counter. If constipation persists for >24 hours you should take a dose of Milk of Magnesia. Call if your constipation continues. Diet    Stay hydrated and drink fluids such as gatorade and water. This will also help prevent constipation and dehydration.  Limit any usual caffeine intake such as soda, tea and coffee as these may serve to dehydrate you. For the first 2-3 days following your procedure, keep a low fiber diet avoiding raw vegetables or fruits with skin. Choose a diet consisting of soup, cereal, yogurt, eggs, fish, Boost/Ensure. Avoid fatty/greasy foods. If nauseated, keep your diet limited to liquids and call if this persists. Activity    If possible, have someone with you at all times until you feel stable. Gradually increase your activity each day. There are generally no restrictions on walking, climbing stairs or riding in a car. Walk at least 4 times per day. Walking will help reduce the risk of blood blots, constipation and pneumonia. Davin Ramirez are okay. If you have an incision, no tub bathing/swimming for 3-4 weeks. No swimming or other submerging under water for the same amount of time. No driving for 2 week and/or while on pain medication. If you had surgery, the following restrictions are in place:  No heavy lifting greater than 10 lbs for the first 3 weeks following surgery and then no more than 25 lbs for the next 3 weeks. Incision    You should expect some discomfort in the area of your incision, particularly as you increase your activity. If you notice an area of increasing redness or new drainage, please call your doctor. Many incisions will have buried absorbable sutures, which do not need to be removed and are covered by protective glue. Please allow this glue to come off on its own. Causes For Concern    If any of the following occur, please call our office and speak with the Nurse/aid who will help you with your problem or ask the doctor to call you. Problems with the incision, including increasing pain, swelling, redness or drainage. Inability to pass urine   Increasing abdominal pain despite medication  Persisting nausea or vomiting. Fever or chills and a temperature >101F  Constipation (no bowel movement for three days).    Diarrhea (more than three watery stools within 24 hours). Excessive vaginal or wound bleeding. If after hours and you cannot reach an on-call physician, call 911. Follow-Up    Call [phone] to schedule an appointment with Dr. Wily Ch in 4 week. Information obtained by :  I understand that if any problems occur once I am at home I am to contact my physician. I understand and acknowledge receipt of the instructions indicated above.                                                                                                                                            Physician's or R.N.'s Signature                                                                  Date/Time                                                                                                                                              Patient or Representative Signature                                                          Date/Time

## 2022-11-23 ENCOUNTER — TELEPHONE (OUTPATIENT)
Dept: GYNECOLOGY | Age: 27
End: 2022-11-23

## 2022-11-23 RX ORDER — METHYLPREDNISOLONE 4 MG/1
TABLET ORAL
Qty: 1 DOSE PACK | Refills: 0 | Status: SHIPPED | OUTPATIENT
Start: 2022-11-23 | End: 2023-03-03 | Stop reason: ALTCHOICE

## 2022-12-13 ENCOUNTER — OFFICE VISIT (OUTPATIENT)
Dept: GYNECOLOGY | Age: 27
End: 2022-12-13
Payer: COMMERCIAL

## 2022-12-13 VITALS
SYSTOLIC BLOOD PRESSURE: 125 MMHG | DIASTOLIC BLOOD PRESSURE: 90 MMHG | HEIGHT: 67 IN | HEART RATE: 86 BPM | BODY MASS INDEX: 26.21 KG/M2 | WEIGHT: 167 LBS

## 2022-12-13 DIAGNOSIS — N92.0 MENORRHAGIA WITH REGULAR CYCLE: ICD-10-CM

## 2022-12-13 DIAGNOSIS — D25.1 INTRAMURAL LEIOMYOMA OF UTERUS: Primary | ICD-10-CM

## 2022-12-13 DIAGNOSIS — N94.6 DYSMENORRHEA: ICD-10-CM

## 2022-12-13 DIAGNOSIS — R10.2 PELVIC PAIN: ICD-10-CM

## 2022-12-13 PROCEDURE — 99024 POSTOP FOLLOW-UP VISIT: CPT | Performed by: OBSTETRICS & GYNECOLOGY

## 2022-12-13 NOTE — PROGRESS NOTES
[facility]YN[facility]COL[facility]  [address] [facility], [address], [address] [facility]  P [phone]  F [phone]    Postoperative Office Note  Patient ID:  Name: Ria Romero  MRM: [id number]  : 1995/27 y.o. Date: 2022    Diagnosis:     ICD-10-CM ICD-9-CM    1. Intramural leiomyoma of uterus  D25.1 218.1       2. Pelvic pain  R10.2 [id number]       3. Menorrhagia with regular cycle  N92.0 626.2       4. 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 female who presents for followup postoperative care following a robotic-assisted laparoscopic myomectomy on 22. Operative findings:  5 cm fundal fibroid, slightly more anterior than posterior, and essentially midline. The fallopian tube appeared to travel below and slightly posterior to the fibroid. The fibroid involved the myometrium, but did not reach the endometrium. Smaller fibroid on the posterior aspect of the uterus in the lower fundus, measuring about 2 cm. This fibroid was very superficial and did not appear to involve the myometrium, or at least very minimally. Normal tubes and ovaries bilaterally. Chromotubation unsuccessful due to rupture of the manipulator balloon, which did not occlude the lower uterine segment allowed the methylene blue to drain through the cervix instead of through the tubes. The patient's pathology revealed: FINAL PATHOLOGIC DIAGNOSIS   1. Uterus, fundus, myomectomy:   Leiomyoma, hyalinized and with ischemic changes. 2. Uterus, posterior, myomectomy:   Leiomyoma.        Currently she has no problems with eating, bowel movements, voiding, or their wound. Appetite is good. Eating a regular diet without difficulty. Bowel movements are regular. The patient is not having any pain. Medications:     Current Outpatient Medications on File Prior to Visit   Medication Sig Dispense Refill    ibuprofen (MOTRIN) 800 mg tablet Take 1 Tablet by mouth every six (6) hours as needed for Pain. 40 Tablet 0    linaCLOtide (Linzess) 145 mcg cap capsule Take 1 Capsule by mouth daily. 90 Capsule 2    norethindrone-ethinyl estradiol (MICROGESTIN 1/20) 1-20 mg-mcg tablet Junel 1/20 (21) 1 mg-20 mcg tablet   Take by oral route. methylPREDNISolone (MEDROL DOSEPACK) 4 mg tablet Take daily dose as per instructions on dose pack. (Patient not taking: Reported on 12/13/2022) 1 Dose Pack 0     No current facility-administered medications on file prior to visit. Allergies:      Allergies   Allergen Reactions    Chlorhexidine Contact Dermatitis, Itching and Rash     Past Medical History:   Diagnosis Date    Anemia NEC     Asthma     EXERCISE AND WEATHER    Herpes 03/2013    Migraine     Migraine     Other ill-defined conditions(799.89)     Left breast mass    Reactive airway disease     exercise induced     Past Surgical History:   Procedure Laterality Date    HX CARPAL TUNNEL RELEASE Left 05/2020    HX ORTHOPAEDIC Left     CARPAL TUNNEL RELEASE    KY BREAST SURGERY PROCEDURE UNLISTED      BENIGN MASSES REMOVED-LEFT      Social History     Socioeconomic History    Marital status: SINGLE     Spouse name: Not on file    Number of children: Not on file    Years of education: Not on file    Highest education level: Not on file   Occupational History    Not on file   Tobacco Use    Smoking status: Never    Smokeless tobacco: Never   Vaping Use    Vaping Use: Never used   Substance and Sexual Activity    Alcohol use: Not Currently     Comment: 5 DRINKS WEEKLY    Drug use: No    Sexual activity: Yes     Partners: Male     Birth control/protection: Pill   Other Topics Concern    Not on file   Social History Narrative    Not on file     Social Determinants of Health     Financial Resource Strain: Not on file   Food Insecurity: Not on file   Transportation Needs: Not on file   Physical Activity: Not on file   Stress: Not on file   Social Connections: Not on file   Intimate Partner Violence: Not on file   Housing Stability: Not on file       OBJECTIVE:    Vitals:   Vitals:    12/13/22 1417   BP: (!) 125/90   Pulse: 86   Weight: 167 lb (75.8 kg)   Height: 5' 7.01\" (1.702 m)     Physical Examination:     General:  alert, cooperative, no distress   Lungs: lungs clear to auscultation   Cardiac: Regular rate and rhythm   Abdomen: soft, bowel sounds active, non-tender  No CVA tenderness   Incision: healing well   Pelvic: Deferred   Rectal: Deferred   Extremity:   extremities normal, atraumatic, no cyanosis or edema     IMPRESSION:    Kylee Cortes is doing well postoperatively. She has no apparent complications from surgery. 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 Implications of diagnosis discussed at length. All questions answered. The patient may return to all of her normal activities without restrictions at this time. I will see the patient back prn. The patient is advised to call our office with any problems or concerns. An electronic signature was used to sign this note.     Yen Gaytan MD  12/13/2022

## 2023-02-08 NOTE — TELEPHONE ENCOUNTER
Requested Prescriptions     Pending Prescriptions Disp Refills    linaCLOtide (Linzess) 145 mcg cap capsule 90 Capsule 2     Sig: Take 1 Capsule by mouth daily.

## 2023-03-03 ENCOUNTER — OFFICE VISIT (OUTPATIENT)
Dept: INTERNAL MEDICINE CLINIC | Age: 28
End: 2023-03-03

## 2023-03-03 VITALS
BODY MASS INDEX: 26.2 KG/M2 | DIASTOLIC BLOOD PRESSURE: 78 MMHG | TEMPERATURE: 97.8 F | RESPIRATION RATE: 16 BRPM | HEART RATE: 78 BPM | WEIGHT: 163 LBS | OXYGEN SATURATION: 97 % | HEIGHT: 66 IN | SYSTOLIC BLOOD PRESSURE: 118 MMHG

## 2023-03-03 DIAGNOSIS — Z00.00 ANNUAL PHYSICAL EXAM: Primary | ICD-10-CM

## 2023-03-03 DIAGNOSIS — K58.1 IRRITABLE BOWEL SYNDROME WITH CONSTIPATION: ICD-10-CM

## 2023-03-03 DIAGNOSIS — D25.9 UTERINE LEIOMYOMA, UNSPECIFIED LOCATION: ICD-10-CM

## 2023-03-03 PROBLEM — G56.02 LEFT CARPAL TUNNEL SYNDROME: Status: ACTIVE | Noted: 2020-05-27

## 2023-03-03 RX ORDER — LEVONORGESTREL AND ETHINYL ESTRADIOL AND ETHINYL ESTRADIOL 150-30(84)
KIT ORAL
COMMUNITY
Start: 2023-01-04

## 2023-03-03 NOTE — PROGRESS NOTES
Assessment/Plan:     1. Annual physical exam  -fasting labs today.   -up to date with gyn care    - CBC WITH AUTOMATED DIFF  - METABOLIC PANEL, COMPREHENSIVE  - LIPID PANEL  - URINALYSIS W/ REFLEX CULTURE    2. Irritable bowel syndrome with constipation  -using linzess 145mg daily to help control her constipation. 3. Uterine leiomyoma, unspecified location  -had myomectomy in 11/2022.   -on OCP daily  -still has menorhhagia, not as much as previous. -following with gyn and gyn onc. Orders Placed This Encounter    CBC WITH AUTOMATED DIFF    METABOLIC PANEL, COMPREHENSIVE    LIPID PANEL    URINALYSIS W/ REFLEX CULTURE    Ashlyna 0.15 mg-30 mcg (84)/10 mcg (7) 3MPk        Follow-up Disposition:     Follow up yearly       Subjective:      Kylee Clark is a 33 y.o. female who presents today for her annual physical.     Gyn care is provided by Dr. Carlyle Johnson. - last visit was 8/2022    Exercise -  treadmill in office. Since last visit :  -had lap myomectomy with Dr. Dominick Barragan on 11/16/2022. Still having daily bleeding. Not as heavy. Taking ocp as well.     -has lost about 30 pounds in the last year. Care Team:  -George Barragan, gyn surgeon    Current Outpatient Medications   Medication Sig Dispense Refill    Linzess 145 mcg cap capsule TAKE ONE CAPSULE BY MOUTH ONE TIME DAILY 90 Capsule 0    Ashlyna 0.15 mg-30 mcg (84)/10 mcg (7) 3MPk           Review of Systems    Pertinent items are noted in HPI. Objective:      Wt Readings from Last 3 Encounters:   12/13/22 167 lb (75.8 kg)   11/16/22 169 lb 12.1 oz (77 kg)   11/09/22 169 lb 12.1 oz (77 kg)     BP Readings from Last 3 Encounters:   12/13/22 (!) 125/90   11/18/22 123/81   11/09/22 117/79     Visit Vitals  /78   Pulse 78   Temp 97.8 °F (36.6 °C) (Temporal)   Resp 16   Ht 5' 6\" (1.676 m)   Wt 163 lb (73.9 kg)   LMP 11/16/2022   SpO2 97%   BMI 26.31 kg/m²     General appearance: alert, cooperative, no distress, appears stated age  Head: Normocephalic, without obvious abnormality, atraumatic  Eyes: negative  Ears: normal TM's and external ear canals AU  Throat: Lips, mucosa, and tongue normal. Teeth and gums normal and normal findings: oropharynx pink & moist without lesions or evidence of thrush  Neck: supple, symmetrical, trachea midline, no adenopathy, no carotid bruit, and no JVD  Lungs: clear to auscultation bilaterally  Breasts: normal appearance, no masses or tenderness  Heart: regular rate and rhythm, S1, S2 normal, no murmur, click, rub or gallop  Abdomen: soft, non-tender. Bowel sounds normal. No masses,  no organomegaly  Extremities: extremities normal, atraumatic, no cyanosis or edema  Pulses: 2+ and symmetric  Neurologic: Alert and oriented X 3, normal strength and tone. Normal symmetric reflexes. Normal coordination and gait                Return if symptoms worsen or fail to improve. Advised patient to call back or return to office if symptoms worsen/change/persist.     Discussed expected course/resolution/complications of diagnosis in detail with patient. Medication risks/benefits/costs/interactions/alternatives discussed with patient. Patient was given an after visit summary which includes diagnoses, current medications, & vitals. Patient expressed understanding with the diagnosis and plan.

## 2023-03-03 NOTE — PROGRESS NOTES
Verified name and birth date for privacy precautions. Chart reviewed in preparation for today's visit. Chief Complaint   Patient presents with    Complete Physical          Health Maintenance Due   Topic    Pap Smear     COVID-19 Vaccine (4 - Booster for Pfizer series)    Flu Vaccine (1)         Wt Readings from Last 3 Encounters:   03/03/23 163 lb (73.9 kg)   12/13/22 167 lb (75.8 kg)   11/16/22 169 lb 12.1 oz (77 kg)     Temp Readings from Last 3 Encounters:   03/03/23 97.8 °F (36.6 °C) (Temporal)   11/18/22 97.8 °F (36.6 °C)   11/09/22 98.2 °F (36.8 °C)     BP Readings from Last 3 Encounters:   03/03/23 118/78   12/13/22 (!) 125/90   11/18/22 123/81     Pulse Readings from Last 3 Encounters:   03/03/23 78   12/13/22 86   11/18/22 75         Learning Assessment:  :     Learning Assessment 4/20/2018 1/6/2015   PRIMARY LEARNER Patient Patient   HIGHEST LEVEL OF EDUCATION - PRIMARY LEARNER  4 YEARS OF COLLEGE 2 YEARS OF COLLEGE   BARRIERS PRIMARY LEARNER NONE NONE   CO-LEARNER CAREGIVER No No   PRIMARY LANGUAGE ENGLISH ENGLISH   LEARNER PREFERENCE PRIMARY VIDEOS DEMONSTRATION     READING -   ANSWERED BY patient Patient   RELATIONSHIP SELF SELF       Depression Screening:  :     3 most recent PHQ Screens 3/3/2023   Little interest or pleasure in doing things Not at all   Feeling down, depressed, irritable, or hopeless Not at all   Total Score PHQ 2 0       Fall Risk Assessment:  :     No flowsheet data found. Abuse Screening:  :     Abuse Screening Questionnaire 3/3/2023 5/31/2022 1/31/2018   Do you ever feel afraid of your partner? N N N   Are you in a relationship with someone who physically or mentally threatens you? N N N   Is it safe for you to go home?  Eddy Hedrick

## 2023-05-17 RX ORDER — LINACLOTIDE 145 UG/1
CAPSULE, GELATIN COATED ORAL
Qty: 90 CAPSULE | Refills: 0 | OUTPATIENT
Start: 2023-05-17

## 2023-07-26 ENCOUNTER — TELEPHONE (OUTPATIENT)
Dept: INTERNAL MEDICINE CLINIC | Age: 28
End: 2023-07-26

## 2023-07-26 ENCOUNTER — TELEPHONE (OUTPATIENT)
Age: 28
End: 2023-07-26

## 2023-07-27 LAB
ALBUMIN SERPL-MCNC: 3.9 G/DL (ref 3.5–5)
ALBUMIN/GLOB SERPL: 1.2 (ref 1.1–2.2)
ALP SERPL-CCNC: 40 U/L (ref 45–117)
ALT SERPL-CCNC: 34 U/L (ref 12–78)
AMORPH CRY URNS QL MICRO: ABNORMAL
ANION GAP SERPL CALC-SCNC: 4 MMOL/L (ref 5–15)
APPEARANCE UR: ABNORMAL
AST SERPL-CCNC: 19 U/L (ref 15–37)
BACTERIA URNS QL MICRO: NEGATIVE /HPF
BASOPHILS # BLD: 0 K/UL (ref 0–0.1)
BASOPHILS NFR BLD: 1 % (ref 0–1)
BILIRUB SERPL-MCNC: 0.3 MG/DL (ref 0.2–1)
BILIRUB UR QL: NEGATIVE
BUN SERPL-MCNC: 15 MG/DL (ref 6–20)
BUN/CREAT SERPL: 26 (ref 12–20)
CALCIUM SERPL-MCNC: 9.1 MG/DL (ref 8.5–10.1)
CHLORIDE SERPL-SCNC: 110 MMOL/L (ref 97–108)
CHOLEST SERPL-MCNC: 152 MG/DL
CO2 SERPL-SCNC: 24 MMOL/L (ref 21–32)
COLOR UR: ABNORMAL
CREAT SERPL-MCNC: 0.58 MG/DL (ref 0.55–1.02)
DIFFERENTIAL METHOD BLD: ABNORMAL
EOSINOPHIL # BLD: 0.1 K/UL (ref 0–0.4)
EOSINOPHIL NFR BLD: 2 % (ref 0–7)
EPITH CASTS URNS QL MICRO: ABNORMAL /LPF
ERYTHROCYTE [DISTWIDTH] IN BLOOD BY AUTOMATED COUNT: 12.7 % (ref 11.5–14.5)
GLOBULIN SER CALC-MCNC: 3.2 G/DL (ref 2–4)
GLUCOSE SERPL-MCNC: 85 MG/DL (ref 65–100)
GLUCOSE UR STRIP.AUTO-MCNC: NEGATIVE MG/DL
HCT VFR BLD AUTO: 41.4 % (ref 35–47)
HDLC SERPL-MCNC: 85 MG/DL
HDLC SERPL: 1.8 (ref 0–5)
HGB BLD-MCNC: 12.7 G/DL (ref 11.5–16)
HGB UR QL STRIP: ABNORMAL
IMM GRANULOCYTES # BLD AUTO: 0 K/UL (ref 0–0.04)
IMM GRANULOCYTES NFR BLD AUTO: 0 % (ref 0–0.5)
KETONES UR QL STRIP.AUTO: NEGATIVE MG/DL
LDLC SERPL CALC-MCNC: 61 MG/DL (ref 0–100)
LEUKOCYTE ESTERASE UR QL STRIP.AUTO: NEGATIVE
LYMPHOCYTES # BLD: 1.9 K/UL (ref 0.8–3.5)
LYMPHOCYTES NFR BLD: 55 % (ref 12–49)
MCH RBC QN AUTO: 27.1 PG (ref 26–34)
MCHC RBC AUTO-ENTMCNC: 30.7 G/DL (ref 30–36.5)
MCV RBC AUTO: 88.5 FL (ref 80–99)
MONOCYTES # BLD: 0.4 K/UL (ref 0–1)
MONOCYTES NFR BLD: 10 % (ref 5–13)
NEUTS SEG # BLD: 1.1 K/UL (ref 1.8–8)
NEUTS SEG NFR BLD: 32 % (ref 32–75)
NITRITE UR QL STRIP.AUTO: NEGATIVE
NRBC # BLD: 0 K/UL (ref 0–0.01)
NRBC BLD-RTO: 0 PER 100 WBC
PH UR STRIP: 5.5 (ref 5–8)
PLATELET # BLD AUTO: 268 K/UL (ref 150–400)
PMV BLD AUTO: 10.4 FL (ref 8.9–12.9)
POTASSIUM SERPL-SCNC: 4.1 MMOL/L (ref 3.5–5.1)
PROT SERPL-MCNC: 7.1 G/DL (ref 6.4–8.2)
PROT UR STRIP-MCNC: NEGATIVE MG/DL
RBC # BLD AUTO: 4.68 M/UL (ref 3.8–5.2)
RBC #/AREA URNS HPF: ABNORMAL /HPF (ref 0–5)
SODIUM SERPL-SCNC: 138 MMOL/L (ref 136–145)
SP GR UR REFRACTOMETRY: 1.02 (ref 1–1.03)
TRIGL SERPL-MCNC: 30 MG/DL
URINE CULTURE IF INDICATED: ABNORMAL
UROBILINOGEN UR QL STRIP.AUTO: 0.2 EU/DL (ref 0.2–1)
VLDLC SERPL CALC-MCNC: 6 MG/DL
WBC # BLD AUTO: 3.5 K/UL (ref 3.6–11)
WBC URNS QL MICRO: ABNORMAL /HPF (ref 0–4)

## 2024-01-06 ENCOUNTER — HOSPITAL ENCOUNTER (EMERGENCY)
Facility: HOSPITAL | Age: 29
Discharge: HOME | End: 2024-01-06
Attending: STUDENT IN AN ORGANIZED HEALTH CARE EDUCATION/TRAINING PROGRAM
Payer: COMMERCIAL

## 2024-01-06 ENCOUNTER — APPOINTMENT (OUTPATIENT)
Facility: HOSPITAL | Age: 29
End: 2024-01-06
Payer: COMMERCIAL

## 2024-01-06 VITALS
TEMPERATURE: 98.7 F | HEART RATE: 100 BPM | RESPIRATION RATE: 16 BRPM | SYSTOLIC BLOOD PRESSURE: 145 MMHG | DIASTOLIC BLOOD PRESSURE: 70 MMHG | WEIGHT: 162 LBS | OXYGEN SATURATION: 100 % | BODY MASS INDEX: 25.43 KG/M2 | HEIGHT: 67 IN

## 2024-01-06 DIAGNOSIS — M51.36 BULGING LUMBAR DISC: Primary | ICD-10-CM

## 2024-01-06 DIAGNOSIS — S39.012A STRAIN OF LUMBAR REGION, INITIAL ENCOUNTER: ICD-10-CM

## 2024-01-06 DIAGNOSIS — M54.50 ACUTE RIGHT-SIDED LOW BACK PAIN WITHOUT SCIATICA: ICD-10-CM

## 2024-01-06 LAB
APPEARANCE UR: ABNORMAL
BACTERIA URNS QL MICRO: NEGATIVE /HPF
BILIRUB UR QL CFM: NEGATIVE
COLOR UR: ABNORMAL
EPITH CASTS URNS QL MICRO: ABNORMAL /LPF
GLUCOSE UR STRIP.AUTO-MCNC: NEGATIVE MG/DL
HCG UR QL: NEGATIVE
HGB UR QL STRIP: ABNORMAL
KETONES UR QL STRIP.AUTO: NEGATIVE MG/DL
LEUKOCYTE ESTERASE UR QL STRIP.AUTO: ABNORMAL
NITRITE UR QL STRIP.AUTO: NEGATIVE
PH UR STRIP: 8.5 (ref 5–8)
PROT UR STRIP-MCNC: 30 MG/DL
RBC #/AREA URNS HPF: ABNORMAL /HPF (ref 0–5)
SP GR UR REFRACTOMETRY: 1.03 (ref 1–1.03)
SPECIMEN HOLD: NORMAL
UROBILINOGEN UR QL STRIP.AUTO: 0.2 EU/DL (ref 0.2–1)
WBC URNS QL MICRO: ABNORMAL /HPF (ref 0–4)

## 2024-01-06 PROCEDURE — 6360000002 HC RX W HCPCS

## 2024-01-06 PROCEDURE — 74176 CT ABD & PELVIS W/O CONTRAST: CPT

## 2024-01-06 PROCEDURE — 96372 THER/PROPH/DIAG INJ SC/IM: CPT

## 2024-01-06 PROCEDURE — 6370000000 HC RX 637 (ALT 250 FOR IP)

## 2024-01-06 PROCEDURE — 81025 URINE PREGNANCY TEST: CPT

## 2024-01-06 PROCEDURE — 81001 URINALYSIS AUTO W/SCOPE: CPT

## 2024-01-06 PROCEDURE — 99284 EMERGENCY DEPT VISIT MOD MDM: CPT

## 2024-01-06 RX ORDER — KETOROLAC TROMETHAMINE 30 MG/ML
15 INJECTION, SOLUTION INTRAMUSCULAR; INTRAVENOUS ONCE
Status: COMPLETED | OUTPATIENT
Start: 2024-01-06 | End: 2024-01-06

## 2024-01-06 RX ORDER — METHOCARBAMOL 750 MG/1
750 TABLET, FILM COATED ORAL EVERY 6 HOURS PRN
Qty: 16 TABLET | Refills: 0 | Status: SHIPPED | OUTPATIENT
Start: 2024-01-06 | End: 2024-01-06

## 2024-01-06 RX ORDER — METHOCARBAMOL 750 MG/1
750 TABLET, FILM COATED ORAL EVERY 6 HOURS PRN
Qty: 16 TABLET | Refills: 0 | Status: SHIPPED | OUTPATIENT
Start: 2024-01-06 | End: 2024-01-10

## 2024-01-06 RX ORDER — OXYCODONE HYDROCHLORIDE AND ACETAMINOPHEN 5; 325 MG/1; MG/1
1 TABLET ORAL EVERY 6 HOURS PRN
Qty: 12 TABLET | Refills: 0 | Status: SHIPPED | OUTPATIENT
Start: 2024-01-06 | End: 2024-01-09

## 2024-01-06 RX ORDER — METHYLPREDNISOLONE 4 MG/1
TABLET ORAL
Qty: 21 TABLET | Refills: 0 | Status: SHIPPED | OUTPATIENT
Start: 2024-01-06

## 2024-01-06 RX ORDER — DIAZEPAM 5 MG/1
5 TABLET ORAL ONCE
Status: COMPLETED | OUTPATIENT
Start: 2024-01-06 | End: 2024-01-06

## 2024-01-06 RX ORDER — OXYCODONE HYDROCHLORIDE AND ACETAMINOPHEN 5; 325 MG/1; MG/1
1 TABLET ORAL EVERY 6 HOURS PRN
Qty: 12 TABLET | Refills: 0 | Status: SHIPPED | OUTPATIENT
Start: 2024-01-06 | End: 2024-01-06

## 2024-01-06 RX ORDER — METHYLPREDNISOLONE 4 MG/1
TABLET ORAL
Qty: 21 TABLET | Refills: 0 | Status: SHIPPED | OUTPATIENT
Start: 2024-01-06 | End: 2024-01-06

## 2024-01-06 RX ORDER — OXYCODONE HYDROCHLORIDE AND ACETAMINOPHEN 5; 325 MG/1; MG/1
1 TABLET ORAL
Status: COMPLETED | OUTPATIENT
Start: 2024-01-06 | End: 2024-01-06

## 2024-01-06 RX ADMIN — DIAZEPAM 5 MG: 5 TABLET ORAL at 16:44

## 2024-01-06 RX ADMIN — KETOROLAC TROMETHAMINE 15 MG: 30 INJECTION INTRAMUSCULAR; INTRAVENOUS at 16:43

## 2024-01-06 RX ADMIN — OXYCODONE HYDROCHLORIDE AND ACETAMINOPHEN 1 TABLET: 5; 325 TABLET ORAL at 17:59

## 2024-01-06 ASSESSMENT — PAIN DESCRIPTION - ORIENTATION
ORIENTATION: RIGHT;LEFT;LOWER
ORIENTATION: RIGHT
ORIENTATION: LOWER;RIGHT;LEFT

## 2024-01-06 ASSESSMENT — PAIN DESCRIPTION - DESCRIPTORS: DESCRIPTORS: DISCOMFORT

## 2024-01-06 ASSESSMENT — PAIN DESCRIPTION - PAIN TYPE: TYPE: ACUTE PAIN

## 2024-01-06 ASSESSMENT — PAIN SCALES - GENERAL
PAINLEVEL_OUTOF10: 7

## 2024-01-06 ASSESSMENT — LIFESTYLE VARIABLES
HOW MANY STANDARD DRINKS CONTAINING ALCOHOL DO YOU HAVE ON A TYPICAL DAY: PATIENT DOES NOT DRINK
HOW OFTEN DO YOU HAVE A DRINK CONTAINING ALCOHOL: NEVER

## 2024-01-06 ASSESSMENT — ENCOUNTER SYMPTOMS: BACK PAIN: 1

## 2024-01-06 ASSESSMENT — PAIN DESCRIPTION - LOCATION
LOCATION: BACK

## 2024-01-06 NOTE — ED TRIAGE NOTES
Pt arrives with family c/o leaning over to get something from freezer and felt severe right lower back pain radiating to other side. Pt has Lidocaine patch, took Flexeril anf OTC with no relief. Pt denies injury or other issues. Pt is Aox4.

## 2024-01-06 NOTE — ED PROVIDER NOTES
the ER for further evaluation. The patient is agreeable with discharge.      Amount and/or Complexity of Data Reviewed  Labs: ordered. Decision-making details documented in ED Course.  Radiology: ordered.    Risk  Prescription drug management.            REASSESSMENT     ED Course as of 01/06/24 1810   Sat Jan 06, 2024   1709 POC Pregnancy Urine Qual:    Pregnancy, Urine Negative  Negative  [TR]   1752 Blood, Urine(!): LARGE  Patient reports current vaginal bleeding  [TR]      ED Course User Index  [TR] Mena Mckeon PA-C           CONSULTS:  None    PROCEDURES:  Unless otherwise noted below, none     Procedures      FINAL IMPRESSION      1. Bulging lumbar disc    2. Strain of lumbar region, initial encounter    3. Acute right-sided low back pain without sciatica          DISPOSITION/PLAN   DISPOSITION Decision To Discharge 01/06/2024 06:04:01 PM      PATIENT REFERRED TO:  Chencho Mckeon MD  [address] [address] [phone]    Schedule an appointment as soon as possible for a visit       [facility] EMERGENCY DEP  [address] [id number] [phone]  Go to   If symptoms worsen      DISCHARGE MEDICATIONS:  New Prescriptions    METHOCARBAMOL (ROBAXIN-750) 750 MG TABLET    Take 1 tablet by mouth every 6 hours as needed (Muscle spasms)    METHYLPREDNISOLONE (MEDROL, NGHIA,) 4 MG TABLET    Take by mouth.    OXYCODONE-ACETAMINOPHEN (PERCOCET) 5-325 MG PER TABLET    Take 1 tablet by mouth every 6 hours as needed for Pain for up to 3 days. Intended supply: 3 days. Take lowest dose possible to manage pain Max Daily Amount: 4 tablets         (Please note that portions of this note were completed with a voice recognition program.  Efforts were made to edit the dictations but occasionally words are mis-transcribed.)    Mena Mckeon PA-C (electronically signed)  Emergency Attending Physician / Physician Assistant / Nurse Practitioner             Mena Mckeon PA-C  01/06/24

## 2024-01-06 NOTE — DISCHARGE INSTRUCTIONS
You presented to the ED with acute back pain. You have no red flag symptoms concerning for spinal cord injury. You most likely have muscle skeletal injury/soft tissue injury of the back muscles. Symptomatic management is recommended with ice for the first 2 days followed by heat. I recommend taking Naproxen every 12 hours  or Motrin(600mg every 6 hours). A prescription for robaxin was written, take as needed for muscle spasm. You may also try lidocaine patches if you have pinpoint pain or tenderness, these are available over-the-counter at the pharmacy as Salonpas or 4% lidocaine patch. Bed rest is not recommended. Use provided exercises and stretches and follow-up with your PCP for further treatment and evaluation to include outpatient physical therapy.

## 2024-01-07 ENCOUNTER — PATIENT MESSAGE (OUTPATIENT)
Age: 29
End: 2024-01-07

## 2024-01-08 ENCOUNTER — CARE COORDINATION (OUTPATIENT)
Dept: OTHER | Facility: CLINIC | Age: 29
End: 2024-01-08

## 2024-01-08 ENCOUNTER — TELEMEDICINE (OUTPATIENT)
Age: 29
End: 2024-01-08
Payer: COMMERCIAL

## 2024-01-08 DIAGNOSIS — N63.20 MASS OF LEFT BREAST, UNSPECIFIED QUADRANT: Primary | ICD-10-CM

## 2024-01-08 DIAGNOSIS — M51.26 PROTRUSION OF LUMBAR INTERVERTEBRAL DISC: ICD-10-CM

## 2024-01-08 PROCEDURE — 99213 OFFICE O/P EST LOW 20 MIN: CPT | Performed by: STUDENT IN AN ORGANIZED HEALTH CARE EDUCATION/TRAINING PROGRAM

## 2024-01-08 SDOH — ECONOMIC STABILITY: TRANSPORTATION INSECURITY
IN THE PAST 12 MONTHS, HAS LACK OF TRANSPORTATION KEPT YOU FROM MEETINGS, WORK, OR FROM GETTING THINGS NEEDED FOR DAILY LIVING?: NO

## 2024-01-08 SDOH — ECONOMIC STABILITY: FOOD INSECURITY: WITHIN THE PAST 12 MONTHS, YOU WORRIED THAT YOUR FOOD WOULD RUN OUT BEFORE YOU GOT MONEY TO BUY MORE.: NEVER TRUE

## 2024-01-08 SDOH — ECONOMIC STABILITY: FOOD INSECURITY: WITHIN THE PAST 12 MONTHS, THE FOOD YOU BOUGHT JUST DIDN'T LAST AND YOU DIDN'T HAVE MONEY TO GET MORE.: NEVER TRUE

## 2024-01-08 SDOH — ECONOMIC STABILITY: INCOME INSECURITY: HOW HARD IS IT FOR YOU TO PAY FOR THE VERY BASICS LIKE FOOD, HOUSING, MEDICAL CARE, AND HEATING?: NOT HARD AT ALL

## 2024-01-08 SDOH — ECONOMIC STABILITY: HOUSING INSECURITY
IN THE LAST 12 MONTHS, WAS THERE A TIME WHEN YOU DID NOT HAVE A STEADY PLACE TO SLEEP OR SLEPT IN A SHELTER (INCLUDING NOW)?: NO

## 2024-01-08 ASSESSMENT — ENCOUNTER SYMPTOMS
SHORTNESS OF BREATH: 0
BLOOD IN STOOL: 0
CONSTIPATION: 0
ABDOMINAL PAIN: 0
COUGH: 0
DIARRHEA: 0
VOMITING: 0
NAUSEA: 0

## 2024-01-08 NOTE — TELEPHONE ENCOUNTER
From: Anu Maya  To: Dr. Vonnie Mccall  Sent: 1/7/2024 4:34 PM EST  Subject: ER FOLLOW UP    Good afternoon,     I was in the er over the weekend for sudden onset of back pain. Was found to have a bulging disc to the L4-L5. Can you place a referral for PT without coming in?     Also, an incidental 2cm lesion was found to the breast. Can I have an ultrasound placed to follow up?     Anu

## 2024-01-08 NOTE — CARE COORDINATION
Verified  and address for HIPAA security.  Introduced [facility] services for patient.   Patient does not identify any Care Management needs at this time and declines services.      Pt attended VV with PCP today to f/u from ER visit. Pt reported back pain 5/10 on pain scale with pain med. Pain worse with flexion. Denies issues with falling, numbness or tingling with legs/feet. Incidental lesion found on left breast during work-up. Denies pain. U/S has been scheduled. PCP has referred pt to PT, which has been scheduled.    Contact info provided for future reference.

## 2024-01-08 NOTE — PROGRESS NOTES
Anu Maya is a 28 y.o. female who was seen by synchronous (real-time) audio-video technology on 1/8/2024.      Anu Maya, was evaluated through a synchronous (real-time) audio-video encounter. The patient (or guardian if applicable) is aware that this is a billable service, which includes applicable co-pays. This Virtual Visit was conducted with patient's (and/or legal guardian's) consent. Patient identification was verified, and a caregiver was present when appropriate.   The patient was located at Other (work): [address] [place] [id number], [address], [place] [address]   Provider was located at Facility (Appt Dept): [address] [place] [address]  [address]  [place] [address]        Subjective:   Anu Maya was seen for ER followup.     She went to the ED over the weekend for acute back pain after bending forward and was found to have a bulging disc. On imaging she was also noted to have a breast mass, which was incidental and was recommended to have an ultrasound to further assess. 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  She was prescribed robaxin, steroid taper, and percocet for back pain. She says it is slowly improving but still painful - worse with sitting down, better standing. She requests a referral to PT.         Past Medical History:   Diagnosis Date    Asthma     EXERCISE AND WEATHER    Herpes 03/2013    Irritable bowel syndrome     Migraine     Migraine     Other ill-defined conditions(799.89)     Left breast mass    Reactive airway disease     exercise induced       Prior to Admission medications    Medication Sig Start Date End Date Taking? Authorizing Provider   methocarbamol (ROBAXIN-750) 750 MG tablet Take 1 tablet by mouth every 6 hours as needed (Muscle spasms) 1/6/24 1/10/24 Yes Mena Mckeon PA-C   methylPREDNISolone (MEDROL, NGHIA,) 4 MG tablet Take by mouth. 1/6/24  Yes Mena Mckeon PA-C

## 2024-01-09 ENCOUNTER — HOSPITAL ENCOUNTER (OUTPATIENT)
Facility: HOSPITAL | Age: 29
Setting detail: RECURRING SERIES
Discharge: HOME | End: 2024-01-12
Payer: COMMERCIAL

## 2024-01-09 PROCEDURE — 97161 PT EVAL LOW COMPLEX 20 MIN: CPT

## 2024-01-09 PROCEDURE — 97140 MANUAL THERAPY 1/> REGIONS: CPT

## 2024-01-09 NOTE — THERAPY EVALUATION
AM        ===================================================================  I certify that the above Therapy Services are being furnished while the patient is under my care. I agree with the treatment plan and certify that this therapy is necessary.    Physician's Signature:_________________________   DATE:_________   TIME:________                           Johnny Briggs, DO    ** Signature, Date and Time must be completed for valid certification **  Please sign and fax to [phone].  Thank you

## 2024-01-11 ENCOUNTER — HOSPITAL ENCOUNTER (OUTPATIENT)
Facility: HOSPITAL | Age: 29
Setting detail: RECURRING SERIES
Discharge: HOME | End: 2024-01-14
Payer: COMMERCIAL

## 2024-01-11 PROCEDURE — 97140 MANUAL THERAPY 1/> REGIONS: CPT

## 2024-01-11 NOTE — PROGRESS NOTES
Modalities Rationale:     decrease inflammation and decrease pain to improve patient's ability to progress to PLOF and address remaining functional goals.       min [] Estim Unattended,             type/location:       []  w/ice    []  w/heat        min [] Estim Attended,             type/location:       []  w/ice   []  w/heat         []  w/US   []  TENS insruct            min []  Mechanical Traction,        type/lbs:        []  pro      []  sup           []  int       []  cont            []  before manual           []  after manual     min []  Ultrasound,         settings/location:      min  unbilled []  Ice     []  Heat            location/position:         min []  Vasopneumatic Device,      press/temp:   pre-treatment girth :    post-treatment girth :    measured at (landmark       location) :   If using vaso (only need to measure limb vaso being performed on)        min []  Other:        Skin assessment post-treatment (if applicable):    [x]  intact    []  redness- no adverse reaction                 []redness - adverse reaction:          [x]  Patient Education billed concurrently with other procedures   [x] Review HEP    [] Progressed/Changed HEP, detail:    [] Other detail:         Other Objective/Functional Measures    Pain Level at end of session (0-10 scale): 3      Assessment:  SI dysfunction present.  Lumbosacral fascia irritation contributing to lower back pain.  Patient will continue to benefit from skilled PT / OT services to modify and progress therapeutic interventions, analyze and address functional mobility deficits, analyze and address ROM deficits, analyze and address strength deficits, analyze and address soft tissue restrictions, analyze and cue for proper movement patterns, and analyze and modify for postural abnormalities to address functional deficits and attain remaining goals.    Progress toward goals / Updated goals:  []  See Progress Note/Recertification      PLAN  Yes  Continue

## 2024-01-15 ENCOUNTER — HOSPITAL ENCOUNTER (OUTPATIENT)
Facility: HOSPITAL | Age: 29
Setting detail: RECURRING SERIES
Discharge: HOME | End: 2024-01-18
Payer: COMMERCIAL

## 2024-01-15 PROCEDURE — 97140 MANUAL THERAPY 1/> REGIONS: CPT

## 2024-01-15 PROCEDURE — 97110 THERAPEUTIC EXERCISES: CPT

## 2024-01-15 NOTE — PROGRESS NOTES
PHYSICAL THERAPY - DAILY TREATMENT NOTE (updated 3/23)      Date: 1/15/2024          Patient Name:  Anu Maya :  1995   Medical   Diagnosis:  Protrusion of lumbar intervertebral disc [M51.26] Treatment Diagnosis:  M54.59  OTHER LOWER BACK PAIN    Referral Source:  Johnny Briggs DO Insurance:   Payor: [facility] / Plan: [facility] [employer] [facility] EMPLOYEES / Product Type: *No Product type* /                     Patient  verified yes     Visit #   Current  / Total 3 12   Time   In / Out 5:00 pm 5:50 pm   Total Treatment Time 50   Total Timed Codes 45         SUBJECTIVE    Pain Level (0-10 scale): 4    Any medication changes, allergies to medications, adverse drug reactions, diagnosis change, or new procedure performed?: [x] No    [] Yes (see summary sheet for update)  Medications: Verified on Patient Summary List    Subjective functional status/changes:     \"It's better, not gone but not as bad.\"    OBJECTIVE  ASIS landmarks =  Negative SLR B    Therapeutic Procedures:  Tx Min Billable or 1:1 Min (if diff from Tx Min) Procedure, Rationale, Specifics   25  32608 Therapeutic Exercise (timed):  increase ROM, strength, coordination, balance, and proprioception to improve patient's ability to progress to PLOF and address remaining functional goals. (see flow sheet as applicable)     Details if applicable:     20  86426 Manual Therapy (timed):  decrease pain, increase ROM, and increase tissue extensibility to improve patient's ability to progress to PLOF and address remaining functional goals.  The manual therapy interventions were performed at a separate and distinct time from the therapeutic activities interventions . (see flow sheet as applicable)     Details if applicable:    MET with right hamstring for right anterior innominate (omit)  MET for FRS left at L4/5 (omit)  Manual hamstring stretch B  STM right lumbar paraspinals and lumbosacral fascia  STM right QL muscle  TPR right TFL, gluteus medius, and piriformis

## 2024-01-16 ENCOUNTER — HOSPITAL ENCOUNTER (OUTPATIENT)
Facility: HOSPITAL | Age: 29
Discharge: HOME | End: 2024-01-19
Attending: STUDENT IN AN ORGANIZED HEALTH CARE EDUCATION/TRAINING PROGRAM
Payer: COMMERCIAL

## 2024-01-16 ENCOUNTER — TELEPHONE (OUTPATIENT)
Age: 29
End: 2024-01-16

## 2024-01-16 ENCOUNTER — TELEPHONE (OUTPATIENT)
Facility: HOSPITAL | Age: 29
End: 2024-01-16

## 2024-01-16 DIAGNOSIS — N63.20 MASS OF LEFT BREAST, UNSPECIFIED QUADRANT: ICD-10-CM

## 2024-01-16 PROCEDURE — 76642 ULTRASOUND BREAST LIMITED: CPT

## 2024-01-16 NOTE — TELEPHONE ENCOUNTER
Estee from [facility] called.  Patient had an appointment there today and Estee is requesting an order for an Ultrasound left breast biopsy be put in.    Estee - [phone]

## 2024-01-16 NOTE — TELEPHONE ENCOUNTER
Breast biopsy no longer needed -- patient scheduled herself to go and see Dr Rowell at [facility], [place] to get a biopsy done there.     [facility] notified that appointment is no longer needed there for biopsy. All will be done at [facility], [place].

## 2024-01-17 ENCOUNTER — HOSPITAL ENCOUNTER (OUTPATIENT)
Facility: HOSPITAL | Age: 29
Setting detail: SPECIMEN
Discharge: HOME | End: 2024-01-20

## 2024-01-17 ENCOUNTER — HOSPITAL ENCOUNTER (OUTPATIENT)
Facility: HOSPITAL | Age: 29
Setting detail: RECURRING SERIES
Discharge: HOME | End: 2024-01-20
Payer: COMMERCIAL

## 2024-01-17 ENCOUNTER — CLINICAL DOCUMENTATION (OUTPATIENT)
Age: 29
End: 2024-01-17

## 2024-01-17 ENCOUNTER — OFFICE VISIT (OUTPATIENT)
Age: 29
End: 2024-01-17

## 2024-01-17 DIAGNOSIS — N63.23 MASS OF LOWER OUTER QUADRANT OF LEFT BREAST: Primary | ICD-10-CM

## 2024-01-17 PROCEDURE — 97140 MANUAL THERAPY 1/> REGIONS: CPT

## 2024-01-17 PROCEDURE — 97110 THERAPEUTIC EXERCISES: CPT

## 2024-01-17 NOTE — PROGRESS NOTES
[facility] [facility]   OFFICE PROCEDURE PROGRESS NOTE        Chart reviewed for the following:   Butch SNOWDEN, have reviewed the History, Physical and updated the Allergic reactions for Anu Maya     TIME OUT performed immediately prior to start of procedure:   Butch SNOWDEN, have performed the following reviews on Anu Maya prior to the start of the procedure:            * Patient was identified by name and date of birth   * Agreement on procedure being performed was verified  * Risks and Benefits explained to the patient  * Procedure site verified and marked as necessary  * Patient was positioned for comfort  * Consent was signed and verified     Time: 3:20 pm      Date of procedure: 1/17/2024    Procedure performed by:  Butch Rowell MD    Provider assisted by: JUHI ARVIZU MA    Patient assisted by: self    How tolerated by patient: tolerated the procedure well with no complications    Post Procedural Pain Scale: 0    Comments: none

## 2024-01-17 NOTE — PROGRESS NOTES
HISTORY OF PRESENT ILLNESS  Anu Maya is a 28 y.o. female     HPI ESTABLISHED patient here for       Review of Systems      Physical Exam       ASSESSMENT and PLAN  {Assessment and Plan Chronic Disease:[id number]}

## 2024-01-17 NOTE — PROGRESS NOTES
HISTORY OF PRESENT ILLNESS  Anu Maya is a 28 y.o. female     HPI NEW patient consult referred by  SELF for  LEFT breast mass. Pt was seen in E.R for back pain, on 1/6/24 CT  was done , incidentally the mass was found on ct imaging. Pt denies any pain, skin changes or nipple inversion.       Family History:    Paternal grand father - prostate cancer  Maternal grand father - prostate cancer  Maternal 2nd cousin- breast cancer dx age 50  Maternal 3rd cousin - breast cancer dx age 45          BREAST US - 1/16/2024-   FINDINGS:   Left breast ultrasound was performed with attention to the inferolateral area of  mass seen on CT. There is a solid mass, oval, slightly lobulated, in the 4:00  location 3 cm from the nipple measuring 2.2 x 2.0 x 1.7 cm. This is  indeterminate, possible fibroadenoma, but with malignancy not excluded.  This  has been discussed with the patient.     IMPRESSION:  1. BI-RADS Assessment Category 4: Suspicious abnormality -left breast mass.  2. Patient has a follow-up appointment with Dr. Rowell.        Mammogram, n/a, BIRADS N/a      Review of Systems      Physical Exam       ASSESSMENT and PLAN  {Assessment and Plan Chronic Disease:[id number]}

## 2024-01-17 NOTE — PROGRESS NOTES
PHYSICAL THERAPY - DAILY TREATMENT NOTE (updated 3/23)      Date: 2024          Patient Name:  Anu Maya :  1995   Medical   Diagnosis:  Protrusion of lumbar intervertebral disc [M51.26] Treatment Diagnosis:  M54.59  OTHER LOWER BACK PAIN    Referral Source:  Johnny Briggs DO Insurance:   Payor: [facility] / Plan: [employer] [facility] EMPLOYEES / Product Type: *No Product type* /                     Patient  verified yes     Visit #   Current  / Total 4 12   Time   In / Out 5:35 pm 6:30 pm   Total Treatment Time 55   Total Timed Codes 50         SUBJECTIVE    Pain Level (0-10 scale): 2    Any medication changes, allergies to medications, adverse drug reactions, diagnosis change, or new procedure performed?: [x] No    [] Yes (see summary sheet for update)  Medications: Verified on Patient Summary List    Subjective functional status/changes:     \"It's better, it's more dull now.\"    OBJECTIVE  ASIS landmarks =  Negative SLR B  No lumbar rotational dysfunction noted    Therapeutic Procedures:  Tx Min Billable or 1:1 Min (if diff from Tx Min) Procedure, Rationale, Specifics   30  50260 Therapeutic Exercise (timed):  increase ROM, strength, coordination, balance, and proprioception to improve patient's ability to progress to PLOF and address remaining functional goals. (see flow sheet as applicable)     Details if applicable:     20  81153 Manual Therapy (timed):  decrease pain, increase ROM, and increase tissue extensibility to improve patient's ability to progress to PLOF and address remaining functional goals.  The manual therapy interventions were performed at a separate and distinct time from the therapeutic activities interventions . (see flow sheet as applicable)     Details if applicable:    MET with right hamstring for right anterior innominate (omit)  MET for FRS left at L4/5 (omit)  Manual hamstring stretch B  STM right lumbar paraspinals and lumbosacral fascia  STM right QL muscle  TPR right TFL,

## 2024-01-18 NOTE — PROGRESS NOTES
Patient ID: Addison Guajardo is a 26 y.o. male    Chief Compliant:  Chief Complaint   Patient presents with    Back Pain     Lumbar back       HPI:  This is a 26 y.o. male who presents to the clinic today for chronic midline low back pain with bilateral radicular pain.  Patient had a MRI lumbar spine taken on 10/10/2023 demonstrates multilevel degenerative changes with disc degeneration greatest at L4-5, L5-S1, ,mild foraminal narrowing at L4-5 greatest on the right.  He was sent to pain management at that time.  Patient reports today he is doing a lot better after medial branch blocks and RFA's done by pain management.  Patient states that his radiculopathy has improved and he does not notice any shooting pain down his legs.  He still has occasional stiffness and tightness in his midline low back.  Patient most recently on 1/11/2024 had his last RFA and states that he is still doing well.  Patient has yet to resume full activity at this time.  Patient reports most of his discomfort when he is doing strenuous  activities and physical assessments.  Patient states most of his radiculopathy is exerted when running and he has not yet started this.  He says he is going to start back with a training regimen this week sometime.    Review of Systems   All other systems reviewed and are negative.    Past History:  No current outpatient medications on file.  Allergies   Allergen Reactions    Lexapro [Escitalopram] Other (See Comments)     20 mg dose: worse insomnia and less energy     Social History     Socioeconomic History    Marital status:      Spouse name: Not on file    Number of children: Not on file    Years of education: Not on file    Highest education level: Not on file   Occupational History    Not on file   Tobacco Use    Smoking status: Never    Smokeless tobacco: Never   Vaping Use    Vaping Use: Never used   Substance and Sexual Activity    Alcohol use: Not Currently    Drug use: Never    Sexual  HISTORY OF PRESENT ILLNESS  Anu Maya is a 28 y.o. female.    HPI  NEW patient consult referred by  SELF for  LEFT breast mass. Pt was seen in E.R for back pain, on 1/6/24 CT  was done , incidentally the mass was found on ct imaging. Pt denies any pain, skin changes or nipple inversion.         Family History:     Paternal grand father - prostate cancer  Maternal grand father - prostate cancer  Maternal 2nd cousin- breast cancer dx age 50  Maternal 3rd cousin - breast cancer dx age 45        BREAST US - 1/16/2024-   FINDINGS:   Left breast ultrasound was performed with attention to the inferolateral area of  mass seen on CT. There is a solid mass, oval, slightly lobulated, in the 4:00  location 3 cm from the nipple measuring 2.2 x 2.0 x 1.7 cm. This is  indeterminate, possible fibroadenoma, but with malignancy not excluded.  This  has been discussed with the patient.     IMPRESSION:  1. BI-RADS Assessment Category 4: Suspicious abnormality -left breast mass.  2. Patient has a follow-up appointment with Dr. Rowell.        Mammogram, n/a, BIRADS N/a      Past Medical History:   Diagnosis Date    Asthma     EXERCISE AND WEATHER    Herpes 03/2013    Irritable bowel syndrome     Migraine     Migraine     Other ill-defined conditions(799.89)     Left breast mass    Reactive airway disease     exercise induced       Past Surgical History:   Procedure Laterality Date    BREAST SURGERY      BENIGN MASSES REMOVED-LEFT     CARPAL TUNNEL RELEASE Left 05/2020    MYOMECTOMY  11/2022    ORTHOPEDIC SURGERY Left     CARPAL TUNNEL RELEASE       Social History     Socioeconomic History    Marital status: Single     Spouse name: Not on file    Number of children: Not on file    Years of education: Not on file    Highest education level: Not on file   Occupational History    Not on file   Tobacco Use    Smoking status: Never    Smokeless tobacco: Never   Substance and Sexual Activity    Alcohol use: Not Currently    Drug use:

## 2024-01-19 ENCOUNTER — TELEPHONE (OUTPATIENT)
Age: 29
End: 2024-01-19

## 2024-01-22 ENCOUNTER — HOSPITAL ENCOUNTER (OUTPATIENT)
Facility: HOSPITAL | Age: 29
Setting detail: RECURRING SERIES
Discharge: HOME | End: 2024-01-25
Payer: COMMERCIAL

## 2024-01-22 ENCOUNTER — PREP FOR PROCEDURE (OUTPATIENT)
Age: 29
End: 2024-01-22

## 2024-01-22 DIAGNOSIS — N63.23 LUMP IN LOWER OUTER QUADRANT OF LEFT BREAST: Primary | ICD-10-CM

## 2024-01-22 PROCEDURE — 97140 MANUAL THERAPY 1/> REGIONS: CPT

## 2024-01-22 PROCEDURE — 97110 THERAPEUTIC EXERCISES: CPT

## 2024-01-22 NOTE — PROGRESS NOTES
PHYSICAL THERAPY - DAILY TREATMENT NOTE (updated 3/23)      Date: 2024          Patient Name:  Anu Maya :  1995   Medical   Diagnosis:  Protrusion of lumbar intervertebral disc [M51.26] Treatment Diagnosis:  M54.59  OTHER LOWER BACK PAIN    Referral Source:  Johnny Briggs DO Insurance:   Payor: [facility] / Plan: [facility] EMPLOYEES / Product Type: *No Product type* /                     Patient  verified yes     Visit #   Current  / Total 5 12   Time   In / Out 5:00 pm 6:50 pm   Total Treatment Time 50   Total Timed Codes 45         SUBJECTIVE    Pain Level (0-10 scale): 2    Any medication changes, allergies to medications, adverse drug reactions, diagnosis change, or new procedure performed?: [x] No    [] Yes (see summary sheet for update)  Medications: Verified on Patient Summary List    Subjective functional status/changes:     \"It's better.\"    OBJECTIVE  ASIS landmarks =  Negative SLR B    Therapeutic Procedures:  Tx Min Billable or 1:1 Min (if diff from Tx Min) Procedure, Rationale, Specifics   30  40377 Therapeutic Exercise (timed):  increase ROM, strength, coordination, balance, and proprioception to improve patient's ability to progress to PLOF and address remaining functional goals. (see flow sheet as applicable)     Details if applicable:     15  03720 Manual Therapy (timed):  decrease pain, increase ROM, and increase tissue extensibility to improve patient's ability to progress to PLOF and address remaining functional goals.  The manual therapy interventions were performed at a separate and distinct time from the therapeutic activities interventions . (see flow sheet as applicable)     Details if applicable:    MET with right hamstring for right anterior innominate (omit)  MET for FRS left at L4/5 (omit)  Manual hamstring stretch B  STM right lumbar paraspinals and lumbosacral fascia (omit)  STM right QL muscle (omit)  TPR right TFL, gluteus medius, and piriformis muscles

## 2024-01-24 ENCOUNTER — HOSPITAL ENCOUNTER (OUTPATIENT)
Facility: HOSPITAL | Age: 29
Setting detail: RECURRING SERIES
Discharge: HOME | End: 2024-01-27
Payer: COMMERCIAL

## 2024-01-24 PROCEDURE — 97140 MANUAL THERAPY 1/> REGIONS: CPT

## 2024-01-24 PROCEDURE — 97110 THERAPEUTIC EXERCISES: CPT

## 2024-01-24 NOTE — PROGRESS NOTES
PHYSICAL THERAPY - DAILY TREATMENT NOTE (updated 3/23)      Date: 2024          Patient Name:  Anu Maya :  1995   Medical   Diagnosis:  Protrusion of lumbar intervertebral disc [M51.26] Treatment Diagnosis:  M54.59  OTHER LOWER BACK PAIN    Referral Source:  Johnny Briggs DO Insurance:   Payor: [facility] / Plan: [facility] EMPLOYEES / Product Type: *No Product type* /                     Patient  verified yes     Visit #   Current  / Total 6 12   Time   In / Out 1:30 pm 2:25 pm   Total Treatment Time 55   Total Timed Codes 50         SUBJECTIVE    Pain Level (0-10 scale): 2    Any medication changes, allergies to medications, adverse drug reactions, diagnosis change, or new procedure performed?: [x] No    [] Yes (see summary sheet for update)  Medications: Verified on Patient Summary List    Subjective functional status/changes:     States better overall, but still having right buttock pain.    OBJECTIVE  ASIS landmarks =    Therapeutic Procedures:  Tx Min Billable or 1:1 Min (if diff from Tx Min) Procedure, Rationale, Specifics   35  19760 Therapeutic Exercise (timed):  increase ROM, strength, coordination, balance, and proprioception to improve patient's ability to progress to PLOF and address remaining functional goals. (see flow sheet as applicable)     Details if applicable:     15  33226 Manual Therapy (timed):  decrease pain, increase ROM, and increase tissue extensibility to improve patient's ability to progress to PLOF and address remaining functional goals.  The manual therapy interventions were performed at a separate and distinct time from the therapeutic activities interventions . (see flow sheet as applicable)     Details if applicable:    MET with right hamstring for right anterior innominate (omit)  MET for FRS left at L4/5 (omit)  Manual hamstring stretch B  STM right lumbar paraspinals and lumbosacral fascia (omit)  STM right QL muscle (omit)  TPR right TFL, gluteus medius, and

## 2024-01-29 ENCOUNTER — HOSPITAL ENCOUNTER (OUTPATIENT)
Facility: HOSPITAL | Age: 29
Setting detail: RECURRING SERIES
Discharge: HOME | End: 2024-02-01
Payer: COMMERCIAL

## 2024-01-29 PROCEDURE — 97110 THERAPEUTIC EXERCISES: CPT

## 2024-01-29 PROCEDURE — 97140 MANUAL THERAPY 1/> REGIONS: CPT

## 2024-01-30 NOTE — PROGRESS NOTES
PHYSICAL THERAPY - DAILY TREATMENT NOTE (updated 3/23)      Date: 2024          Patient Name:  Anu Maya :  1995   Medical   Diagnosis:  Protrusion of lumbar intervertebral disc [M51.26] Treatment Diagnosis:  M54.59  OTHER LOWER BACK PAIN    Referral Source:  Johnny Briggs DO Insurance:   Payor: [facility] / Plan: [employer] EMPLOYEES / Product Type: *No Product type* /                     Patient  verified yes     Visit #   Current  / Total 6 12   Time   In / Out 5:00 pm 5:50 pm   Total Treatment Time 50   Total Timed Codes 45         SUBJECTIVE    Pain Level (0-10 scale): 2    Any medication changes, allergies to medications, adverse drug reactions, diagnosis change, or new procedure performed?: [x] No    [] Yes (see summary sheet for update)  Medications: Verified on Patient Summary List    Subjective functional status/changes:     \"It's better, but still gets sore.\"    OBJECTIVE  ASIS landmarks =  No lumbar rotational dysfunction noted    Therapeutic Procedures:  Tx Min Billable or 1:1 Min (if diff from Tx Min) Procedure, Rationale, Specifics   30  65841 Therapeutic Exercise (timed):  increase ROM, strength, coordination, balance, and proprioception to improve patient's ability to progress to PLOF and address remaining functional goals. (see flow sheet as applicable)     Details if applicable:     15  33896 Manual Therapy (timed):  decrease pain, increase ROM, and increase tissue extensibility to improve patient's ability to progress to PLOF and address remaining functional goals.  The manual therapy interventions were performed at a separate and distinct time from the therapeutic activities interventions . (see flow sheet as applicable)     Details if applicable:    MET with right hamstring for right anterior innominate (omit)  MET for FRS left at L4/5 (omit)  Manual hamstring stretch B  STM right lumbar paraspinals and lumbosacral fascia (omit)  STM right QL muscle (omit)  TPR right TFL,

## 2024-01-31 ENCOUNTER — HOSPITAL ENCOUNTER (OUTPATIENT)
Facility: HOSPITAL | Age: 29
Setting detail: RECURRING SERIES
Discharge: HOME | End: 2024-02-03
Payer: COMMERCIAL

## 2024-01-31 PROCEDURE — 97140 MANUAL THERAPY 1/> REGIONS: CPT

## 2024-01-31 PROCEDURE — 97110 THERAPEUTIC EXERCISES: CPT

## 2024-01-31 NOTE — PROGRESS NOTES
PHYSICAL THERAPY - DAILY TREATMENT NOTE (updated 3/23)      Date: 2024          Patient Name:  Anu Maya :  1995   Medical   Diagnosis:  Protrusion of lumbar intervertebral disc [M51.26] Treatment Diagnosis:  M54.59  OTHER LOWER BACK PAIN    Referral Source:  Johnny Briggs DO Insurance:   Payor: [facility] / Plan: [facility] EMPLOYEES / Product Type: *No Product type* /                     Patient  verified yes     Visit #   Current  / Total 8 12   Time   In / Out 5:00 pm 5:50 pm   Total Treatment Time 50   Total Timed Codes 45         SUBJECTIVE    Pain Level (0-10 scale): 1    Any medication changes, allergies to medications, adverse drug reactions, diagnosis change, or new procedure performed?: [x] No    [] Yes (see summary sheet for update)  Medications: Verified on Patient Summary List    Subjective functional status/changes:     States feeling much better.  Slight buttock soreness.    OBJECTIVE  ASIS landmarks =  Tenderness right TFL, gluteus medius, piriformis, and quadratus femoris muscles.    Therapeutic Procedures:  Tx Min Billable or 1:1 Min (if diff from Tx Min) Procedure, Rationale, Specifics   30  22774 Therapeutic Exercise (timed):  increase ROM, strength, coordination, balance, and proprioception to improve patient's ability to progress to PLOF and address remaining functional goals. (see flow sheet as applicable)     Details if applicable:     15  35493 Manual Therapy (timed):  decrease pain, increase ROM, and increase tissue extensibility to improve patient's ability to progress to PLOF and address remaining functional goals.  The manual therapy interventions were performed at a separate and distinct time from the therapeutic activities interventions . (see flow sheet as applicable)     Details if applicable:    MET with right hamstring for right anterior innominate (omit)  MET for FRS left at L4/5 (omit)  Manual hamstring stretch B  STM right lumbar paraspinals and lumbosacral

## 2024-02-05 ENCOUNTER — APPOINTMENT (OUTPATIENT)
Facility: HOSPITAL | Age: 29
End: 2024-02-05
Payer: COMMERCIAL

## 2024-02-14 ENCOUNTER — HOSPITAL ENCOUNTER (OUTPATIENT)
Facility: HOSPITAL | Age: 29
Setting detail: RECURRING SERIES
Discharge: HOME | End: 2024-02-17
Payer: COMMERCIAL

## 2024-02-14 PROCEDURE — 97110 THERAPEUTIC EXERCISES: CPT

## 2024-02-14 PROCEDURE — 97140 MANUAL THERAPY 1/> REGIONS: CPT

## 2024-02-14 NOTE — PROGRESS NOTES
PHYSICAL THERAPY - DAILY TREATMENT NOTE (updated 3/23)      Date: 2024          Patient Name:  Anu Maya :  1995   Medical   Diagnosis:  Protrusion of lumbar intervertebral disc [M51.26] Treatment Diagnosis:  M54.59  OTHER LOWER BACK PAIN    Referral Source:  Johnny Briggs DO Insurance:   Payor: [facility] / Plan: [facility] EMPLOYEES / Product Type: *No Product type* /                     Patient  verified yes     Visit #   Current  / Total 9 12   Time   In / Out 10:00 am 10:m   Total Treatment Time 50   Total Timed Codes 45         SUBJECTIVE    Pain Level (0-10 scale): 1    Any medication changes, allergies to medications, adverse drug reactions, diagnosis change, or new procedure performed?: [x] No    [] Yes (see summary sheet for update)  Medications: Verified on Patient Summary List    Subjective functional status/changes:     Has been on vacation the past week.  \"I did a lot of walking, it did ok, just a little sore.\"    OBJECTIVE  ASIS landmarks =  Tenderness right TFL, gluteus medius, piriformis, and quadratus femoris muscles.    Therapeutic Procedures:  Tx Min Billable or 1:1 Min (if diff from Tx Min) Procedure, Rationale, Specifics   30  59604 Therapeutic Exercise (timed):  increase ROM, strength, coordination, balance, and proprioception to improve patient's ability to progress to PLOF and address remaining functional goals. (see flow sheet as applicable)     Details if applicable:     15  38250 Manual Therapy (timed):  decrease pain, increase ROM, and increase tissue extensibility to improve patient's ability to progress to PLOF and address remaining functional goals.  The manual therapy interventions were performed at a separate and distinct time from the therapeutic activities interventions . (see flow sheet as applicable)     Details if applicable:    Manual hamstring stretch B  TPR right TFL, gluteus medius, and piriformis muscles                  45     Total Total

## 2024-02-26 ENCOUNTER — APPOINTMENT (OUTPATIENT)
Facility: HOSPITAL | Age: 29
End: 2024-02-26
Payer: COMMERCIAL

## 2024-02-28 ENCOUNTER — HOSPITAL ENCOUNTER (OUTPATIENT)
Facility: HOSPITAL | Age: 29
Setting detail: RECURRING SERIES
Discharge: HOME | End: 2024-03-02
Payer: COMMERCIAL

## 2024-02-28 PROCEDURE — 97110 THERAPEUTIC EXERCISES: CPT

## 2024-02-28 NOTE — PROGRESS NOTES
tolerated  []  Discharge due to :  [x]  Patient to try on her own for 1 week.  If doing well will formally discharge at that time.      John Coffey, PT       2/28/2024       6:53 PM

## 2024-03-11 ENCOUNTER — HOSPITAL ENCOUNTER (OUTPATIENT)
Facility: HOSPITAL | Age: 29
Discharge: HOME | End: 2024-03-14
Payer: COMMERCIAL

## 2024-03-11 VITALS
HEART RATE: 71 BPM | TEMPERATURE: 98.5 F | HEIGHT: 67 IN | DIASTOLIC BLOOD PRESSURE: 72 MMHG | WEIGHT: 162 LBS | SYSTOLIC BLOOD PRESSURE: 111 MMHG | BODY MASS INDEX: 25.43 KG/M2

## 2024-03-11 LAB
BASOPHILS # BLD: 0 K/UL (ref 0–0.1)
BASOPHILS NFR BLD: 1 % (ref 0–1)
DIFFERENTIAL METHOD BLD: NORMAL
EOSINOPHIL # BLD: 0.1 K/UL (ref 0–0.4)
EOSINOPHIL NFR BLD: 2 % (ref 0–7)
ERYTHROCYTE [DISTWIDTH] IN BLOOD BY AUTOMATED COUNT: 13.5 % (ref 11.5–14.5)
HCT VFR BLD AUTO: 38.3 % (ref 35–47)
HGB BLD-MCNC: 12.6 G/DL (ref 11.5–16)
IMM GRANULOCYTES # BLD AUTO: 0 K/UL (ref 0–0.04)
IMM GRANULOCYTES NFR BLD AUTO: 0 % (ref 0–0.5)
LYMPHOCYTES # BLD: 1.9 K/UL (ref 0.8–3.5)
LYMPHOCYTES NFR BLD: 41 % (ref 12–49)
MCH RBC QN AUTO: 28 PG (ref 26–34)
MCHC RBC AUTO-ENTMCNC: 32.9 G/DL (ref 30–36.5)
MCV RBC AUTO: 85.1 FL (ref 80–99)
MONOCYTES # BLD: 0.4 K/UL (ref 0–1)
MONOCYTES NFR BLD: 9 % (ref 5–13)
NEUTS SEG # BLD: 2.1 K/UL (ref 1.8–8)
NEUTS SEG NFR BLD: 47 % (ref 32–75)
NRBC # BLD: 0 K/UL (ref 0–0.01)
NRBC BLD-RTO: 0 PER 100 WBC
PLATELET # BLD AUTO: 285 K/UL (ref 150–400)
PMV BLD AUTO: 9.7 FL (ref 8.9–12.9)
RBC # BLD AUTO: 4.5 M/UL (ref 3.8–5.2)
WBC # BLD AUTO: 4.6 K/UL (ref 3.6–11)

## 2024-03-11 PROCEDURE — 36415 COLL VENOUS BLD VENIPUNCTURE: CPT

## 2024-03-11 PROCEDURE — 85025 COMPLETE CBC W/AUTO DIFF WBC: CPT

## 2024-03-11 RX ORDER — NICOTINE POLACRILEX 2 MG
1 GUM BUCCAL DAILY
COMMUNITY

## 2024-03-11 RX ORDER — LEVONORGESTREL 19.5 MG/1
1 INTRAUTERINE DEVICE INTRAUTERINE ONCE
COMMUNITY

## 2024-03-11 NOTE — PERIOP NOTE
[facility]  PREOPERATIVE INSTRUCTIONS    Surgery Date:   3/14/24    Your surgeon's office or [facility] staff will call you between 4 PM- 8 PM the day before surgery with your arrival time. If your surgery is on a Monday, you will receive a call the preceding Friday. If your surgeon's office has given you, your arrival time then go by that time.    Please report to [facility] Patient Access/Admitting on the 1st floor.  Bring your insurance card, photo identification, and any copayment ( if applicable).   If you are going home the same day of your surgery, you must have a responsible adult to drive you home. You need to have a responsible adult to stay with you the first 24 hours after surgery and you should not drive a car for 24 hours following your surgery.  If you are being admitted to the hospital, please leave personal belongings/luggage in your car until you have an assigned hospital room number.  Do NOT drink alcohol or smoke 24 hours before surgery. STOP smoking for 14 days prior as it helps with breathing and healing after surgery.  Please wear comfortable clothes. Wear your glasses instead of contacts. We ask that all money, jewelry and valuables be left at home. Wear no make up, particularly mascara, the day of surgery.    All body piercings, rings, and jewelry need to be removed and left at home. Remove all nail polish except for clear. Please wear your hair loose or down, no pony-tails, buns, or any metal hair accessories. You may wear deodorant, unless having breast surgery.  Do not shave any body area within 24 hours of your surgery.  Please follow all instructions to avoid any potential surgical cancellation.  Should your physical condition change, (i.e. fever, cold, flu, etc.) please notify your surgeon as soon as possible.  It is important to be on time. If a situation occurs where you may be delayed, please call:  [phone] / [phone] on the day of surgery.  The Preadmission

## 2024-03-11 NOTE — PERIOP NOTE
[facility]  PREOPERATIVE INSTRUCTIONS    Surgery Date:   3/14/24    Your surgeon's office or [facility] staff will call you between 4 PM- 8 PM the day before surgery with your arrival time. If your surgery is on a Monday, you will receive a call the preceding Friday. If your surgeon's office has given you, your arrival time then go by that time.    Please report to [facility] Patient Access/Admitting on the 1st floor.  Bring your insurance card, photo identification, and any copayment ( if applicable).   If you are going home the same day of your surgery, you must have a responsible adult to drive you home. You need to have a responsible adult to stay with you the first 24 hours after surgery and you should not drive a car for 24 hours following your surgery.  If you are being admitted to the hospital, please leave personal belongings/luggage in your car until you have an assigned hospital room number.  Do NOT drink alcohol or smoke 24 hours before surgery. STOP smoking for 14 days prior as it helps with breathing and healing after surgery.  Please wear comfortable clothes. Wear your glasses instead of contacts. We ask that all money, jewelry and valuables be left at home. Wear no make up, particularly mascara, the day of surgery.    All body piercings, rings, and jewelry need to be removed and left at home. Remove all nail polish except for clear. Please wear your hair loose or down, no pony-tails, buns, or any metal hair accessories. You may wear deodorant, unless having breast surgery.  Do not shave any body area within 24 hours of your surgery.  Please follow all instructions to avoid any potential surgical cancellation.  Should your physical condition change, (i.e. fever, cold, flu, etc.) please notify your surgeon as soon as possible.  It is important to be on time. If a situation occurs where you may be delayed, please call:  [phone] / [phone] on the day of surgery.  The Preadmission

## 2024-03-13 RX ORDER — FENTANYL CITRATE 50 UG/ML
100 INJECTION, SOLUTION INTRAMUSCULAR; INTRAVENOUS
Status: CANCELLED | OUTPATIENT
Start: 2024-03-13 | End: 2024-03-14

## 2024-03-13 RX ORDER — MIDAZOLAM HYDROCHLORIDE 2 MG/2ML
2 INJECTION, SOLUTION INTRAMUSCULAR; INTRAVENOUS
Status: CANCELLED | OUTPATIENT
Start: 2024-03-13 | End: 2024-03-14

## 2024-03-13 RX ORDER — SODIUM CHLORIDE, SODIUM LACTATE, POTASSIUM CHLORIDE, CALCIUM CHLORIDE 600; 310; 30; 20 MG/100ML; MG/100ML; MG/100ML; MG/100ML
INJECTION, SOLUTION INTRAVENOUS CONTINUOUS
Status: CANCELLED | OUTPATIENT
Start: 2024-03-13

## 2024-03-13 RX ORDER — ACETAMINOPHEN 500 MG
1000 TABLET ORAL ONCE
Status: CANCELLED | OUTPATIENT
Start: 2024-03-13 | End: 2024-03-13

## 2024-03-13 RX ORDER — SODIUM CHLORIDE 9 MG/ML
INJECTION, SOLUTION INTRAVENOUS PRN
Status: CANCELLED | OUTPATIENT
Start: 2024-03-13

## 2024-03-13 RX ORDER — SODIUM CHLORIDE 0.9 % (FLUSH) 0.9 %
5-40 SYRINGE (ML) INJECTION EVERY 12 HOURS SCHEDULED
Status: CANCELLED | OUTPATIENT
Start: 2024-03-13

## 2024-03-13 RX ORDER — SODIUM CHLORIDE 0.9 % (FLUSH) 0.9 %
5-40 SYRINGE (ML) INJECTION PRN
Status: CANCELLED | OUTPATIENT
Start: 2024-03-13

## 2024-03-13 RX ORDER — LIDOCAINE HYDROCHLORIDE 10 MG/ML
1 INJECTION, SOLUTION INFILTRATION; PERINEURAL
Status: CANCELLED | OUTPATIENT
Start: 2024-03-13 | End: 2024-03-14

## 2024-03-14 ENCOUNTER — HOSPITAL ENCOUNTER (OUTPATIENT)
Facility: HOSPITAL | Age: 29
Setting detail: OUTPATIENT SURGERY
Discharge: HOME | End: 2024-03-14
Attending: SURGERY | Admitting: SURGERY
Payer: COMMERCIAL

## 2024-03-14 ENCOUNTER — ANESTHESIA EVENT (OUTPATIENT)
Facility: HOSPITAL | Age: 29
End: 2024-03-14
Payer: COMMERCIAL

## 2024-03-14 ENCOUNTER — ANESTHESIA (OUTPATIENT)
Facility: HOSPITAL | Age: 29
End: 2024-03-14
Payer: COMMERCIAL

## 2024-03-14 VITALS
SYSTOLIC BLOOD PRESSURE: 111 MMHG | TEMPERATURE: 97.7 F | HEIGHT: 67 IN | DIASTOLIC BLOOD PRESSURE: 75 MMHG | HEART RATE: 72 BPM | BODY MASS INDEX: 25.37 KG/M2 | OXYGEN SATURATION: 100 % | RESPIRATION RATE: 20 BRPM

## 2024-03-14 DIAGNOSIS — N63.23 LUMP IN LOWER OUTER QUADRANT OF LEFT BREAST: Primary | ICD-10-CM

## 2024-03-14 LAB — HCG UR QL: NEGATIVE

## 2024-03-14 PROCEDURE — 3600000003 HC SURGERY LEVEL 3 BASE: Performed by: SURGERY

## 2024-03-14 PROCEDURE — 81025 URINE PREGNANCY TEST: CPT

## 2024-03-14 PROCEDURE — 6360000002 HC RX W HCPCS: Performed by: NURSE ANESTHETIST, CERTIFIED REGISTERED

## 2024-03-14 PROCEDURE — 2500000003 HC RX 250 WO HCPCS: Performed by: NURSE ANESTHETIST, CERTIFIED REGISTERED

## 2024-03-14 PROCEDURE — 7100000001 HC PACU RECOVERY - ADDTL 15 MIN: Performed by: SURGERY

## 2024-03-14 PROCEDURE — 2580000003 HC RX 258: Performed by: NURSE ANESTHETIST, CERTIFIED REGISTERED

## 2024-03-14 PROCEDURE — 3700000001 HC ADD 15 MINUTES (ANESTHESIA): Performed by: SURGERY

## 2024-03-14 PROCEDURE — 6360000002 HC RX W HCPCS: Performed by: ANESTHESIOLOGY

## 2024-03-14 PROCEDURE — 3600000013 HC SURGERY LEVEL 3 ADDTL 15MIN: Performed by: SURGERY

## 2024-03-14 PROCEDURE — 7100000000 HC PACU RECOVERY - FIRST 15 MIN: Performed by: SURGERY

## 2024-03-14 PROCEDURE — 3700000000 HC ANESTHESIA ATTENDED CARE: Performed by: SURGERY

## 2024-03-14 PROCEDURE — 2500000003 HC RX 250 WO HCPCS: Performed by: SURGERY

## 2024-03-14 PROCEDURE — 76998 US GUIDE INTRAOP: CPT | Performed by: SURGERY

## 2024-03-14 PROCEDURE — 6360000002 HC RX W HCPCS: Performed by: SURGERY

## 2024-03-14 PROCEDURE — 88307 TISSUE EXAM BY PATHOLOGIST: CPT

## 2024-03-14 PROCEDURE — 19120 REMOVAL OF BREAST LESION: CPT | Performed by: SURGERY

## 2024-03-14 PROCEDURE — 2709999900 HC NON-CHARGEABLE SUPPLY: Performed by: SURGERY

## 2024-03-14 PROCEDURE — 2580000003 HC RX 258: Performed by: ANESTHESIOLOGY

## 2024-03-14 RX ORDER — OXYCODONE HYDROCHLORIDE AND ACETAMINOPHEN 5; 325 MG/1; MG/1
1 TABLET ORAL EVERY 4 HOURS PRN
Qty: 15 TABLET | Refills: 0 | Status: SHIPPED | OUTPATIENT
Start: 2024-03-14 | End: 2024-03-17

## 2024-03-14 RX ORDER — PROCHLORPERAZINE EDISYLATE 5 MG/ML
5 INJECTION INTRAMUSCULAR; INTRAVENOUS
Status: DISCONTINUED | OUTPATIENT
Start: 2024-03-14 | End: 2024-03-14 | Stop reason: HOSPADM

## 2024-03-14 RX ORDER — DEXMEDETOMIDINE HYDROCHLORIDE 100 UG/ML
INJECTION, SOLUTION INTRAVENOUS PRN
Status: DISCONTINUED | OUTPATIENT
Start: 2024-03-14 | End: 2024-03-14 | Stop reason: SDUPTHER

## 2024-03-14 RX ORDER — ONDANSETRON 2 MG/ML
INJECTION INTRAMUSCULAR; INTRAVENOUS PRN
Status: DISCONTINUED | OUTPATIENT
Start: 2024-03-14 | End: 2024-03-14 | Stop reason: SDUPTHER

## 2024-03-14 RX ORDER — PROPOFOL 10 MG/ML
INJECTION, EMULSION INTRAVENOUS PRN
Status: DISCONTINUED | OUTPATIENT
Start: 2024-03-14 | End: 2024-03-14 | Stop reason: SDUPTHER

## 2024-03-14 RX ORDER — SODIUM CHLORIDE 0.9 % (FLUSH) 0.9 %
5-40 SYRINGE (ML) INJECTION EVERY 12 HOURS SCHEDULED
Status: DISCONTINUED | OUTPATIENT
Start: 2024-03-14 | End: 2024-03-14 | Stop reason: HOSPADM

## 2024-03-14 RX ORDER — BUPIVACAINE HYDROCHLORIDE 5 MG/ML
30 INJECTION, SOLUTION PERINEURAL ONCE
Status: CANCELLED | OUTPATIENT
Start: 2024-03-14 | End: 2024-03-14

## 2024-03-14 RX ORDER — FENTANYL CITRATE 50 UG/ML
INJECTION, SOLUTION INTRAMUSCULAR; INTRAVENOUS PRN
Status: DISCONTINUED | OUTPATIENT
Start: 2024-03-14 | End: 2024-03-14 | Stop reason: SDUPTHER

## 2024-03-14 RX ORDER — ONDANSETRON 2 MG/ML
4 INJECTION INTRAMUSCULAR; INTRAVENOUS
Status: DISCONTINUED | OUTPATIENT
Start: 2024-03-14 | End: 2024-03-14 | Stop reason: HOSPADM

## 2024-03-14 RX ORDER — SODIUM CHLORIDE, SODIUM LACTATE, POTASSIUM CHLORIDE, CALCIUM CHLORIDE 600; 310; 30; 20 MG/100ML; MG/100ML; MG/100ML; MG/100ML
INJECTION, SOLUTION INTRAVENOUS CONTINUOUS PRN
Status: DISCONTINUED | OUTPATIENT
Start: 2024-03-14 | End: 2024-03-14 | Stop reason: SDUPTHER

## 2024-03-14 RX ORDER — SODIUM CHLORIDE 0.9 % (FLUSH) 0.9 %
5-40 SYRINGE (ML) INJECTION PRN
Status: DISCONTINUED | OUTPATIENT
Start: 2024-03-14 | End: 2024-03-14 | Stop reason: HOSPADM

## 2024-03-14 RX ORDER — DIPHENHYDRAMINE HYDROCHLORIDE 50 MG/ML
12.5 INJECTION INTRAMUSCULAR; INTRAVENOUS
Status: DISCONTINUED | OUTPATIENT
Start: 2024-03-14 | End: 2024-03-14 | Stop reason: HOSPADM

## 2024-03-14 RX ORDER — LIDOCAINE HYDROCHLORIDE AND EPINEPHRINE 10; 10 MG/ML; UG/ML
30 INJECTION, SOLUTION INFILTRATION; PERINEURAL ONCE
Status: CANCELLED | OUTPATIENT
Start: 2024-03-14 | End: 2024-03-14

## 2024-03-14 RX ORDER — DEXAMETHASONE SODIUM PHOSPHATE 4 MG/ML
INJECTION, SOLUTION INTRA-ARTICULAR; INTRALESIONAL; INTRAMUSCULAR; INTRAVENOUS; SOFT TISSUE PRN
Status: DISCONTINUED | OUTPATIENT
Start: 2024-03-14 | End: 2024-03-14 | Stop reason: SDUPTHER

## 2024-03-14 RX ORDER — HYDROMORPHONE HYDROCHLORIDE 1 MG/ML
0.5 INJECTION, SOLUTION INTRAMUSCULAR; INTRAVENOUS; SUBCUTANEOUS EVERY 5 MIN PRN
Status: DISCONTINUED | OUTPATIENT
Start: 2024-03-14 | End: 2024-03-14 | Stop reason: HOSPADM

## 2024-03-14 RX ORDER — LORAZEPAM 2 MG/ML
0.5 INJECTION INTRAMUSCULAR
Status: DISCONTINUED | OUTPATIENT
Start: 2024-03-14 | End: 2024-03-14 | Stop reason: RX

## 2024-03-14 RX ORDER — NALOXONE HYDROCHLORIDE 0.4 MG/ML
INJECTION, SOLUTION INTRAMUSCULAR; INTRAVENOUS; SUBCUTANEOUS PRN
Status: DISCONTINUED | OUTPATIENT
Start: 2024-03-14 | End: 2024-03-14 | Stop reason: HOSPADM

## 2024-03-14 RX ORDER — MIDAZOLAM HYDROCHLORIDE 1 MG/ML
INJECTION INTRAMUSCULAR; INTRAVENOUS PRN
Status: DISCONTINUED | OUTPATIENT
Start: 2024-03-14 | End: 2024-03-14 | Stop reason: SDUPTHER

## 2024-03-14 RX ORDER — CEFAZOLIN SODIUM 1 G/3ML
INJECTION, POWDER, FOR SOLUTION INTRAMUSCULAR; INTRAVENOUS PRN
Status: DISCONTINUED | OUTPATIENT
Start: 2024-03-14 | End: 2024-03-14 | Stop reason: SDUPTHER

## 2024-03-14 RX ORDER — SODIUM CHLORIDE, SODIUM LACTATE, POTASSIUM CHLORIDE, CALCIUM CHLORIDE 600; 310; 30; 20 MG/100ML; MG/100ML; MG/100ML; MG/100ML
INJECTION, SOLUTION INTRAVENOUS CONTINUOUS
Status: DISCONTINUED | OUTPATIENT
Start: 2024-03-14 | End: 2024-03-14 | Stop reason: HOSPADM

## 2024-03-14 RX ORDER — HYDRALAZINE HYDROCHLORIDE 20 MG/ML
10 INJECTION INTRAMUSCULAR; INTRAVENOUS
Status: DISCONTINUED | OUTPATIENT
Start: 2024-03-14 | End: 2024-03-14 | Stop reason: HOSPADM

## 2024-03-14 RX ORDER — OXYCODONE HYDROCHLORIDE 5 MG/1
5 TABLET ORAL
Status: DISCONTINUED | OUTPATIENT
Start: 2024-03-14 | End: 2024-03-14 | Stop reason: HOSPADM

## 2024-03-14 RX ORDER — LIDOCAINE HYDROCHLORIDE 20 MG/ML
INJECTION, SOLUTION EPIDURAL; INFILTRATION; INTRACAUDAL; PERINEURAL PRN
Status: DISCONTINUED | OUTPATIENT
Start: 2024-03-14 | End: 2024-03-14 | Stop reason: SDUPTHER

## 2024-03-14 RX ORDER — SODIUM CHLORIDE 9 MG/ML
INJECTION, SOLUTION INTRAVENOUS PRN
Status: DISCONTINUED | OUTPATIENT
Start: 2024-03-14 | End: 2024-03-14 | Stop reason: HOSPADM

## 2024-03-14 RX ORDER — IPRATROPIUM BROMIDE AND ALBUTEROL SULFATE 2.5; .5 MG/3ML; MG/3ML
1 SOLUTION RESPIRATORY (INHALATION)
Status: DISCONTINUED | OUTPATIENT
Start: 2024-03-14 | End: 2024-03-14 | Stop reason: HOSPADM

## 2024-03-14 RX ORDER — FENTANYL CITRATE 50 UG/ML
25 INJECTION, SOLUTION INTRAMUSCULAR; INTRAVENOUS EVERY 5 MIN PRN
Status: DISCONTINUED | OUTPATIENT
Start: 2024-03-14 | End: 2024-03-14 | Stop reason: HOSPADM

## 2024-03-14 RX ADMIN — SODIUM CHLORIDE, POTASSIUM CHLORIDE, SODIUM LACTATE AND CALCIUM CHLORIDE 100 ML/HR: 600; 310; 30; 20 INJECTION, SOLUTION INTRAVENOUS at 15:26

## 2024-03-14 RX ADMIN — ONDANSETRON 4 MG: 2 INJECTION INTRAMUSCULAR; INTRAVENOUS at 13:03

## 2024-03-14 RX ADMIN — DEXAMETHASONE SODIUM PHOSPHATE 4 MG: 4 INJECTION INTRA-ARTICULAR; INTRALESIONAL; INTRAMUSCULAR; INTRAVENOUS; SOFT TISSUE at 13:03

## 2024-03-14 RX ADMIN — FENTANYL CITRATE 25 MCG: 50 INJECTION INTRAMUSCULAR; INTRAVENOUS at 14:13

## 2024-03-14 RX ADMIN — SODIUM CHLORIDE, POTASSIUM CHLORIDE, SODIUM LACTATE AND CALCIUM CHLORIDE: 600; 310; 30; 20 INJECTION, SOLUTION INTRAVENOUS at 12:51

## 2024-03-14 RX ADMIN — SODIUM CHLORIDE, POTASSIUM CHLORIDE, SODIUM LACTATE AND CALCIUM CHLORIDE: 600; 310; 30; 20 INJECTION, SOLUTION INTRAVENOUS at 14:42

## 2024-03-14 RX ADMIN — CEFAZOLIN 2 G: 330 INJECTION, POWDER, FOR SOLUTION INTRAMUSCULAR; INTRAVENOUS at 13:11

## 2024-03-14 RX ADMIN — DEXMEDETOMIDINE HYDROCHLORIDE 10 MCG: 100 INJECTION, SOLUTION, CONCENTRATE INTRAVENOUS at 13:11

## 2024-03-14 RX ADMIN — SODIUM CHLORIDE, POTASSIUM CHLORIDE, SODIUM LACTATE AND CALCIUM CHLORIDE: 600; 310; 30; 20 INJECTION, SOLUTION INTRAVENOUS at 15:59

## 2024-03-14 RX ADMIN — FENTANYL CITRATE 50 MCG: 50 INJECTION INTRAMUSCULAR; INTRAVENOUS at 12:56

## 2024-03-14 RX ADMIN — PROPOFOL 200 MG: 10 INJECTION, EMULSION INTRAVENOUS at 12:56

## 2024-03-14 RX ADMIN — FENTANYL CITRATE 25 MCG: 50 INJECTION INTRAMUSCULAR; INTRAVENOUS at 14:29

## 2024-03-14 RX ADMIN — DEXMEDETOMIDINE HYDROCHLORIDE 10 MCG: 100 INJECTION, SOLUTION, CONCENTRATE INTRAVENOUS at 13:41

## 2024-03-14 RX ADMIN — LIDOCAINE HYDROCHLORIDE 50 MG: 20 INJECTION, SOLUTION EPIDURAL; INFILTRATION; INTRACAUDAL; PERINEURAL at 12:56

## 2024-03-14 RX ADMIN — MIDAZOLAM 2 MG: 1 INJECTION INTRAMUSCULAR; INTRAVENOUS at 12:47

## 2024-03-14 RX ADMIN — FENTANYL CITRATE 50 MCG: 50 INJECTION INTRAMUSCULAR; INTRAVENOUS at 13:06

## 2024-03-14 ASSESSMENT — PAIN DESCRIPTION - LOCATION
LOCATION: BREAST
LOCATION: BREAST

## 2024-03-14 ASSESSMENT — PAIN SCALES - GENERAL
PAINLEVEL_OUTOF10: 6
PAINLEVEL_OUTOF10: 6
PAINLEVEL_OUTOF10: 0

## 2024-03-14 ASSESSMENT — PAIN DESCRIPTION - ORIENTATION
ORIENTATION: LEFT
ORIENTATION: LEFT

## 2024-03-14 NOTE — H&P
HISTORY OF PRESENT ILLNESS  Anu Maya is a 28 y.o. female.     HPI  NEW patient consult referred by  SELF for  LEFT breast mass. Pt was seen in E.R for back pain, on 1/6/24 CT  was done , incidentally the mass was found on ct imaging. Pt denies any pain, skin changes or nipple inversion.         Family History:     Paternal grand father - prostate cancer  Maternal grand father - prostate cancer  Maternal 2nd cousin- breast cancer dx age 50  Maternal 3rd cousin - breast cancer dx age 45        BREAST US - 1/16/2024-   FINDINGS:   Left breast ultrasound was performed with attention to the inferolateral area of  mass seen on CT. There is a solid mass, oval, slightly lobulated, in the 4:00  location 3 cm from the nipple measuring 2.2 x 2.0 x 1.7 cm. This is  indeterminate, possible fibroadenoma, but with malignancy not excluded.  This  has been discussed with the patient.     IMPRESSION:  1. BI-RADS Assessment Category 4: Suspicious abnormality -left breast mass.  2. Patient has a follow-up appointment with Dr. Rowell.        Mammogram, n/a, BIRADS N/a        Past Medical History        Past Medical History:   Diagnosis Date    Asthma       EXERCISE AND WEATHER    Herpes 03/2013    Irritable bowel syndrome      Migraine      Migraine      Other ill-defined conditions(799.89)       Left breast mass    Reactive airway disease       exercise induced            Past Surgical History         Past Surgical History:   Procedure Laterality Date    BREAST SURGERY         BENIGN MASSES REMOVED-LEFT     CARPAL TUNNEL RELEASE Left 05/2020    MYOMECTOMY   11/2022    ORTHOPEDIC SURGERY Left       CARPAL TUNNEL RELEASE            Social History               Socioeconomic History    Marital status: Single       Spouse name: Not on file    Number of children: Not on file    Years of education: Not on file    Highest education level: Not on file   Occupational History    Not on file   Tobacco Use    Smoking status: Never     Pulmonary effort is normal. No respiratory distress.      Breath sounds: Normal breath sounds. No stridor.   Chest:        Abdominal:      General: There is no distension.      Palpations: Abdomen is soft. There is no mass.      Tenderness: There is no abdominal tenderness.   Musculoskeletal:         General: Normal range of motion.      Cervical back: Normal range of motion and neck supple.   Lymphadenopathy:      Cervical: No cervical adenopathy.   Skin:     General: Skin is warm.   Neurological:      Mental Status: She is alert and oriented to person, place, and time.   Psychiatric:         Behavior: Behavior normal.         Thought Content: Thought content normal.         Judgment: Judgment normal.         Imaging & Procedures  US-GUIDED CORE BIOPSY  Following detailed explanation and description of the biopsy procedure, its risks, benefits and possible alternatives, the patient signed the informed consent.  Indication: Mass, Ultrasound Visible, LEFT breast   Prep: We cleansed the skin with alcohol.   Anesthesia: We anesthetized the skin and underlying tissues with 1% lidocaine with epinephrine.      Device: We advanced the BARD Marquee device through the lesion and captured tissue with real-time ultrasound confirmation.   Core Sampling: We repeated this sampling for the following number of cores, 2.   Marker: We placed a marking clip to mikal the biopsy site.   Marker Type: HYDROMark.   Dressing: We then closed the incision with steristrips and placed a sterile dressing.   Instructions: The patient was instructed regarding post-procedure care and activities.   Pathology: Pending at this time.      Patient tolerated procedure well and discharged in stable condition.    Informed patient that they will be notified of pathology results in 3 to 5 days.        ASSESSMENT and PLAN    Diagnosis Orders   1. Mass of lower outer quadrant of left breast  Surgical Pathology     Surgical Pathology          Established patient

## 2024-03-14 NOTE — DISCHARGE INSTRUCTIONS
Discharge Instructions from Dr. Rowell    I will call you with the pathology results, typically within 1 week from today.  You may shower, but no hot tubs, swimming pools, or baths until your incision is healed.  No heavy lifting with the affected extremity (nothing greater than 5 pounds), and limit its use for the next 4-5 days.  You may use an ice pack for comfort for the next couple of days, but do not place ice directly on the skin.  Rather, use a towel or clothing to serve as a barrier between skin and ice to prevent injury.  If I placed a drain, follow the drain instructions provided, especially as you keep a record of the drain output.  Follow medication instructions carefully.  Watch for signs of infection as listed below.  Redness  Swelling  Drainage from the incision or from your nipple that appears infected  Fever over 101 degrees for consecutive readings, or over 99.5 if you are currently undergoing chemotherapy.  Call our office (number is below) for a follow-up appointment.  If you have any problems, our phone number is [phone].        After general anesthesia or intravenous sedation, for 24 hours or while taking prescription Narcotics:  Limit your activities  Do not drive and operate hazardous machinery  Do not make important personal or business decisions  Do  not drink alcoholic beverages  If you have not urinated within 8 hours after discharge, please contact your surgeon on call.        The discharge information has been reviewed with the patient and parents.  The patient and parents verbalized understanding.  Discharge medications reviewed with the patient and parents and appropriate educational materials and side effects teaching were provided.  ___________________________________________________________________________________________________________________________________

## 2024-03-14 NOTE — ANESTHESIA POSTPROCEDURE EVALUATION
Post-Anesthesia Evaluation and Assessment    Patient: Anu Maya MRN: [id number]  SSN: [id number]    YOB: 1995  Age: 29 y.o.  Sex: female      I have evaluated the patient and they are stable and ready for discharge from the PACU.     Cardiovascular Function/Vital Signs  Visit Vitals  BP (!) 110/55   Pulse 83   Temp 97.7 °F (36.5 °C) (Oral)   Resp 17   Ht 1.702 m (5' 7.01\")   SpO2 99%   BMI 25.37 kg/m²       Patient is status post General anesthesia for Procedure(s):  LEFT BREAST EXCISIONAL BIOPSY WITH ULTRASOUND.    Nausea/Vomiting: None    Postoperative hydration reviewed and adequate.    Pain:      Managed    Neurological Status:       At baseline    Mental Status, Level of Consciousness: Alert and  oriented to person, place, and time    Pulmonary Status:       Adequate oxygenation and airway patent    Complications related to anesthesia: None    Post-anesthesia assessment completed. No concerns    Signed By: Wang Felton MD     March 14, 2024            Department of Anesthesiology  Postprocedure Note    Patient: Anu Maya  MRN: [id number]  YOB: 1995  Date of evaluation: 3/14/2024    Procedure Summary       Date: 03/14/24 Room / Location: [facility] ASU A2 / [facility] AMBULATORY OR    Anesthesia Start: 1251 Anesthesia Stop: 1408    Procedure: LEFT BREAST EXCISIONAL BIOPSY WITH ULTRASOUND (Left: Breast) Diagnosis:       Lump in lower outer quadrant of left breast      (Lump in lower outer quadrant of left breast [N63.23])    Surgeons: Butch Rowell Jr, MD Responsible Provider: Wang Felton MD    Anesthesia Type: general ASA Status: 2            Anesthesia Type: No value filed.    Christofer Phase I: Christofer Score: 9    Christofer Phase II:      Anesthesia Post Evaluation    No notable events documented.

## 2024-03-14 NOTE — BRIEF OP NOTE
Brief Postoperative Note      Patient: Anu Maya  YOB: 1995  MRN: [id number]    Date of Procedure: 3/14/2024    Pre-Op Diagnosis Codes:     * Lump in lower outer quadrant of left breast [N63.23]    Post-Op Diagnosis: Same       Procedure(s):  LEFT BREAST EXCISIONAL BIOPSY WITH ULTRASOUND    Surgeon(s):  Butch Rowell Jr, MD    Assistant:  Surgical Assistant: Randy Carter    Anesthesia: General    Estimated Blood Loss (mL): Minimal    Complications: None    Specimens:   ID Type Source Tests Collected by Time Destination   1 : LEFT BREAST EXCISIONAL BIOPSY Tissue Breast SURGICAL PATHOLOGY Butch Rowell Jr, MD 3/14/2024 1327    2 : LEFT BREAST EXCISIONAL BIOPSY SUPERIOR MARGIN Tissue Breast SURGICAL PATHOLOGY Butch Rowell Jr, MD 3/14/2024 1327        Implants:  * No implants in log *      Drains: * No LDAs found *    Findings: left breast mass  This procedure was not performed to treat primary cutaneous melanoma through wide local excision      Electronically signed by Butch Rowell MD on 3/14/2024 at 2:40 PM

## 2024-03-14 NOTE — ANESTHESIA PRE PROCEDURE
Department of Anesthesiology  Preprocedure Note       Name:  Anu Maya   Age:  29 y.o.  :  1995                                          MRN:  [id number]         Date:  3/14/2024      Surgeon: Surgeon(s):  Butch Rowell Jr, MD    Procedure: Procedure(s):  LEFT BREAST EXCISIONAL BIOPSY WITH ULTRASOUND    Medications prior to admission:   Prior to Admission medications    Medication Sig Start Date End Date Taking? Authorizing Provider   Levonorgestrel (KYLEENA) IUD 19.5 mg 1 each by IntraUTERine route once    ProviderRenato MD   Biotin 1 MG CAPS Take 1 tablet by mouth daily    Provider, MD Renato   linaclotide (LINZESS) 145 MCG capsule TAKE ONE CAPSULE BY MOUTH ONE TIME DAILY  Patient taking differently: as needed TAKE ONE CAPSULE BY MOUTH ONE TIME DAILY 23   Vonnie Mccall MD       Current medications:    No current facility-administered medications for this encounter.       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   • Asthma, exercise induced J45.990   • Herpes simplex of female genitalia A60.09   • Pap smear for cervical cancer screening Z12.4   • Irritable bowel syndrome with constipation K58.1   • Left carpal tunnel syndrome G56.02   • Leiomyoma of uterus D25.9   • Lump in lower outer quadrant of left breast N63.23       Past Medical History:        Diagnosis Date   • Asthma     EXERCISE AND WEATHER   • Bulging lumbar disc     L4-L5   • Herpes 2013   • Irritable bowel syndrome    • Lump in female breast     LOWER OUTER QUADRANT OF LEFT BREAST   • Migraine    • Migraine    • Other ill-defined conditions(799.89)     Left breast mass   • Reactive airway disease     exercise induced       Past Surgical History:        Procedure Laterality Date   • BREAST SURGERY Right     BENIGN MASSES  REMOVED   • CARPAL TUNNEL RELEASE Left 2020   • 
07/26/2023 07:54 AM    ALT 34 07/26/2023 07:54 AM       POC Tests: No results for input(s): \"POCGLU\", \"POCNA\", \"POCK\", \"POCCL\", \"POCBUN\", \"POCHEMO\", \"POCHCT\" in the last 72 hours.    Coags: No results found for: \"PROTIME\", \"INR\", \"APTT\"    HCG (If Applicable):   Lab Results   Component Value Date    PREGTESTUR Negative 03/14/2024        ABGs: No results found for: \"PHART\", \"PO2ART\", \"FVF1QEJ\", \"DYX4LBP\", \"BEART\", \"W5VKWAUB\"     Type & Screen (If Applicable):  No results found for: \"LABABO\", \"LABRH\"    Drug/Infectious Status (If Applicable):  Lab Results   Component Value Date/Time    HEPCAB NONREACTIVE 03/14/2022 08:46 AM       COVID-19 Screening (If Applicable):   Lab Results   Component Value Date/Time    COVID19 Negative 02/23/2021 08:53 AM    COVID19 Not Detected 05/23/2020 09:00 AM           Anesthesia Evaluation  Patient summary reviewed and Nursing notes reviewed  Airway: Mallampati: II  TM distance: >3 FB   Neck ROM: full  Mouth opening: > = 3 FB   Dental: normal exam         Pulmonary:normal exam    (+)           asthma:                            Cardiovascular:Negative CV ROS  Exercise tolerance: good (>4 METS)                    Neuro/Psych:   (+) headaches:            GI/Hepatic/Renal: Neg GI/Hepatic/Renal ROS            Endo/Other: Negative Endo/Other ROS                    Abdominal: normal exam            Vascular: negative vascular ROS.         Other Findings:       Anesthesia Plan      general     ASA 2       Induction: intravenous.    MIPS: Postoperative opioids intended and Prophylactic antiemetics administered.  Anesthetic plan and risks discussed with patient.      Plan discussed with CRNA.    Attending anesthesiologist reviewed and agrees with Preprocedure content            Wang Felton MD   3/14/2024

## 2024-03-14 NOTE — PERIOP NOTE
Pt stated that she was unable to void for many hours after her previous surgery and needed to have a castillo placed.  I gave the patient bags of LR (see MAR) to help hydrate and fill her bladder.  Bladder scanner showed 125ml in the bladder at 1610.  Pt stated that she had no pressure in her bladder and did not feel the need to void.  I suggested that walking around might stimulate her bladder.  Walked around [facility] with her for 20 minutes and she did feel the urge to void.  Pt was able to void in the toilet.  Post void bladder scanner showed 0mls in her bladder.    Pt feels comfortable going home and will continue to hydrate at home.    Pt discharged home with her mother and sister.

## 2024-03-15 NOTE — OP NOTE
[address]  [address]                            OPERATIVE REPORT      PATIENT NAME: RAGHAV VIEIRA             : 1995  MED REC NO: [id number]                       ROOM: [facility]  ACCOUNT NO: [id number]                       ADMIT DATE: 2024  PROVIDER: Butch Rowell Jr, MD    DATE OF SERVICE:  2024    PREOPERATIVE DIAGNOSES:  Fibroepithelial lesion of the left breast.    POSTOPERATIVE DIAGNOSES:  Fibroepithelial lesion of the left breast.    PROCEDURES PERFORMED:  Left breast biopsy with intraoperative ultrasound.    SURGEON:  Butch Rowell Jr, MD    ASSISTANT:  Randy Carter.    ANESTHESIA:  General.    ESTIMATED BLOOD LOSS:  Minimal.    SPECIMENS REMOVED:  Left breast mass with margins.    INTRAOPERATIVE FINDINGS:  Left breast mass.     COMPLICATIONS:  None.    IMPLANTS:  None.    INDICATIONS:  The patient is a 29-year-old female with a palpable left breast mass and biopsy revealing fibroepithelial lesion.  She is admitted for excision.    DESCRIPTION OF PROCEDURE:  After satisfactory induction of general LMA anesthesia, the patient was prepped and draped in sterile fashion.  Intraoperative ultrasound was performed.  The breast was marked.  An incision was made in the left inframammary fold and deepened through subcutaneous tissues with Bovie cautery.  The breast was lifted off the chest wall.  An incision was made in the posterior breast parenchyma.  The mass was identified and excised in its entirety with a combination of sharp and blunt dissection.  There was some apparent lesion that appeared possibly left in the superior margin, therefore additional superior margin was obtained.  All specimens were oriented and sent to Pathology. All dissection planes were hemostatic.  The wound was anesthestized with 0.5% Marcaine and was closed with interrupted 3-0 Vicryl and a running subcuticular 4-0 Monocryl on the skin.  The  patient tolerated the procedure well.  No complications.  She was taken to recovery room in stable condition.        UZMA WAKEFIELD JR, MD      JVANCE/AQS  D:  03/14/2024 14:46:02  T:  03/14/2024 18:07:09  JOB #:  [id number]    CC:   MD Johnny Barksdale MD Beth Du, MD Johnny Hyde Jr, MD

## 2024-03-18 ENCOUNTER — TELEPHONE (OUTPATIENT)
Age: 29
End: 2024-03-18

## 2024-03-18 RX ORDER — PREDNISONE 5 MG/1
TABLET ORAL
Qty: 21 EACH | Refills: 0 | Status: SHIPPED | OUTPATIENT
Start: 2024-03-18

## 2024-03-27 ENCOUNTER — OFFICE VISIT (OUTPATIENT)
Age: 29
End: 2024-03-27

## 2024-03-27 VITALS — BODY MASS INDEX: 25.43 KG/M2 | WEIGHT: 162 LBS | HEIGHT: 67 IN

## 2024-03-27 DIAGNOSIS — L23.89 ALLERGIC CONTACT DERMATITIS DUE TO OTHER AGENTS: Primary | ICD-10-CM

## 2024-03-27 PROCEDURE — 99024 POSTOP FOLLOW-UP VISIT: CPT | Performed by: SURGERY

## 2024-03-27 RX ORDER — PREDNISONE 20 MG/1
20 TABLET ORAL DAILY
Qty: 5 TABLET | Refills: 1 | Status: SHIPPED | OUTPATIENT
Start: 2024-03-27 | End: 2024-04-06

## 2024-03-27 NOTE — PROGRESS NOTES
HISTORY OF PRESENT ILLNESS  Anu Maya is a 29 y.o. female     HPI ESTABLISHED patient here for POD# 13, LEFT breast excisional biopsy.  Patient states she is having soreness as well as irritation to the LEFT breast most likely from Dermabond.     She describes her skin as dry, red, and itchy.     She is requesting a stronger dose of Prednisone.      Review of Systems      Physical Exam       ASSESSMENT and PLAN  {Assessment and Plan Chronic Disease:[id number]}    
Cervical: No cervical adenopathy.   Skin:     General: Skin is warm.   Neurological:      Mental Status: She is alert and oriented to person, place, and time.   Psychiatric:         Behavior: Behavior normal.         Thought Content: Thought content normal.         Judgment: Judgment normal.           ASSESSMENT and PLAN   Diagnosis Orders   1. Allergic contact dermatitis due to other agents          Patient presents for f/u s/p LEFT breast excisional biopsy, and is doing well overall. Patient has contact dermatitis on biopsy site, but otherwise her incision is well-healed. Will increase Prednisone dosage.     F/U PRN. This plan was reviewed with the patient and patient agrees. All questions were answered.    Mattie SNOWDEN, am scribing for and in the presence of Butch Rowell Jr, MD. 3/27/24/8:34 AM EDT    I, Dr. Rowell, personally performed the services described in this document as scribed by Shellie Nuñez in my presence, and it is both accurate and complete.

## 2024-03-28 NOTE — TELEPHONE ENCOUNTER
Rx sent to pharmacy as previously filled and verified by Verbal Order Read Back with provider.    ALYSSA 01-  NV 04/02/2024

## 2024-03-29 ENCOUNTER — TELEPHONE (OUTPATIENT)
Age: 29
End: 2024-03-29

## 2024-03-29 NOTE — TELEPHONE ENCOUNTER
Reason for call:  PT would like to know if her f/u appt on 4/2/2024  can be switched to a VV?    Is this a new problem: Yes    Date of last appointment:  1/8/2024     Can we respond via [employer]: No    Best call back number:    Anu Maya (Self) [phone] (Home)

## 2024-03-29 NOTE — TELEPHONE ENCOUNTER
Vonnie Mccall MD  [facility] Team Three7 minutes ago (2:27 PM)       Please let her know that unfortunately, I need to see her in person.  It has been a year since her last visit.      Vonnie Mccall MD

## 2024-04-02 ENCOUNTER — OFFICE VISIT (OUTPATIENT)
Age: 29
End: 2024-04-02
Payer: COMMERCIAL

## 2024-04-02 VITALS
OXYGEN SATURATION: 100 % | HEIGHT: 67 IN | HEART RATE: 71 BPM | WEIGHT: 165.4 LBS | SYSTOLIC BLOOD PRESSURE: 113 MMHG | RESPIRATION RATE: 15 BRPM | BODY MASS INDEX: 25.96 KG/M2 | TEMPERATURE: 98.2 F | DIASTOLIC BLOOD PRESSURE: 75 MMHG

## 2024-04-02 DIAGNOSIS — K58.1 IRRITABLE BOWEL SYNDROME WITH CONSTIPATION: Primary | ICD-10-CM

## 2024-04-02 PROBLEM — Z12.4 PAP SMEAR FOR CERVICAL CANCER SCREENING: Chronic | Status: ACTIVE | Noted: 2018-04-20

## 2024-04-02 PROCEDURE — 99213 OFFICE O/P EST LOW 20 MIN: CPT | Performed by: INTERNAL MEDICINE

## 2024-04-02 ASSESSMENT — PATIENT HEALTH QUESTIONNAIRE - PHQ9
1. LITTLE INTEREST OR PLEASURE IN DOING THINGS: NOT AT ALL
SUM OF ALL RESPONSES TO PHQ QUESTIONS 1-9: 0
SUM OF ALL RESPONSES TO PHQ QUESTIONS 1-9: 0
2. FEELING DOWN, DEPRESSED OR HOPELESS: NOT AT ALL
SUM OF ALL RESPONSES TO PHQ QUESTIONS 1-9: 0
SUM OF ALL RESPONSES TO PHQ QUESTIONS 1-9: 0
SUM OF ALL RESPONSES TO PHQ9 QUESTIONS 1 & 2: 0

## 2024-04-02 NOTE — PROGRESS NOTES
Assessment/Plan:     1. Irritable bowel syndrome with constipation  -doing well with use of linzess 145mg daily. No adjustments needed at this time.         Follow-up Disposition:     Scheduled for physical in August, 2024              Subjective:      Anu Maya is a 29 y.o. female who presents today for follow up of her ibs-c.    Since last visit 03/03/2023:  -had breast biopsy, left, with Dr. Collins, on 3/27/2024.  Having irritation from the cleansing topicals used at site of biopsy.  Was given prednisone.  Irritation improving.  Biopsy showed cellular fibroadenoma.    -taking linzess 145mg daily for ibs-c.  Having bowel movements daily.        Objective:     Wt Readings from Last 3 Encounters:   03/27/24 73.5 kg (162 lb)   03/11/24 73.5 kg (162 lb)   01/06/24 73.5 kg (162 lb)     BP Readings from Last 3 Encounters:   03/14/24 111/75   03/11/24 111/72   01/06/24 (!) 145/70     /75   Pulse 71   Temp 98.2 °F (36.8 °C)   Resp 15   Ht 1.702 m (5' 7\")   Wt 75 kg (165 lb 6.4 oz)   LMP 03/02/2024   SpO2 100%   BMI 25.91 kg/m²   General appearance: alert, appears stated age, and cooperative  Head: Normocephalic, without obvious abnormality, atraumatic  Neck: no adenopathy, no carotid bruit, no JVD, supple, symmetrical, trachea midline, and thyroid not enlarged, symmetric, no tenderness/mass/nodules  Lungs: clear to auscultation bilaterally  Heart: regular rate and rhythm, S1, S2 normal, no murmur, click, rub or gallop  Abdomen: soft, non-tender; bowel sounds normal; no masses,  no organomegaly  Extremities: extremities normal, atraumatic, no cyanosis or edema

## 2024-04-17 ENCOUNTER — TELEMEDICINE (OUTPATIENT)
Age: 29
End: 2024-04-17
Payer: COMMERCIAL

## 2024-04-17 DIAGNOSIS — R21 RASH: Primary | ICD-10-CM

## 2024-04-17 DIAGNOSIS — T78.40XD ALLERGIC REACTION, SUBSEQUENT ENCOUNTER: ICD-10-CM

## 2024-04-17 PROCEDURE — 99213 OFFICE O/P EST LOW 20 MIN: CPT | Performed by: NURSE PRACTITIONER

## 2024-04-17 RX ORDER — CLOTRIMAZOLE 1 %
CREAM (GRAM) TOPICAL
Qty: 60 G | Refills: 1 | Status: SHIPPED | OUTPATIENT
Start: 2024-04-17 | End: 2024-04-24

## 2024-04-17 NOTE — PROGRESS NOTES
exercise induced    Herpes simplex of female genitalia    Pap smear for cervical cancer screening    Irritable bowel syndrome with constipation    Left carpal tunnel syndrome    Leiomyoma of uterus    Lump in lower outer quadrant of left breast     Patient Active Problem List    Diagnosis Date Noted    Lump in lower outer quadrant of left breast 01/22/2024    Leiomyoma of uterus 11/16/2022    Irritable bowel syndrome with constipation 12/02/2020    Left carpal tunnel syndrome 05/27/2020    Pap smear for cervical cancer screening 04/20/2018    Limb tremor 11/13/2014    Herpes simplex of female genitalia 05/31/2013    Asthma, exercise induced 10/15/2011     Current Outpatient Medications   Medication Sig Dispense Refill    clotrimazole (LOTRIMIN AF) 1 % cream Apply topically 2 times daily. 60 g 1    linaclotide (LINZESS) 145 MCG capsule TAKE ONE CAPSULE BY MOUTH DAILY 90 capsule 0    Levonorgestrel (KYLEENA) IUD 19.5 mg 1 each by IntraUTERine route once      Biotin 1 MG CAPS Take 1 tablet by mouth daily       No current facility-administered medications for this visit.     Allergies   Allergen Reactions    Betadine [Povidone-Iodine] Rash    Chlorhexidine Dermatitis, Itching and Rash     Past Medical History:   Diagnosis Date    Asthma     EXERCISE AND WEATHER    Bulging lumbar disc     L4-L5    Herpes 03/2013    Irritable bowel syndrome     Lump in female breast 2024    LOWER OUTER QUADRANT OF LEFT BREAST    Migraine     Migraine     Other ill-defined conditions(799.89)     Left breast mass    Reactive airway disease     exercise induced     Past Surgical History:   Procedure Laterality Date    BREAST SURGERY Right 2012    BENIGN MASSES  REMOVED    BREAST SURGERY Left 3/14/2024    LEFT BREAST EXCISIONAL BIOPSY WITH ULTRASOUND performed by Butch Rowell Jr, MD at [facility] AMBULATORY OR    CARPAL TUNNEL RELEASE Left 05/2020    MYOMECTOMY  11/2022    ORTHOPEDIC SURGERY Left     CARPAL TUNNEL RELEASE       Review of

## 2024-04-30 ENCOUNTER — PATIENT MESSAGE (OUTPATIENT)
Age: 29
End: 2024-04-30

## 2024-07-12 NOTE — TELEPHONE ENCOUNTER
Rx sent to pharmacy as previously filled and verified by Verbal Order Read Back with provider.    NV 09/11/2024

## 2024-09-11 ENCOUNTER — PATIENT MESSAGE (OUTPATIENT)
Age: 29
End: 2024-09-11

## 2024-09-11 ENCOUNTER — OFFICE VISIT (OUTPATIENT)
Age: 29
End: 2024-09-11
Payer: COMMERCIAL

## 2024-09-11 VITALS
WEIGHT: 160.8 LBS | OXYGEN SATURATION: 100 % | RESPIRATION RATE: 14 BRPM | HEART RATE: 68 BPM | SYSTOLIC BLOOD PRESSURE: 90 MMHG | BODY MASS INDEX: 25.24 KG/M2 | TEMPERATURE: 97.3 F | DIASTOLIC BLOOD PRESSURE: 70 MMHG | HEIGHT: 67 IN

## 2024-09-11 DIAGNOSIS — K58.1 IRRITABLE BOWEL SYNDROME WITH CONSTIPATION: ICD-10-CM

## 2024-09-11 DIAGNOSIS — Z00.00 ANNUAL PHYSICAL EXAM: ICD-10-CM

## 2024-09-11 DIAGNOSIS — Z00.00 ANNUAL PHYSICAL EXAM: Primary | ICD-10-CM

## 2024-09-11 PROCEDURE — 99395 PREV VISIT EST AGE 18-39: CPT | Performed by: INTERNAL MEDICINE

## 2024-09-12 LAB
ALBUMIN SERPL-MCNC: 4.2 G/DL (ref 3.5–5)
ALBUMIN/GLOB SERPL: 1.4 (ref 1.1–2.2)
ALP SERPL-CCNC: 45 U/L (ref 45–117)
ALT SERPL-CCNC: 29 U/L (ref 12–78)
ANION GAP SERPL CALC-SCNC: 5 MMOL/L (ref 2–12)
APPEARANCE UR: ABNORMAL
AST SERPL-CCNC: 25 U/L (ref 15–37)
BACTERIA URNS QL MICRO: NEGATIVE /HPF
BASOPHILS # BLD: 0 K/UL (ref 0–0.1)
BASOPHILS NFR BLD: 1 % (ref 0–1)
BILIRUB SERPL-MCNC: 0.5 MG/DL (ref 0.2–1)
BILIRUB UR QL: NEGATIVE
BUN SERPL-MCNC: 12 MG/DL (ref 6–20)
BUN/CREAT SERPL: 18 (ref 12–20)
CALCIUM SERPL-MCNC: 9.2 MG/DL (ref 8.5–10.1)
CHLORIDE SERPL-SCNC: 108 MMOL/L (ref 97–108)
CHOLEST SERPL-MCNC: 151 MG/DL
CO2 SERPL-SCNC: 26 MMOL/L (ref 21–32)
COLOR UR: ABNORMAL
CREAT SERPL-MCNC: 0.68 MG/DL (ref 0.55–1.02)
DIFFERENTIAL METHOD BLD: ABNORMAL
EOSINOPHIL # BLD: 0 K/UL (ref 0–0.4)
EOSINOPHIL NFR BLD: 1 % (ref 0–7)
EPITH CASTS URNS QL MICRO: ABNORMAL /LPF
ERYTHROCYTE [DISTWIDTH] IN BLOOD BY AUTOMATED COUNT: 12.8 % (ref 11.5–14.5)
EST. AVERAGE GLUCOSE BLD GHB EST-MCNC: 91 MG/DL
GLOBULIN SER CALC-MCNC: 3 G/DL (ref 2–4)
GLUCOSE SERPL-MCNC: 82 MG/DL (ref 65–100)
GLUCOSE UR STRIP.AUTO-MCNC: NEGATIVE MG/DL
HBA1C MFR BLD: 4.8 % (ref 4–5.6)
HCT VFR BLD AUTO: 41.6 % (ref 35–47)
HDLC SERPL-MCNC: 91 MG/DL
HDLC SERPL: 1.7 (ref 0–5)
HGB BLD-MCNC: 13.4 G/DL (ref 11.5–16)
HGB UR QL STRIP: ABNORMAL
HYALINE CASTS URNS QL MICRO: ABNORMAL /LPF (ref 0–5)
IMM GRANULOCYTES # BLD AUTO: 0 K/UL (ref 0–0.04)
IMM GRANULOCYTES NFR BLD AUTO: 0 % (ref 0–0.5)
KETONES UR QL STRIP.AUTO: NEGATIVE MG/DL
LDLC SERPL CALC-MCNC: 50.8 MG/DL (ref 0–100)
LEUKOCYTE ESTERASE UR QL STRIP.AUTO: NEGATIVE
LYMPHOCYTES # BLD: 1.7 K/UL (ref 0.8–3.5)
LYMPHOCYTES NFR BLD: 52 % (ref 12–49)
MCH RBC QN AUTO: 28 PG (ref 26–34)
MCHC RBC AUTO-ENTMCNC: 32.2 G/DL (ref 30–36.5)
MCV RBC AUTO: 86.8 FL (ref 80–99)
MONOCYTES # BLD: 0.3 K/UL (ref 0–1)
MONOCYTES NFR BLD: 11 % (ref 5–13)
NEUTS SEG # BLD: 1.1 K/UL (ref 1.8–8)
NEUTS SEG NFR BLD: 35 % (ref 32–75)
NITRITE UR QL STRIP.AUTO: NEGATIVE
NRBC # BLD: 0 K/UL (ref 0–0.01)
NRBC BLD-RTO: 0 PER 100 WBC
PH UR STRIP: 5.5 (ref 5–8)
PLATELET # BLD AUTO: 298 K/UL (ref 150–400)
PMV BLD AUTO: 10.5 FL (ref 8.9–12.9)
POTASSIUM SERPL-SCNC: 4.4 MMOL/L (ref 3.5–5.1)
PROT SERPL-MCNC: 7.2 G/DL (ref 6.4–8.2)
PROT UR STRIP-MCNC: NEGATIVE MG/DL
RBC # BLD AUTO: 4.79 M/UL (ref 3.8–5.2)
RBC #/AREA URNS HPF: ABNORMAL /HPF (ref 0–5)
SODIUM SERPL-SCNC: 139 MMOL/L (ref 136–145)
SP GR UR REFRACTOMETRY: 1.02 (ref 1–1.03)
TRIGL SERPL-MCNC: 46 MG/DL
URINE CULTURE IF INDICATED: ABNORMAL
UROBILINOGEN UR QL STRIP.AUTO: 0.2 EU/DL (ref 0.2–1)
VLDLC SERPL CALC-MCNC: 9.2 MG/DL
WBC # BLD AUTO: 3.2 K/UL (ref 3.6–11)
WBC URNS QL MICRO: ABNORMAL /HPF (ref 0–4)

## 2024-09-13 ENCOUNTER — TELEPHONE (OUTPATIENT)
Age: 29
End: 2024-09-13

## 2024-09-13 DIAGNOSIS — Z00.00 ANNUAL PHYSICAL EXAM: ICD-10-CM

## 2024-09-13 DIAGNOSIS — K58.1 IRRITABLE BOWEL SYNDROME WITH CONSTIPATION: ICD-10-CM

## 2024-09-13 LAB — TSH SERPL DL<=0.05 MIU/L-ACNC: 1.34 UIU/ML (ref 0.36–3.74)

## 2024-10-19 ENCOUNTER — IMMUNIZATION (OUTPATIENT)
Age: 29
End: 2024-10-19

## 2024-10-19 PROCEDURE — 90471 IMMUNIZATION ADMIN: CPT | Performed by: PEDIATRICS

## 2024-10-19 PROCEDURE — 90661 CCIIV3 VAC ABX FR 0.5 ML IM: CPT | Performed by: PEDIATRICS

## 2025-02-10 NOTE — TELEPHONE ENCOUNTER
Last office visit 9/11/24  Next Appt  With Internal Medicine (Vonnie Mccall MD)  09/12/2025 at 8:00 AM    Last fill on linzess 10/29/24 #90 with no additional refills

## 2025-02-11 ENCOUNTER — TELEPHONE (OUTPATIENT)
Age: 30
End: 2025-02-11

## 2025-02-11 NOTE — TELEPHONE ENCOUNTER
Reason for call:  pt is calling to follow up on her request for a refill on linzess.  Please call and advise, pt requested on 02/08/25    Is this a new problem: Yes    Date of last appointment:  9/11/2024     Can we respond via [employer]: No    Best call back number:   Anu Maya RONNIE (Self) [phone] (Home)

## 2025-06-29 SDOH — ECONOMIC STABILITY: TRANSPORTATION INSECURITY
IN THE PAST 12 MONTHS, HAS THE LACK OF TRANSPORTATION KEPT YOU FROM MEDICAL APPOINTMENTS OR FROM GETTING MEDICATIONS?: NO

## 2025-06-29 SDOH — ECONOMIC STABILITY: INCOME INSECURITY: IN THE LAST 12 MONTHS, WAS THERE A TIME WHEN YOU WERE NOT ABLE TO PAY THE MORTGAGE OR RENT ON TIME?: NO

## 2025-06-29 SDOH — ECONOMIC STABILITY: FOOD INSECURITY: WITHIN THE PAST 12 MONTHS, THE FOOD YOU BOUGHT JUST DIDN'T LAST AND YOU DIDN'T HAVE MONEY TO GET MORE.: NEVER TRUE

## 2025-06-29 SDOH — ECONOMIC STABILITY: FOOD INSECURITY: WITHIN THE PAST 12 MONTHS, YOU WORRIED THAT YOUR FOOD WOULD RUN OUT BEFORE YOU GOT MONEY TO BUY MORE.: NEVER TRUE

## 2025-07-02 ENCOUNTER — OFFICE VISIT (OUTPATIENT)
Age: 30
End: 2025-07-02
Payer: COMMERCIAL

## 2025-07-02 VITALS
OXYGEN SATURATION: 98 % | DIASTOLIC BLOOD PRESSURE: 73 MMHG | WEIGHT: 161.4 LBS | BODY MASS INDEX: 25.33 KG/M2 | HEIGHT: 67 IN | RESPIRATION RATE: 15 BRPM | TEMPERATURE: 98.2 F | SYSTOLIC BLOOD PRESSURE: 106 MMHG | HEART RATE: 76 BPM

## 2025-07-02 DIAGNOSIS — R23.3 EASY BRUISING: ICD-10-CM

## 2025-07-02 DIAGNOSIS — R23.3 EASY BRUISING: Primary | ICD-10-CM

## 2025-07-02 LAB
BASOPHILS # BLD: 0.03 K/UL (ref 0–0.1)
BASOPHILS NFR BLD: 0.8 % (ref 0–1)
DIFFERENTIAL METHOD BLD: ABNORMAL
EOSINOPHIL # BLD: 0.11 K/UL (ref 0–0.4)
EOSINOPHIL NFR BLD: 3 % (ref 0–7)
ERYTHROCYTE [DISTWIDTH] IN BLOOD BY AUTOMATED COUNT: 13 % (ref 11.5–14.5)
HCT VFR BLD AUTO: 39.3 % (ref 35–47)
HGB BLD-MCNC: 13 G/DL (ref 11.5–16)
IMM GRANULOCYTES # BLD AUTO: 0 K/UL (ref 0–0.04)
IMM GRANULOCYTES NFR BLD AUTO: 0 % (ref 0–0.5)
INR PPP: 1 (ref 0.9–1.1)
LYMPHOCYTES # BLD: 1.87 K/UL (ref 0.8–3.5)
LYMPHOCYTES NFR BLD: 51.4 % (ref 12–49)
MCH RBC QN AUTO: 27.9 PG (ref 26–34)
MCHC RBC AUTO-ENTMCNC: 33.1 G/DL (ref 30–36.5)
MCV RBC AUTO: 84.3 FL (ref 80–99)
MONOCYTES # BLD: 0.37 K/UL (ref 0–1)
MONOCYTES NFR BLD: 10.2 % (ref 5–13)
NEUTS SEG # BLD: 1.26 K/UL (ref 1.8–8)
NEUTS SEG NFR BLD: 34.6 % (ref 32–75)
NRBC # BLD: 0 K/UL (ref 0–0.01)
NRBC BLD-RTO: 0 PER 100 WBC
PLATELET # BLD AUTO: 284 K/UL (ref 150–400)
PMV BLD AUTO: 9.5 FL (ref 8.9–12.9)
PROTHROMBIN TIME: 10.9 SEC (ref 9.2–11.2)
RBC # BLD AUTO: 4.66 M/UL (ref 3.8–5.2)
WBC # BLD AUTO: 3.6 K/UL (ref 3.6–11)

## 2025-07-02 PROCEDURE — 99213 OFFICE O/P EST LOW 20 MIN: CPT | Performed by: NURSE PRACTITIONER

## 2025-07-02 ASSESSMENT — PATIENT HEALTH QUESTIONNAIRE - PHQ9
SUM OF ALL RESPONSES TO PHQ QUESTIONS 1-9: 0
SUM OF ALL RESPONSES TO PHQ QUESTIONS 1-9: 0
1. LITTLE INTEREST OR PLEASURE IN DOING THINGS: NOT AT ALL
SUM OF ALL RESPONSES TO PHQ QUESTIONS 1-9: 0
SUM OF ALL RESPONSES TO PHQ QUESTIONS 1-9: 0
2. FEELING DOWN, DEPRESSED OR HOPELESS: NOT AT ALL

## 2025-07-02 ASSESSMENT — ENCOUNTER SYMPTOMS: ROS SKIN COMMENTS: BRUISING

## 2025-07-02 NOTE — PROGRESS NOTES
Anu Maya (:  1995) is a 30 y.o. female,here for evaluation of the following chief complaint(s):  Bleeding/Bruising    /73   Pulse 76   Temp 98.2 °F (36.8 °C)   Resp 15   Ht 1.702 m (5' 7\")   Wt 73.2 kg (161 lb 6.4 oz)   SpO2 98%   BMI 25.28 kg/m²       SUBJECTIVE/OBJECTIVE:    HPI:Patient reports increased bruising with no known injury or trauma for 2 years, but worse in the past 6-8 months. Bruises are primarily on arms and legs. They were initially lower legs only but now progressed to thighs. They are tender and take about 2 weeks to resolve. No daily supplements. No consistent use of NSAIDS. No family history of blood disorders.    Review of Systems   Skin:         bruising       Physical Exam  Constitutional:       Appearance: Normal appearance.   Pulmonary:      Effort: Pulmonary effort is normal.   Musculoskeletal:         General: Normal range of motion.   Skin:     Comments: Scattered bruising of extremities   Neurological:      General: No focal deficit present.      Mental Status: She is alert and oriented to person, place, and time.   Psychiatric:         Mood and Affect: Mood normal.         Behavior: Behavior normal.          ASSESSMENT/PLAN:  1. Easy bruising  -     CBC with Auto Differential; Future  -     Protime-INR; Future  -     APTT; Future  Labs ordered  Plan will depend on results    --TINO Mackey - ARLEEN

## 2025-07-03 ENCOUNTER — TELEPHONE (OUTPATIENT)
Age: 30
End: 2025-07-03

## 2025-07-03 ENCOUNTER — RESULTS FOLLOW-UP (OUTPATIENT)
Age: 30
End: 2025-07-03

## 2025-07-03 ENCOUNTER — PATIENT MESSAGE (OUTPATIENT)
Age: 30
End: 2025-07-03

## 2025-07-03 DIAGNOSIS — R23.3 EASY BRUISING: ICD-10-CM

## 2025-07-03 DIAGNOSIS — R76.8 POSITIVE ANA (ANTINUCLEAR ANTIBODY): ICD-10-CM

## 2025-07-03 DIAGNOSIS — R23.3 EASY BRUISING: Primary | ICD-10-CM

## 2025-07-03 NOTE — TELEPHONE ENCOUNTER
----- Message from MELANY BANKS LPN sent at 7/3/2025  2:26 PM EDT -----  Do you want direct or indirect?

## 2025-07-03 NOTE — TELEPHONE ENCOUNTER
----- Message from GABE CABRALES MA sent at 7/3/2025  3:47 PM EDT -----  Regarding: FW: LAB SPECIMEN PROBLEM  Contact: [phone]    ----- Message -----  From: Sheryl Gamez LPN  Sent: 7/3/2025   2:50 PM EDT  To: [facility] Team Three  Subject: FW: LAB SPECIMEN PROBLEM                           ----- Message -----  From: Savanah Abraham  Sent: 7/3/2025  10:57 AM EDT  To: [facility] Client Services Staff Pool; #  Subject: LAB SPECIMEN PROBLEM                             Please see information below for details regarding a problem with samples received at [facility].    Patient Name: Anu Maya    Patient : 1995    Tests Affected:  PTT     Description of Problem: [facility] lab error-test not accessioned with PT, sample too old once error discovered    Please place a new order and Client Services will contact the patient for recollection.    If you have any questions please call [phone] and a representative will assist you.  Thank you,  [facility] Client Services

## 2025-07-08 DIAGNOSIS — R23.3 EASY BRUISING: ICD-10-CM

## 2025-07-09 LAB
APTT PPP: 28.6 SEC (ref 22.1–31)
CRP SERPL-MCNC: 1.06 MG/DL (ref 0–0.3)
ERYTHROCYTE [SEDIMENTATION RATE] IN BLOOD: 4 MM/HR (ref 0–20)
THERAPEUTIC RANGE: NORMAL SECS (ref 58–77)

## 2025-07-10 LAB
ANA TITR SER IF: POSITIVE
LABORATORY COMMENT REPORT: ABNORMAL

## 2025-07-11 ENCOUNTER — TELEPHONE (OUTPATIENT)
Age: 30
End: 2025-07-11

## 2025-07-11 NOTE — TELEPHONE ENCOUNTER
*PENDING REVIEW*, Received referral request, referral is pending review from provider will contact patient once we are given the ok to schedule New Patient Hallie.

## 2025-07-27 ASSESSMENT — RHEUMATOLOGY NEW PATIENT QUESTIONNAIRE
COUGH: N
SEIZURES: N
HEADACHES: N
STOMACH PAIN: N
UNUSUAL BLEEDING: Y
SWOLLEN OR TENDER GLANDS: N
DIFFICULTY STAYING ASLEEP: N
RASH: Y
DOUBLE OR BLURRED VISION: N
SUN SENSITIVE (SUN ALLERGY): N
BEHAVIORAL CHANGES: N
JOINT SWELLING: N
EYE REDNESS: N
COLOR CHANGES OF HANDS OR FEET IN THE COLD: N
NAUSEA: N
VAGINAL DRYNESS: N
SORES IN MOUTH OR NOSE: N
ABNORMAL URINE: N
UNUSUALLY RAPID OR SLOWED HEART RATE: N
SHORTNESS OF BREATH: N
SKIN TIGHTNESS: N
SKIN REDNESS: Y
UNEXPLAINED WEIGHT CHANGE: N
ANEMIA: N
EYE DRYNESS: N
JOINT PAIN: N
DIFFICULTY FALLING ASLEEP: N
HOARSE VOICE: N
MORNING STIFFNESS: N
NUMBNESS OR TINGLING IN HANDS OR FEET: N
NIGHT SWEATS: Y
BLOOD IN STOOLS: N
CHEST PAIN: N
VOMITING OF BLOOD OR COFFEE GROUND CONSISTENCY MATERIAL: N
PERSISTENT DIARRHEA: N
RASH OR ULCERS: N
EASY BRUISING: Y
NODULES/BUMPS: N
SWOLLEN LEGS OR FEET: N
EXCESSIVE HAIR LOSS (MORE THAN YOUR NORM): N
PAIN OR BURNING ON URINATION: N
FEVER: N
DEPRESSION: N
UNUSUAL FATIGUE: Y
LOSS OF VISION: N
ANXIETY: N
EYE PAIN: N
MUSCLE WEAKNESS: N
MEMORY LOSS: N
INCREASED SUSCEPTIBILITY TO INFECTION: N
JAUNDICE: N
HEARTBURN OR REFLUX: N
FAINTING: N
LOSS OF CONSCIOUSNESS: N
BLACK STOOLS: N
AGITATION: N
DIFFICULTY BREATHING LYING DOWN: N
UNEXPLAINED HEARING LOSS: N
DIFFICULTY SWALLOWING: N
DRYNESS OF MOUTH: N
EASILY LOSING TEMPER: N

## 2025-07-28 LAB
CHOLEST SERPL-MCNC: 147 MG/DL
GLUCOSE SERPL-MCNC: 95 MG/DL (ref 65–100)
HDLC SERPL-MCNC: 87 MG/DL
LDLC SERPL CALC-MCNC: 51 MG/DL (ref 0–100)
TRIGL SERPL-MCNC: 45 MG/DL

## 2025-07-29 ASSESSMENT — ENCOUNTER SYMPTOMS
COLOR CHANGE: 0
ABDOMINAL PAIN: 0
COUGH: 0
TROUBLE SWALLOWING: 0
BLOOD IN STOOL: 0
NAUSEA: 0
SHORTNESS OF BREATH: 0
DIARRHEA: 0
SORE THROAT: 0
VOMITING: 0
EYE REDNESS: 0
EYE PAIN: 0

## 2025-07-30 ENCOUNTER — HOSPITAL ENCOUNTER (OUTPATIENT)
Age: 30
Discharge: HOME | End: 2025-08-02
Payer: COMMERCIAL

## 2025-07-30 ENCOUNTER — OFFICE VISIT (OUTPATIENT)
Age: 30
End: 2025-07-30
Payer: COMMERCIAL

## 2025-07-30 VITALS
RESPIRATION RATE: 16 BRPM | DIASTOLIC BLOOD PRESSURE: 74 MMHG | OXYGEN SATURATION: 99 % | TEMPERATURE: 97.3 F | HEART RATE: 60 BPM | SYSTOLIC BLOOD PRESSURE: 112 MMHG | WEIGHT: 163 LBS | BODY MASS INDEX: 25.53 KG/M2

## 2025-07-30 DIAGNOSIS — R76.8 ANA POSITIVE: ICD-10-CM

## 2025-07-30 DIAGNOSIS — Z01.89 ENCOUNTER FOR OTHER SPECIFIED SPECIAL EXAMINATIONS: ICD-10-CM

## 2025-07-30 DIAGNOSIS — R61 NIGHT SWEATS: ICD-10-CM

## 2025-07-30 DIAGNOSIS — R76.8 ANA POSITIVE: Primary | ICD-10-CM

## 2025-07-30 DIAGNOSIS — M79.10 MYALGIA: ICD-10-CM

## 2025-07-30 DIAGNOSIS — R23.3 EASY BRUISING: ICD-10-CM

## 2025-07-30 DIAGNOSIS — E55.9 HYPOVITAMINOSIS D: ICD-10-CM

## 2025-07-30 PROCEDURE — 99204 OFFICE O/P NEW MOD 45 MIN: CPT | Performed by: NURSE PRACTITIONER

## 2025-07-30 PROCEDURE — 71046 X-RAY EXAM CHEST 2 VIEWS: CPT

## 2025-07-30 ASSESSMENT — PATIENT HEALTH QUESTIONNAIRE - PHQ9
SUM OF ALL RESPONSES TO PHQ QUESTIONS 1-9: 0
2. FEELING DOWN, DEPRESSED OR HOPELESS: NOT AT ALL
1. LITTLE INTEREST OR PLEASURE IN DOING THINGS: NOT AT ALL

## 2025-07-30 ASSESSMENT — ENCOUNTER SYMPTOMS: CONSTIPATION: 1

## 2025-07-30 NOTE — PATIENT INSTRUCTIONS
Thank you for visiting [facility]!      For future medication refills, we require updated lab results and an upcoming appointment to be in our system to refill prescriptions.  Without these two things, your refill will be DENIED.  If you miss your upcoming appointment, your refill will also be DENIED.      We appreciate your understanding of this critical clinical aspect of our practice. - ARLEEN Linder

## 2025-07-31 LAB
25(OH)D3 SERPL-MCNC: 25.7 NG/ML (ref 30–100)
ALBUMIN SERPL-MCNC: 4.4 G/DL (ref 3.5–5.2)
ALBUMIN/GLOB SERPL: 1.6 (ref 1.1–2.2)
ALP SERPL-CCNC: 52 U/L (ref 35–104)
ALT SERPL-CCNC: 27 U/L (ref 10–35)
ANION GAP SERPL CALC-SCNC: 12 MMOL/L (ref 2–14)
APPEARANCE UR: CLEAR
AST SERPL-CCNC: 24 U/L (ref 10–35)
BACTERIA URNS QL MICRO: NEGATIVE /HPF
BILIRUB SERPL-MCNC: 0.4 MG/DL (ref 0–1.2)
BILIRUB UR QL: NEGATIVE
BUN SERPL-MCNC: 11 MG/DL (ref 6–20)
BUN/CREAT SERPL: 18 (ref 12–20)
CALCIUM SERPL-MCNC: 10 MG/DL (ref 8.6–10)
CHLORIDE SERPL-SCNC: 102 MMOL/L (ref 98–107)
CK SERPL-CCNC: 56 U/L (ref 26–192)
CO2 SERPL-SCNC: 26 MMOL/L (ref 20–29)
COLOR UR: ABNORMAL
CREAT SERPL-MCNC: 0.63 MG/DL (ref 0.6–1)
CREAT UR-MCNC: 105 MG/DL (ref 28–217)
CRP SERPL-MCNC: <0.3 MG/DL (ref 0–0.5)
EPITH CASTS URNS QL MICRO: ABNORMAL /LPF
ERYTHROCYTE [SEDIMENTATION RATE] IN BLOOD: 2 MM/HR (ref 0–20)
GLOBULIN SER CALC-MCNC: 2.7 G/DL (ref 2–4)
GLUCOSE SERPL-MCNC: 82 MG/DL (ref 65–100)
GLUCOSE UR STRIP.AUTO-MCNC: NEGATIVE MG/DL
HAV IGM SER QL: NONREACTIVE
HBV CORE IGM SER QL: NONREACTIVE
HBV SURFACE AG SER QL: NONREACTIVE
HCV AB SER QL: NONREACTIVE
HGB UR QL STRIP: ABNORMAL
KETONES UR QL STRIP.AUTO: NEGATIVE MG/DL
LDH SERPL L TO P-CCNC: 175 U/L (ref 135–246)
LEUKOCYTE ESTERASE UR QL STRIP.AUTO: NEGATIVE
MAGNESIUM SERPL-MCNC: 2 MG/DL (ref 1.6–2.6)
NITRITE UR QL STRIP.AUTO: NEGATIVE
PH UR STRIP: 6 (ref 5–8)
POTASSIUM SERPL-SCNC: 4.5 MMOL/L (ref 3.5–5.1)
PROT SERPL-MCNC: 7 G/DL (ref 6.4–8.3)
PROT UR STRIP-MCNC: NEGATIVE MG/DL
PROT UR-MCNC: <6 MG/DL
PROT/CREAT UR-RTO: NORMAL
RBC #/AREA URNS HPF: ABNORMAL /HPF (ref 0–5)
SODIUM SERPL-SCNC: 139 MMOL/L (ref 136–145)
SP GR UR REFRACTOMETRY: 1.02 (ref 1–1.03)
TSH, 3RD GENERATION: 1.08 UIU/ML (ref 0.27–4.2)
UROBILINOGEN UR QL STRIP.AUTO: 0.2 EU/DL (ref 0.2–1)
WBC URNS QL MICRO: ABNORMAL /HPF (ref 0–4)

## 2025-08-01 LAB
ACE SERPL-CCNC: 56 U/L (ref 14–82)
ALDOLASE SERPL-CCNC: 3.4 U/L (ref 3.3–10.3)
ANA SPECKLED TITR SER: ABNORMAL {TITER}
ANA TITR SER IF: POSITIVE
APTT SCREEN TO CONFIRM RATIO: 1.3 RATIO (ref 0–1.34)
C3 SERPL-MCNC: 166 MG/DL (ref 82–167)
C4 SERPL-MCNC: 29 MG/DL (ref 12–38)
CCP IGA+IGG SERPL IA-ACNC: 4 UNITS (ref 0–19)
CENTROMERE B AB SER-ACNC: <0.2 AI (ref 0–0.9)
CHROMATIN AB SERPL-ACNC: <0.2 AI (ref 0–0.9)
CONFIRM APTT/NORMAL: 43.3 SEC (ref 0–47.6)
DSDNA AB SER-ACNC: <1 IU/ML (ref 0–9)
ENA JO1 AB SER-ACNC: <0.2 AI (ref 0–0.9)
ENA RNP AB SER-ACNC: <0.2 AI (ref 0–0.9)
ENA SCL70 AB SER-ACNC: <0.2 AI (ref 0–0.9)
ENA SM AB SER-ACNC: <0.2 AI (ref 0–0.9)
ENA SM+RNP AB SER-ACNC: <0.2 AI (ref 0–0.9)
ENA SS-A AB SER-ACNC: <0.2 AI (ref 0–0.9)
ENA SS-B AB SER-ACNC: <0.2 AI (ref 0–0.9)
G6PD BLD QN: 225 U/10E12 RBC (ref 127–427)
LA 2 SCREEN W REFLEX-IMP: ABNORMAL
NOTE: ABNORMAL
RBC # BLD AUTO: 4.85 X10E6/UL (ref 3.77–5.28)
RIBOSOMAL P AB SER-ACNC: <0.2 AI (ref 0–0.9)
SCREEN APTT: 35.5 SEC (ref 0–43.5)
SCREEN DRVVT: 36.8 SEC (ref 0–47)
SEE BELOW:, 164879: NORMAL
THROMBIN TIME: 23.3 SEC (ref 0–23)

## 2025-08-04 LAB — DSDNA AB SER FARR-ACNC: <8 IU/ML

## 2025-08-05 LAB
ANA SPECKLED TITR SER: ABNORMAL {TITER}
ANA TITR SER IF: POSITIVE
GAMMA INTERFERON BACKGROUND BLD IA-ACNC: 0.13 IU/ML
M TB IFN-G BLD-IMP: NEGATIVE
M TB IFN-G CD4+ BCKGRND COR BLD-ACNC: 0.12 IU/ML
M TB IFN-G CD4+CD8+ BCKGRND COR BLD-ACNC: 0.11 IU/ML
MITOGEN IGNF BCKGRD COR BLD-ACNC: >10 IU/ML
NOTE: ABNORMAL
QUANTIFERON, INCUBATION: NORMAL
RHEUMATOID FACTOR: <14 IU/ML
SERVICE CMNT-IMP: NORMAL

## 2025-08-14 LAB
ANA SPECKLED TITR SER: ABNORMAL {TITER}
ANA TITR SER IF: POSITIVE
APTT HEX PL PPP: 5 SEC
APTT IMM NP PPP: NORMAL SEC
APTT PPP 1:1 SALINE: NORMAL SEC
APTT PPP: 28.6 SEC
B2 GLYCOPROT1 IGA SER-ACNC: <10 SAU
B2 GLYCOPROT1 IGG SER-ACNC: <10 SGU
B2 GLYCOPROT1 IGM SER-ACNC: <10 SMU
CARDIOLIPIN IGA SER IA-ACNC: <10 APL
CARDIOLIPIN IGG SER IA-ACNC: <10 GPL
CARDIOLIPIN IGM SER IA-ACNC: <10 MPL
CONFIRM APTT: 0.7 SEC
CONFIRM DRVVT: NORMAL SEC
LABORATORY COMMENT REPORT: NORMAL
NOTE: ABNORMAL
PROTHROM IGG SERPL-ACNC: 2 G UNITS
PS IGG SER IA-ACNC: 0 GPS
PS IGM SER IA-ACNC: 1 MPS
SCREEN DRVVT/NORMAL: NORMAL RATIO
SCREEN DRVVT: 38.1 SEC

## 2025-08-27 ENCOUNTER — OFFICE VISIT (OUTPATIENT)
Age: 30
End: 2025-08-27
Payer: COMMERCIAL

## 2025-08-27 ENCOUNTER — PATIENT MESSAGE (OUTPATIENT)
Age: 30
End: 2025-08-27

## 2025-08-27 VITALS
TEMPERATURE: 98.8 F | SYSTOLIC BLOOD PRESSURE: 100 MMHG | HEART RATE: 81 BPM | BODY MASS INDEX: 25.56 KG/M2 | WEIGHT: 163.2 LBS | OXYGEN SATURATION: 98 % | DIASTOLIC BLOOD PRESSURE: 68 MMHG | RESPIRATION RATE: 16 BRPM

## 2025-08-27 DIAGNOSIS — R76.8 POSITIVE ANA (ANTINUCLEAR ANTIBODY): ICD-10-CM

## 2025-08-27 DIAGNOSIS — R23.3 EASY BRUISING: Primary | ICD-10-CM

## 2025-08-27 DIAGNOSIS — R76.8 ANA POSITIVE: Primary | ICD-10-CM

## 2025-08-27 DIAGNOSIS — E55.9 HYPOVITAMINOSIS D: ICD-10-CM

## 2025-08-27 PROCEDURE — 99213 OFFICE O/P EST LOW 20 MIN: CPT | Performed by: NURSE PRACTITIONER

## 2025-08-27 ASSESSMENT — ENCOUNTER SYMPTOMS
BLOOD IN STOOL: 0
EYE PAIN: 0
COUGH: 0
SORE THROAT: 0
NAUSEA: 0
ABDOMINAL PAIN: 0
SHORTNESS OF BREATH: 0
COLOR CHANGE: 0
VOMITING: 0
DIARRHEA: 0
EYE REDNESS: 0
TROUBLE SWALLOWING: 0
CONSTIPATION: 1

## 2025-08-27 ASSESSMENT — PATIENT HEALTH QUESTIONNAIRE - PHQ9
2. FEELING DOWN, DEPRESSED OR HOPELESS: NOT AT ALL
SUM OF ALL RESPONSES TO PHQ QUESTIONS 1-9: 0
1. LITTLE INTEREST OR PLEASURE IN DOING THINGS: NOT AT ALL
SUM OF ALL RESPONSES TO PHQ QUESTIONS 1-9: 0

## 2025-09-02 ENCOUNTER — TELEPHONE (OUTPATIENT)
Age: 30
End: 2025-09-02

## 2025-09-03 ENCOUNTER — TELEPHONE (OUTPATIENT)
Age: 30
End: 2025-09-03

## 2025-09-05 ENCOUNTER — TELEPHONE (OUTPATIENT)
Age: 30
End: 2025-09-05

## (undated) DEVICE — Device

## (undated) DEVICE — NDL SPNE QNCKE 22GX3.5IN LF --

## (undated) DEVICE — INFECTION CONTROL KIT SYS

## (undated) DEVICE — SYRINGE,EAR/ULCER, 2 OZ, STERILE: Brand: MEDLINE

## (undated) DEVICE — SUTURE MCRYL SZ 4-0 L27IN ABSRB UD L19MM PS-2 1/2 CIR PRIM Y426H

## (undated) DEVICE — GOWN,AURORA,NON-REINFORCED,2XL: Brand: MEDLINE

## (undated) DEVICE — SUTURE ABSORBABLE MONOFILAMENT 2-0 8 IN ANTIBACT STRATAFIX SXMP1B409

## (undated) DEVICE — INSUFFLATION NEEDLE: Brand: SURGINEEDLE

## (undated) DEVICE — SOLUTION IV 1000ML 0.9% SOD CHL

## (undated) DEVICE — NEEDLE,22GX1",REG,BEVEL: Brand: MEDLINE

## (undated) DEVICE — DRAPE,HAND,STERILE: Brand: MEDLINE

## (undated) DEVICE — SPONGE GZ W4XL4IN COT 12 PLY TYP VII WVN C FLD DSGN

## (undated) DEVICE — PACK,BASIC,SIRUS,V: Brand: MEDLINE

## (undated) DEVICE — GLOVE SURG SZ 75 L12IN FNGR THK79MIL GRN LTX FREE

## (undated) DEVICE — WRAP COHESIVE W2INXL5YD TAN SELF ADH BNDG HND NON STERILE TEAR CARING

## (undated) DEVICE — SOLUTION SCRB 4OZ 4% CHG H2O AIDED FOR PREOPERATIVE SKIN

## (undated) DEVICE — OBTRTR BLDELSS OPT 8MM DISP -- DA VINICI XI - SNGL USE

## (undated) DEVICE — GARMENT,MEDLINE,DVT,INT,CALF,MED, GEN2: Brand: MEDLINE

## (undated) DEVICE — BLADE OPHTH MINI BEAV SHRP --

## (undated) DEVICE — DISPOSABLE TOURNIQUET CUFF SINGLE BLADDER, DUAL PORT AND QUICK CONNECT CONNECTOR: Brand: COLOR CUFF

## (undated) DEVICE — SOLUTION IRRIG 1000ML 0.9% SOD CHL USP POUR PLAS BTL

## (undated) DEVICE — GLOVE SURG 7.5 11.7IN BEAD CUF LIGHT BRN SENSICARE LTX FREE

## (undated) DEVICE — GLOVE ORANGE PI 7 1/2   MSG9075

## (undated) DEVICE — SYRINGE MED 10ML LUERLOCK TIP W/O SFTY DISP

## (undated) DEVICE — BLADE ES L4IN INSUL EDGE

## (undated) DEVICE — DRAPE,REIN 53X77,STERILE: Brand: MEDLINE

## (undated) DEVICE — TAPE,CLOTH/SILK,CURAD,3"X10YD,LF,40/CS: Brand: CURAD

## (undated) DEVICE — SUTURE SOFSILK 2-0 30IN BLK GS833] MEDTRONIC COVIDIEN US SURGICAL] GS833

## (undated) DEVICE — AIRSEAL 8 MM ACCESS PORT AND LOW PROFILE OBTURATOR WITH BLADELESS OPTICAL TIP, 120 MM LENGTH: Brand: AIRSEAL

## (undated) DEVICE — SUT MCRYL 4-0 27IN PS2 UD --

## (undated) DEVICE — PLASTICS CHEST BREAST ASU: Brand: MEDLINE INDUSTRIES, INC.

## (undated) DEVICE — Device: Brand: JELCO

## (undated) DEVICE — BANDAGE,GAUZE,BULKEE II,2.25"X3YD,STRL: Brand: MEDLINE

## (undated) DEVICE — NEEDLE HYPO 25GA L1.5IN BVL ORIENTED ECLIPSE

## (undated) DEVICE — BAG SPEC REM 224ML W4XL6IN DIA10MM 1 HND GYN DISP ENDOPCH

## (undated) DEVICE — SYSTEM EVAC SMOKE LAPARSCOPIC

## (undated) DEVICE — GLOVE SURG SZ 7 L12IN FNGR THK79MIL GRN LTX FREE

## (undated) DEVICE — SUT ETHLN 4-0 18IN PS2 BLK --

## (undated) DEVICE — SEAL UNIV 5-8MM DISP BX/10 -- DA VINCI XI - SNGL USE

## (undated) DEVICE — KIT,ANTI FOG,W/SPONGE & FLUID,SOFT PACK: Brand: MEDLINE

## (undated) DEVICE — TRAY PREP DRY W/ PREM GLV 2 APPL 6 SPNG 2 UNDPD 1 OVERWRAP

## (undated) DEVICE — DRESSING,GAUZE,XEROFORM,CURAD,1"X8",ST: Brand: CURAD

## (undated) DEVICE — TRI-LUMEN FILTERED TUBE SET WITH ACTIVATED CHARCOAL FILTER: Brand: AIRSEAL

## (undated) DEVICE — BANDAGE,ELASTIC,ESMARK,STERILE,4"X9',LF: Brand: MEDLINE

## (undated) DEVICE — PENCIL SMK EVAC ALL IN 1 DSGN ENH VISIBILITY IMPROVED AIR

## (undated) DEVICE — COVER US PRB W5XL96IN LTX W/ GEL

## (undated) DEVICE — ELECTRO LUBE IS A SINGLE PATIENT USE DEVICE THAT IS INTENDED TO BE USED ON ELECTROSURGICAL ELECTRODES TO REDUCE STICKING.: Brand: KEY SURGICAL ELECTRO LUBE

## (undated) DEVICE — LIQUIBAND RAPID ADHESIVE 36/CS 0.8ML: Brand: MEDLINE

## (undated) DEVICE — SYR 10ML LUER LOK 1/5ML GRAD --

## (undated) DEVICE — GLOVE SURG SZ 8 L12IN FNGR THK94MIL STD WHT LTX FREE

## (undated) DEVICE — STERILE POLYISOPRENE POWDER-FREE SURGICAL GLOVES WITH EMOLLIENT COATING: Brand: PROTEXIS

## (undated) DEVICE — BNDG ELAS HK LOOP 3X5YD NS -- MATRIX

## (undated) DEVICE — COVER MPLR TIP CRV SCIS ACC DA VINCI

## (undated) DEVICE — SUTURE VCRL SZ 3-0 L27IN ABSRB UD L26MM SH 1/2 CIR J416H

## (undated) DEVICE — ARM DRAPE

## (undated) DEVICE — BIPOLAR FORCEPS CORD: Brand: VALLEYLAB

## (undated) DEVICE — STRAP,POSITIONING,KNEE/BODY,FOAM,4X60": Brand: MEDLINE

## (undated) DEVICE — GYN LAPAROSCOPY - SMH: Brand: MEDLINE INDUSTRIES, INC.

## (undated) DEVICE — PAD PT POS 36 IN SURGYPAD DISP